# Patient Record
Sex: MALE | Race: WHITE | Employment: OTHER | URBAN - METROPOLITAN AREA
[De-identification: names, ages, dates, MRNs, and addresses within clinical notes are randomized per-mention and may not be internally consistent; named-entity substitution may affect disease eponyms.]

---

## 2021-02-10 ENCOUNTER — OFFICE VISIT (OUTPATIENT)
Dept: PRIMARY CARE CLINIC | Age: 86
End: 2021-02-10
Payer: MEDICARE

## 2021-02-10 DIAGNOSIS — Z01.812 PRE-OPERATIVE LABORATORY EXAMINATION: Primary | ICD-10-CM

## 2021-02-10 PROCEDURE — G8428 CUR MEDS NOT DOCUMENT: HCPCS | Performed by: NURSE PRACTITIONER

## 2021-02-10 PROCEDURE — G8421 BMI NOT CALCULATED: HCPCS | Performed by: NURSE PRACTITIONER

## 2021-02-10 PROCEDURE — 99211 OFF/OP EST MAY X REQ PHY/QHP: CPT | Performed by: NURSE PRACTITIONER

## 2021-02-10 RX ORDER — LISINOPRIL 10 MG/1
10 TABLET ORAL DAILY
COMMUNITY
Start: 2020-06-05

## 2021-02-10 RX ORDER — INDOMETHACIN 50 MG/1
50 CAPSULE ORAL PRN
COMMUNITY
End: 2022-02-01

## 2021-02-10 RX ORDER — ESOMEPRAZOLE MAGNESIUM 40 MG/1
40 CAPSULE, DELAYED RELEASE ORAL
COMMUNITY
Start: 2021-02-06 | End: 2022-02-09

## 2021-02-10 RX ORDER — ATORVASTATIN CALCIUM 10 MG/1
10 TABLET, FILM COATED ORAL DAILY
COMMUNITY
End: 2022-02-01

## 2021-02-10 RX ORDER — TAMSULOSIN HYDROCHLORIDE 0.4 MG/1
0.4 CAPSULE ORAL DAILY
COMMUNITY
End: 2021-03-11

## 2021-02-10 RX ORDER — ALFUZOSIN HYDROCHLORIDE 10 MG/1
10 TABLET, EXTENDED RELEASE ORAL DAILY
COMMUNITY
Start: 2020-12-28 | End: 2022-02-09

## 2021-02-10 NOTE — PROGRESS NOTES
The patient was counseled at length about the risks of nellie Covid-19 during their perioperative period and any recovery window from their procedure. The patient was made aware that nellie Covid-19  may worsen their prognosis for recovering from their procedure  and lend to a higher morbidity and/or mortality risk. All material risks, benefits, and reasonable alternatives including postponing the procedure were discussed. The patient does wish to proceed with the procedure at this time.

## 2021-02-10 NOTE — PROGRESS NOTES
C-Difficile admission screening and protocol:     * Admitted with diarrhea? n     *Prior history of C-Diff. In last 3 months?n     *Antibiotic use in the past 6-8 weeks?n     *Prior hospitalization or nursing home in the last month?n        [address] time___730____        Surgery time____________    Take the following medications with a sip of water:    Do not eat or drink anything after 12:00 midnight prior to your surgery. This includes water chewing gum, mints and ice chips. You may brush your teeth and gargle the morning of your surgery, but do not swallow the water     Please see your family doctor/pediatrician for a history and physical and/or concerning medications. Bring any test results/reports from your physicians office. If you are under the care of a heart doctor or specialist doctor, please be aware that you may be asked to them for clearance    You may be asked to stop blood thinners such as Coumadin, Plavix, Fragmin, Lovenox, etc., or any anti-inflammatories such as:  Aspirin, Ibuprofen, Advil, Naproxen prior to your surgery. We also ask that you stop any OTC medications such as fish oil, vitamin E, glucosamine, garlic, Multivitamins, COQ 10, etc.    We ask that you do not smoke 24 hours prior to surgery  We ask that you do not  drink any alcoholic beverages 24 hours prior to surgery     You must make arrangements for a responsible adult to take you home after your surgery. For your safety you will not be allowed to leave alone or drive yourself home. Your surgery will be cancelled if you do not have a ride home. Also for your safety, it is strongly suggested that someone stay with you the first 24 hours after your surgery. A parent or legal guardian must accompany a child scheduled for surgery and plan to stay at the hospital until the child is discharged. Please do not bring other children with you.     For your comfort, please wear simple loose fitting clothing to the hospital.  Please do not bring valuables. Do not wear any make-up or nail polish on your fingers or toes      For your safety, please do not wear any jewelry or body piercing's on the day of surgery. All jewelry must be removed. If you have dentures, they will be removed before going to operating room. For your convenience, we will provide you with a container. If you wear contact lenses or glasses, they will be removed, please bring a case for them. If you have a living will and a durable power of  for healthcare, please bring in a copy. As part of our patient safety program to minimize surgical site infections, we ask you to do the following:    · Please notify your surgeon if you develop any illness between         now and the  day of your surgery. · This includes a cough, cold, fever, sore throat, nausea,         or vomiting, and diarrhea, etc.  ·  Please notify your surgeon if you experience dizziness, shortness         of breath or blurred vision between now and the time of your surgery. Do not shave your operative site 96 hours prior to surgery. For face and neck surgery, men may use an electric razor 48 hours   prior to surgery. You may shower the night before surgery or the morning of   your surgery with an antibacterial soap. You will need to bring a photo ID and insurance card    [facility] has an onsite pharmacy, would you like to utilize our pharmacy     If you will be staying overnight and use a C-pap machine, please bring   your C-pap to hospital     Our goal is to provide you with excellent care, therefore, visitors will be limited to two(2) in the room at a time so that we may focus on providing this care for you. Please contact pre-admission testing if you have any further questions.                  [facility] phone number:  [phone] PAT fax number:  [phone]  Please note these are generalized instructions for all surgical cases, you may be provided with more specific instructions according to your surgery.

## 2021-02-11 LAB — SARS-COV-2, PCR: NOT DETECTED

## 2021-02-15 ENCOUNTER — ANESTHESIA EVENT (OUTPATIENT)
Dept: ENDOSCOPY | Age: 86
End: 2021-02-15
Payer: MEDICARE

## 2021-02-16 ENCOUNTER — ANESTHESIA (OUTPATIENT)
Dept: ENDOSCOPY | Age: 86
End: 2021-02-16
Payer: MEDICARE

## 2021-03-11 RX ORDER — LANOLIN ALCOHOL/MO/W.PET/CERES
325 CREAM (GRAM) TOPICAL
COMMUNITY
End: 2022-02-18

## 2021-03-11 RX ORDER — ACETAMINOPHEN 325 MG/1
650 TABLET ORAL EVERY 6 HOURS PRN
Status: ON HOLD | COMMUNITY
End: 2022-02-11 | Stop reason: HOSPADM

## 2021-03-11 NOTE — PROGRESS NOTES
C-Difficile admission screening and protocol:     * Admitted with diarrhea?no     *Prior history of C-Diff. In last 3 months?no     *Antibiotic use in the past 6-8 weeks?no     *Prior hospitalization or nursing home in the last month?  no    Preoperative Screening for Elective Surgery/Invasive Procedures While COVID-19 present in the community     Have you tested positive or have been told to self-isolate for COVID-19 like symptoms within the past 28 days? no   Do you currently have any of the following symptoms?no  o Fever >100.0 F or 99.9 F in immunocompromised patients? o New onset cough, shortness of breath or difficulty breathing?  o New onset sore throat, myalgia (muscle aches and pains), headache, loss of taste/smell or diarrhea?  Have you had a potential exposure to COVID-19 within the past 14 days by:no  o Close contact with a confirmed case? o Close contact with a healthcare worker,  or essential infrastructure worker (grocery store, [employer], gas station, public utilities or transportation)? o Do you reside in a congregate setting such as; skilled nursing facility, adult home, correctional facility, homeless shelter or other institutional setting?  o Have you had recent travel to a known COVID-19 hotspot? Indicate if the patient has a positive screen by answering yes to one or more of the above questions. Patients who test positive or screen positive prior to surgery or on the day of surgery should be evaluated in conjunction with the surgeon/proceduralist/anesthesiologist to determine the urgency of the procedure. Remember. Shy Roth Safety First! Call before you Fall Remember      [address]  time___0930_________        Surgery time__1100__________    Take the following medications with a sip of water: Follow your MD/Surgeons pre-procedure instructions regarding your medications    Do not eat or drink anything after 12:00 midnight except for your prep prior to your surgery. This includes water chewing gum, mints and ice chips. You may brush your teeth and gargle the morning of your surgery, but do not swallow the water     Please see your family doctor/pediatrician for a history and physical and/or concerning medications. Bring any test results/reports from your physicians office. If you are under the care of a heart doctor or specialist doctor, please be aware that you may be asked to them for clearance    You may be asked to stop blood thinners such as Coumadin, Plavix, Fragmin, Lovenox, etc., or any anti-inflammatories such as:  Aspirin, Ibuprofen, Advil, Naproxen prior to your surgery. We also ask that you stop any OTC medications such as fish oil, vitamin E, glucosamine, garlic, Multivitamins, COQ 10, etc.    We ask that you do not smoke 24 hours prior to surgery  We ask that you do not  drink any alcoholic beverages 24 hours prior to surgery     You must make arrangements for a responsible adult to take you home after your surgery. For your safety you will not be allowed to leave alone or drive yourself home. Your surgery will be cancelled if you do not have a ride home. Also for your safety, it is strongly suggested that someone stay with you the first 24 hours after your surgery. A parent or legal guardian must accompany a child scheduled for surgery and plan to stay at the hospital until the child is discharged. Please do not bring other children with you. For your comfort, please wear simple loose fitting clothing to the hospital.  Please do not bring valuables. Do not wear any make-up or nail polish on your fingers or toes      For your safety, please do not wear any jewelry or body piercing's on the day of surgery. All jewelry must be removed. If you have dentures, they will be removed before going to operating room. For your convenience, we will provide you with a container.     If you wear contact lenses or glasses, they will be removed, please bring a case for them. If you have a living will and a durable power of  for healthcare, please bring in a copy. As part of our patient safety program to minimize surgical site infections, we ask you to do the following:    · Please notify your surgeon if you develop any illness between         now and the  day of your surgery. · This includes a cough, cold, fever, sore throat, nausea,         or vomiting, and diarrhea, etc.  ·  Please notify your surgeon if you experience dizziness, shortness         of breath or blurred vision between now and the time of your surgery. Do not shave your operative site 96 hours prior to surgery. For face and neck surgery, men may use an electric razor 48 hours   prior to surgery. You may shower the night before surgery or the morning of   your surgery with an antibacterial soap. You will need to bring a photo ID and insurance card    [facility] has an onsite pharmacy, would you like to utilize our pharmacy     If you will be staying overnight and use a C-pap machine, please bring   your C-pap to hospital     Our goal is to provide you with excellent care, therefore, visitors will be limited to two(2) in the room at a time so that we may focus on providing this care for you. Please contact pre-admission testing if you have any further questions. [facility] phone number:  [phone] PAT fax number:  [phone]  Please note these are generalized instructions for all surgical cases, you may be provided with more specific instructions according to your surgery.

## 2021-03-16 ENCOUNTER — HOSPITAL ENCOUNTER (OUTPATIENT)
Age: 86
Setting detail: OUTPATIENT SURGERY
Discharge: HOME | End: 2021-03-16
Attending: INTERNAL MEDICINE | Admitting: INTERNAL MEDICINE
Payer: MEDICARE

## 2021-03-16 VITALS
TEMPERATURE: 98 F | RESPIRATION RATE: 18 BRPM | DIASTOLIC BLOOD PRESSURE: 80 MMHG | WEIGHT: 215 LBS | HEART RATE: 63 BPM | BODY MASS INDEX: 26.18 KG/M2 | SYSTOLIC BLOOD PRESSURE: 154 MMHG | OXYGEN SATURATION: 99 % | HEIGHT: 76 IN

## 2021-03-16 VITALS — SYSTOLIC BLOOD PRESSURE: 113 MMHG | DIASTOLIC BLOOD PRESSURE: 59 MMHG | OXYGEN SATURATION: 91 %

## 2021-03-16 LAB — SARS-COV-2, NAAT: NOT DETECTED

## 2021-03-16 PROCEDURE — 2580000003 HC RX 258: Performed by: ANESTHESIOLOGY

## 2021-03-16 PROCEDURE — 7100000010 HC PHASE II RECOVERY - FIRST 15 MIN: Performed by: INTERNAL MEDICINE

## 2021-03-16 PROCEDURE — 3700000000 HC ANESTHESIA ATTENDED CARE: Performed by: INTERNAL MEDICINE

## 2021-03-16 PROCEDURE — 7100000011 HC PHASE II RECOVERY - ADDTL 15 MIN: Performed by: INTERNAL MEDICINE

## 2021-03-16 PROCEDURE — 2709999900 HC NON-CHARGEABLE SUPPLY: Performed by: INTERNAL MEDICINE

## 2021-03-16 PROCEDURE — 7100000000 HC PACU RECOVERY - FIRST 15 MIN: Performed by: INTERNAL MEDICINE

## 2021-03-16 PROCEDURE — 3609027000 HC COLONOSCOPY: Performed by: INTERNAL MEDICINE

## 2021-03-16 PROCEDURE — 7100000001 HC PACU RECOVERY - ADDTL 15 MIN: Performed by: INTERNAL MEDICINE

## 2021-03-16 PROCEDURE — 87635 SARS-COV-2 COVID-19 AMP PRB: CPT

## 2021-03-16 PROCEDURE — 3700000001 HC ADD 15 MINUTES (ANESTHESIA): Performed by: INTERNAL MEDICINE

## 2021-03-16 PROCEDURE — 6360000002 HC RX W HCPCS: Performed by: NURSE ANESTHETIST, CERTIFIED REGISTERED

## 2021-03-16 PROCEDURE — 2500000003 HC RX 250 WO HCPCS: Performed by: NURSE ANESTHETIST, CERTIFIED REGISTERED

## 2021-03-16 RX ORDER — LIDOCAINE HYDROCHLORIDE 20 MG/ML
INJECTION, SOLUTION EPIDURAL; INFILTRATION; INTRACAUDAL; PERINEURAL PRN
Status: DISCONTINUED | OUTPATIENT
Start: 2021-03-16 | End: 2021-03-16 | Stop reason: SDUPTHER

## 2021-03-16 RX ORDER — SODIUM CHLORIDE 9 MG/ML
INJECTION, SOLUTION INTRAVENOUS CONTINUOUS
Status: DISCONTINUED | OUTPATIENT
Start: 2021-03-16 | End: 2021-03-16 | Stop reason: HOSPADM

## 2021-03-16 RX ORDER — PROPOFOL 10 MG/ML
INJECTION, EMULSION INTRAVENOUS CONTINUOUS PRN
Status: DISCONTINUED | OUTPATIENT
Start: 2021-03-16 | End: 2021-03-16 | Stop reason: SDUPTHER

## 2021-03-16 RX ORDER — SODIUM CHLORIDE 0.9 % (FLUSH) 0.9 %
10 SYRINGE (ML) INJECTION EVERY 12 HOURS SCHEDULED
Status: DISCONTINUED | OUTPATIENT
Start: 2021-03-16 | End: 2021-03-16 | Stop reason: HOSPADM

## 2021-03-16 RX ORDER — PROPOFOL 10 MG/ML
INJECTION, EMULSION INTRAVENOUS PRN
Status: DISCONTINUED | OUTPATIENT
Start: 2021-03-16 | End: 2021-03-16 | Stop reason: SDUPTHER

## 2021-03-16 RX ORDER — SODIUM CHLORIDE 0.9 % (FLUSH) 0.9 %
10 SYRINGE (ML) INJECTION PRN
Status: DISCONTINUED | OUTPATIENT
Start: 2021-03-16 | End: 2021-03-16 | Stop reason: HOSPADM

## 2021-03-16 RX ADMIN — LIDOCAINE HYDROCHLORIDE 60 MG: 20 INJECTION, SOLUTION EPIDURAL; INFILTRATION; INTRACAUDAL; PERINEURAL at 11:02

## 2021-03-16 RX ADMIN — PROPOFOL 200 MCG/KG/MIN: 10 INJECTION, EMULSION INTRAVENOUS at 11:02

## 2021-03-16 RX ADMIN — PROPOFOL 100 MG: 10 INJECTION, EMULSION INTRAVENOUS at 11:02

## 2021-03-16 RX ADMIN — SODIUM CHLORIDE: 9 INJECTION, SOLUTION INTRAVENOUS at 10:12

## 2021-03-16 ASSESSMENT — PULMONARY FUNCTION TESTS
PIF_VALUE: 1

## 2021-03-16 ASSESSMENT — LIFESTYLE VARIABLES: SMOKING_STATUS: 0

## 2021-03-16 NOTE — PROGRESS NOTES
Patient awake and alert. Resp easy unlabored on room air O2 with SaO2 98%. Abdomen rounded soft. VSS. IV patent. Moving all extremities to command. Patient stable to transfer to ACU for phase II.

## 2021-03-16 NOTE — OP NOTE
Colonoscopy Procedure Note      Patient: Watson Pickens  : 1935  Acct#:     Procedure: Colonoscopy with terminal ileal intubation    Date:  3/16/2021    Surgeon:  Amos Baig MD    Referring Physician:  Amos Baig MD    Previous Colonoscopy: YES    Preoperative Diagnosis:  81 y.o. male  who presents for colonoscopy due to colon cancer screening. Postoperative Diagnosis:    1. Scattered left-sided diverticulosis  2. Fair-to-poor preparation, with extensive lavage of approximately 1 L of liquid stool to achieve adequate mucosal views to identify polyps >6 mm in size. Consent:  The patient or their legal guardian has signed a consent, and is aware of the potential risks, benefits, alternatives, and potential complications of this procedure. These include, but are not limited to hemorrhage, bleeding, post procedural pain, perforation, phlebitis, aspiration, hypotension, hypoxia, cardiovascular events such as arryhthmia, and possibly death. Additionally, the possibility of missed colonic polyps and interval colon cancer was discussed in the consent. Anesthesia:  MAC    Procedure: An informed consent was obtained from the patient after explanation of indications, benefits, possible risks and complications of the procedure. The patient was then taken to the endoscopy suite, placed in the left lateral decubitus position, and the above IV anesthesia was administered. A digital rectal examination was performed and revealed negative without mass, lesions or tenderness. The Olympus video colonoscope was placed in the patient's rectum under digital direction and advanced to the cecum. The cecum was identified by characteristic anatomy and ballottment. The ileocecal valve was identified. Fair-to-poor preparation, with extensive lavage of approximately 1 L of liquid stool to achieve adequate mucosal views to identify polyps >6 mm in size.     The terminal ileum was normal.    The scope was then withdrawn back through the cecum, ascending, transverse, descending, sigmoid colon, and rectum. Careful circumferential examination of the mucosa in these areas demonstrated scattered left-sided diverticulosis otherwise, the examined colonic mucosa was unremarkable. The scope was then withdrawn into the rectum and retroflexed. The retroflexed view of the anal verge and rectum demonstrates small internal hemorrhoids. The scope was straightened, the colon was decompressed and the scope was withdrawn from the patient. The patient tolerated the procedure well and was taken to the PACU in good condition. Estimated blood loss: None    Impression:  See post-procedure diagnoses. Recommendations:   - Follow-up pathology results in 7 days, by calling the office at [phone].  - Resume regular medications. - Resume diet as tolerated. - No repeat colonoscopy recommended given patient's age.     IDRIS [address]  3/16/2021  [phone]

## 2021-03-16 NOTE — H&P
Pre-operative History and Physical    Patient: Kali Coelho  : 1935  Acct#:     Intended Procedure:  colonoscopy     HISTORY OF PRESENT ILLNESS:  The patient is a 81 y.o. male  who presents for colonoscopy due to colon cancer screening. Past Medical History:        Diagnosis Date    Arthritis     Cancer ([address]     skin-leg    Enlarged prostate     GERD (gastroesophageal reflux disease)     Gout     Hyperlipidemia     Hypertension     Neuropathy     Wears glasses     Wears partial dentures     Wears partial dentures     upper     Past Surgical History:        Procedure Laterality Date    BACK SURGERY      COLONOSCOPY      FRACTURE SURGERY Left     ankle    TONSILLECTOMY       Medications Prior to Admission:   No current facility-administered medications on file prior to encounter. Current Outpatient Medications on File Prior to Encounter   Medication Sig Dispense Refill    acetaminophen (TYLENOL) 325 MG tablet Take 650 mg by mouth every 6 hours as needed for Pain      ferrous sulfate (FE TABS 325) 325 (65 Fe) MG EC tablet Take 325 mg by mouth 3 times daily (with meals)      lisinopril (PRINIVIL;ZESTRIL) 10 MG tablet Take 10 mg by mouth as needed       alfuzosin (UROXATRAL) 10 MG extended release tablet TAKE 1 TABLET BY MOUTH EVERY DAY      indomethacin (INDOCIN) 50 MG capsule Take 50 mg by mouth as needed for Pain      esomeprazole (NEXIUM) 40 MG delayed release capsule as needed       atorvastatin (LIPITOR) 10 MG tablet Take 10 mg by mouth daily          Allergies:  Patient has no known allergies. Social History:   TOBACCO:   reports that he has never smoked. He has never used smokeless tobacco.  ETOH:   reports previous alcohol use. DRUGS:   reports no history of drug use.     PHYSICAL EXAM:      Vital Signs: /85   Pulse 82   Temp 97.2 °F (36.2 °C) (Temporal)   Resp 17   Ht 6' 4\" (1.93 m)   Wt 215 lb (97.5 kg)   SpO2 98%   BMI 26.17 kg/m²    Airway: No stridor or wheezing noted. Good air movement  Pulmonary: without wheezes. Clear to auscultation  Cardiac:regular rate and rhythm without loud murmurs  Abdomen:soft, nontender,  Bowel sounds present    Pre-Procedure Assessment / Plan:  1) Colonoscopy    ASA Grade:  ASA 2 - Patient with mild systemic disease with no functional limitations  Mallampati Classification:  Class II    Level of Sedation Plan:MAC    Post Procedure plan: Return to same level of care    I assessed the patient and find that the patient is in satisfactory condition to proceed with the planned procedure and sedation plan. I have explained the risk, benefits, and alternatives to the procedure; the patient understands and agrees to proceed.        Charlene Banuelos MD  3/16/2021

## 2021-03-16 NOTE — PROGRESS NOTES
Patienet admitted to PACU form OR. Patient asleep. Resp easy unlabored on 3LNC with SAO2 90%. Abdomen rounded soft. VSS. IV patent to right hand.

## 2021-03-16 NOTE — ANESTHESIA PRE PROCEDURE
[facility] Department of Anesthesiology  Pre-Anesthesia Evaluation/Consultation       Name:  Watson Pickens  : 1935  Age:  81 y. o. MRN:  [id number]  Date: 3/16/2021           Surgeon: Surgeon(s):  Rg Ceja MD    Procedure: Procedure(s):  COLONOSCOPY     No Known Allergies  There is no problem list on file for this patient. Past Medical History:   Diagnosis Date    Arthritis     Cancer ([address]     skin-leg    Enlarged prostate     GERD (gastroesophageal reflux disease)     Gout     Hyperlipidemia     Hypertension     Neuropathy     Wears glasses     Wears partial dentures     Wears partial dentures     upper     Past Surgical History:   Procedure Laterality Date    BACK SURGERY      COLONOSCOPY      FRACTURE SURGERY Left     ankle    TONSILLECTOMY       Social History     Tobacco Use    Smoking status: Never Smoker    Smokeless tobacco: Never Used   Substance Use Topics    Alcohol use: Not Currently    Drug use: Never     Medications  No current facility-administered medications on file prior to encounter.       Current Outpatient Medications on File Prior to Encounter   Medication Sig Dispense Refill    acetaminophen (TYLENOL) 325 MG tablet Take 650 mg by mouth every 6 hours as needed for Pain      ferrous sulfate (FE TABS 325) 325 (65 Fe) MG EC tablet Take 325 mg by mouth 3 times daily (with meals)      lisinopril (PRINIVIL;ZESTRIL) 10 MG tablet Take 10 mg by mouth as needed       alfuzosin (UROXATRAL) 10 MG extended release tablet TAKE 1 TABLET BY MOUTH EVERY DAY      indomethacin (INDOCIN) 50 MG capsule Take 50 mg by mouth as needed for Pain      esomeprazole (NEXIUM) 40 MG delayed release capsule as needed       atorvastatin (LIPITOR) 10 MG tablet Take 10 mg by mouth daily       Current Facility-Administered Medications   Medication Dose Route Frequency Provider Last Rate Last Admin    0.9 % sodium chloride infusion   Intravenous Continuous Vero Mott  mL/hr at 21 1012 New Bag at 21 1012    sodium chloride flush 0.9 % injection 10 mL  10 mL Intravenous 2 times per day Vero Mott MD        sodium chloride flush 0.9 % injection 10 mL  10 mL Intravenous PRN Vero Mott MD         Vital Signs (Current)   Vitals:    02/10/21 1523 21 1130 21   BP:   111/85   Pulse:   82   Resp:   17   Temp:   97.2 °F (36.2 °C)   TempSrc:   Temporal   SpO2:   98%   Weight: 220 lb (99.8 kg) 215 lb (97.5 kg) 215 lb (97.5 kg)   Height: 6' 4\" (1.93 m) 6' 4\" (1.93 m) 6' 4\" (1.93 m)                                          BP Readings from Last 3 Encounters:   21 111     Vital Signs Statistics (for past 48 hrs)     Temp  Av.2 °F (36.2 °C)  Min: 97.2 °F (36.2 °C)   Min taken time: 21  Max: 97.2 °F (36.2 °C)   Max taken time: 21  Pulse  Av  Min: 80   Min taken time: 21  Max: 80   Max taken time: 21  Resp  Av  Min: 16   Min taken time: 21  Max: 16   Max taken time: 21  BP  Min: 111/85   Min taken time: 21  Max: 111/85   Max taken time: 21  SpO2  Av %  Min: 98 %   Min taken time: 21  Max: 98 %   Max taken time: 21  BP Readings from Last 3 Encounters:   21 111/85       BMI  Body mass index is 26.17 kg/m². Estimated body mass index is 26.17 kg/m² as calculated from the following:    Height as of this encounter: 6' 4\" (1.93 m). Weight as of this encounter: 215 lb (97.5 kg). CBC No results found for: WBC, RBC, HGB, HCT, MCV, RDW, PLT  CMP  No results found for: NA, K, CL, CO2, BUN, CREATININE, GFRAA, AGRATIO, LABGLOM, GLUCOSE, PROT, CALCIUM, BILITOT, ALKPHOS, AST, ALT  BMP  No results found for: NA, K, CL, CO2, BUN, CREATININE, CALCIUM, GFRAA, LABGLOM, GLUCOSE  POCGlucose  No results for input(s): GLUCOSE in the last 72 hours.    Coags  No results found for: PROTIME, INR, APTT  HCG (If

## 2021-03-16 NOTE — ANESTHESIA POSTPROCEDURE EVALUATION
Department of Anesthesiology  Postprocedure Note    Patient: Nura Pineda  MRN: [id number]  YOB: 1935  Date of evaluation: 3/16/2021  Time:  12:29 PM     Procedure Summary     Date: 03/16/21 Room / Location: [address] [place] [address]    Anesthesia Start: 1059 Anesthesia Stop: 0869    Procedure: COLONOSCOPY (N/A ) Diagnosis:       Colon cancer screening      (COLON CANCER SCREENING)    Surgeons: Sandy Brambila MD Responsible Provider: Renaldo Bowser MD    Anesthesia Type: MAC ASA Status: 3          Anesthesia Type: MAC    Gale Phase I: Gale Score: 10    Gale Phase II:      Last vitals: Reviewed and per EMR flowsheets.        Anesthesia Post Evaluation    Patient location during evaluation: PACU  Patient participation: complete - patient participated  Level of consciousness: awake and alert  Pain score: 0  Airway patency: patent  Nausea & Vomiting: no nausea and no vomiting  Complications: no  Cardiovascular status: blood pressure returned to baseline  Respiratory status: acceptable  Hydration status: euvolemic

## 2021-07-26 ENCOUNTER — HOSPITAL ENCOUNTER (OUTPATIENT)
Dept: GENERAL RADIOLOGY | Age: 86
Discharge: HOME | End: 2021-07-26
Payer: MEDICARE

## 2021-07-26 ENCOUNTER — HOSPITAL ENCOUNTER (OUTPATIENT)
Age: 86
Discharge: HOME | End: 2021-07-26
Payer: MEDICARE

## 2021-07-26 DIAGNOSIS — M25.562 PAIN IN BOTH KNEES, UNSPECIFIED CHRONICITY: ICD-10-CM

## 2021-07-26 DIAGNOSIS — M25.561 PAIN IN BOTH KNEES, UNSPECIFIED CHRONICITY: ICD-10-CM

## 2021-07-26 PROCEDURE — 73560 X-RAY EXAM OF KNEE 1 OR 2: CPT

## 2022-02-01 ENCOUNTER — HOSPITAL ENCOUNTER (INPATIENT)
Age: 87
LOS: 6 days | Discharge: HOME | DRG: 309 | End: 2022-02-07
Attending: EMERGENCY MEDICINE | Admitting: INTERNAL MEDICINE
Payer: MEDICARE

## 2022-02-01 DIAGNOSIS — R00.0 TACHYCARDIA: Primary | ICD-10-CM

## 2022-02-01 LAB
A/G RATIO: 1.2 (ref 1.1–2.2)
ALBUMIN SERPL-MCNC: 4.1 G/DL (ref 3.4–5)
ALP BLD-CCNC: 122 U/L (ref 40–129)
ALT SERPL-CCNC: 9 U/L (ref 10–40)
ANION GAP SERPL CALCULATED.3IONS-SCNC: 14 MMOL/L (ref 3–16)
AST SERPL-CCNC: 14 U/L (ref 15–37)
BASOPHILS ABSOLUTE: 0 K/UL (ref 0–0.2)
BASOPHILS RELATIVE PERCENT: 1 %
BILIRUB SERPL-MCNC: <0.2 MG/DL (ref 0–1)
BUN BLDV-MCNC: 18 MG/DL (ref 7–20)
CALCIUM SERPL-MCNC: 9.2 MG/DL (ref 8.3–10.6)
CHLORIDE BLD-SCNC: 104 MMOL/L (ref 99–110)
CO2: 20 MMOL/L (ref 21–32)
CREAT SERPL-MCNC: 1.2 MG/DL (ref 0.8–1.3)
D DIMER: <200 NG/ML DDU (ref 0–229)
EOSINOPHILS ABSOLUTE: 0.1 K/UL (ref 0–0.6)
EOSINOPHILS RELATIVE PERCENT: 1.9 %
GFR AFRICAN AMERICAN: >60
GFR NON-AFRICAN AMERICAN: 57
GLUCOSE BLD-MCNC: 97 MG/DL (ref 70–99)
HCT VFR BLD CALC: 38.3 % (ref 40.5–52.5)
HEMOGLOBIN: 12.3 G/DL (ref 13.5–17.5)
LYMPHOCYTES ABSOLUTE: 1.4 K/UL (ref 1–5.1)
LYMPHOCYTES RELATIVE PERCENT: 27.4 %
MCH RBC QN AUTO: 27 PG (ref 26–34)
MCHC RBC AUTO-ENTMCNC: 32.2 G/DL (ref 31–36)
MCV RBC AUTO: 83.9 FL (ref 80–100)
MONOCYTES ABSOLUTE: 0.5 K/UL (ref 0–1.3)
MONOCYTES RELATIVE PERCENT: 10.9 %
NEUTROPHILS ABSOLUTE: 2.9 K/UL (ref 1.7–7.7)
NEUTROPHILS RELATIVE PERCENT: 58.8 %
PDW BLD-RTO: 15.6 % (ref 12.4–15.4)
PLATELET # BLD: 290 K/UL (ref 135–450)
PMV BLD AUTO: 6.6 FL (ref 5–10.5)
POTASSIUM REFLEX MAGNESIUM: 4.7 MMOL/L (ref 3.5–5.1)
RBC # BLD: 4.56 M/UL (ref 4.2–5.9)
SODIUM BLD-SCNC: 138 MMOL/L (ref 136–145)
TOTAL PROTEIN: 7.6 G/DL (ref 6.4–8.2)
TROPONIN: <0.01 NG/ML
TSH REFLEX: 3.75 UIU/ML (ref 0.27–4.2)
WBC # BLD: 5 K/UL (ref 4–11)

## 2022-02-01 PROCEDURE — 93005 ELECTROCARDIOGRAM TRACING: CPT | Performed by: EMERGENCY MEDICINE

## 2022-02-01 PROCEDURE — 85379 FIBRIN DEGRADATION QUANT: CPT

## 2022-02-01 PROCEDURE — 6360000002 HC RX W HCPCS: Performed by: EMERGENCY MEDICINE

## 2022-02-01 PROCEDURE — 99283 EMERGENCY DEPT VISIT LOW MDM: CPT

## 2022-02-01 PROCEDURE — 80053 COMPREHEN METABOLIC PANEL: CPT

## 2022-02-01 PROCEDURE — 2060000000 HC ICU INTERMEDIATE R&B

## 2022-02-01 PROCEDURE — 84484 ASSAY OF TROPONIN QUANT: CPT

## 2022-02-01 PROCEDURE — 2500000003 HC RX 250 WO HCPCS: Performed by: INTERNAL MEDICINE

## 2022-02-01 PROCEDURE — 96361 HYDRATE IV INFUSION ADD-ON: CPT

## 2022-02-01 PROCEDURE — 2580000003 HC RX 258: Performed by: INTERNAL MEDICINE

## 2022-02-01 PROCEDURE — 96374 THER/PROPH/DIAG INJ IV PUSH: CPT

## 2022-02-01 PROCEDURE — 2580000003 HC RX 258: Performed by: NURSE PRACTITIONER

## 2022-02-01 PROCEDURE — 85025 COMPLETE CBC W/AUTO DIFF WBC: CPT

## 2022-02-01 PROCEDURE — 6360000002 HC RX W HCPCS: Performed by: INTERNAL MEDICINE

## 2022-02-01 PROCEDURE — 84443 ASSAY THYROID STIM HORMONE: CPT

## 2022-02-01 PROCEDURE — 2500000003 HC RX 250 WO HCPCS: Performed by: NURSE PRACTITIONER

## 2022-02-01 PROCEDURE — 93005 ELECTROCARDIOGRAM TRACING: CPT

## 2022-02-01 PROCEDURE — 36415 COLL VENOUS BLD VENIPUNCTURE: CPT

## 2022-02-01 PROCEDURE — 96375 TX/PRO/DX INJ NEW DRUG ADDON: CPT

## 2022-02-01 PROCEDURE — 2500000003 HC RX 250 WO HCPCS: Performed by: EMERGENCY MEDICINE

## 2022-02-01 PROCEDURE — 6360000002 HC RX W HCPCS

## 2022-02-01 PROCEDURE — 2580000003 HC RX 258: Performed by: EMERGENCY MEDICINE

## 2022-02-01 PROCEDURE — 99223 1ST HOSP IP/OBS HIGH 75: CPT | Performed by: INTERNAL MEDICINE

## 2022-02-01 RX ORDER — ACETAMINOPHEN 325 MG/1
650 TABLET ORAL EVERY 6 HOURS PRN
Status: DISCONTINUED | OUTPATIENT
Start: 2022-02-01 | End: 2022-02-07 | Stop reason: HOSPADM

## 2022-02-01 RX ORDER — MAGNESIUM SULFATE IN WATER 40 MG/ML
2000 INJECTION, SOLUTION INTRAVENOUS PRN
Status: DISCONTINUED | OUTPATIENT
Start: 2022-02-01 | End: 2022-02-07 | Stop reason: HOSPADM

## 2022-02-01 RX ORDER — 0.9 % SODIUM CHLORIDE 0.9 %
1000 INTRAVENOUS SOLUTION INTRAVENOUS ONCE
Status: COMPLETED | OUTPATIENT
Start: 2022-02-01 | End: 2022-02-01

## 2022-02-01 RX ORDER — FERROUS SULFATE TAB EC 324 MG (65 MG FE EQUIVALENT) 324 (65 FE) MG
324 TABLET DELAYED RESPONSE ORAL
Status: DISCONTINUED | OUTPATIENT
Start: 2022-02-02 | End: 2022-02-07 | Stop reason: HOSPADM

## 2022-02-01 RX ORDER — OMEPRAZOLE 20 MG/1
40 CAPSULE, DELAYED RELEASE ORAL DAILY
COMMUNITY
End: 2022-02-01

## 2022-02-01 RX ORDER — ACETAMINOPHEN 650 MG/1
650 SUPPOSITORY RECTAL EVERY 6 HOURS PRN
Status: DISCONTINUED | OUTPATIENT
Start: 2022-02-01 | End: 2022-02-07 | Stop reason: HOSPADM

## 2022-02-01 RX ORDER — ONDANSETRON 2 MG/ML
4 INJECTION INTRAMUSCULAR; INTRAVENOUS EVERY 6 HOURS PRN
Status: DISCONTINUED | OUTPATIENT
Start: 2022-02-01 | End: 2022-02-07 | Stop reason: HOSPADM

## 2022-02-01 RX ORDER — ADENOSINE 3 MG/ML
6 INJECTION, SOLUTION INTRAVENOUS ONCE
Status: COMPLETED | OUTPATIENT
Start: 2022-02-01 | End: 2022-02-01

## 2022-02-01 RX ORDER — ADENOSINE 3 MG/ML
INJECTION, SOLUTION INTRAVENOUS
Status: COMPLETED
Start: 2022-02-01 | End: 2022-02-01

## 2022-02-01 RX ORDER — POTASSIUM CHLORIDE 7.45 MG/ML
10 INJECTION INTRAVENOUS PRN
Status: DISCONTINUED | OUTPATIENT
Start: 2022-02-01 | End: 2022-02-07 | Stop reason: HOSPADM

## 2022-02-01 RX ORDER — PANTOPRAZOLE SODIUM 40 MG/1
40 TABLET, DELAYED RELEASE ORAL
Status: DISCONTINUED | OUTPATIENT
Start: 2022-02-02 | End: 2022-02-07 | Stop reason: HOSPADM

## 2022-02-01 RX ORDER — PROMETHAZINE HYDROCHLORIDE 25 MG/1
12.5 TABLET ORAL EVERY 6 HOURS PRN
Status: DISCONTINUED | OUTPATIENT
Start: 2022-02-01 | End: 2022-02-07 | Stop reason: HOSPADM

## 2022-02-01 RX ORDER — SODIUM CHLORIDE 9 MG/ML
25 INJECTION, SOLUTION INTRAVENOUS PRN
Status: DISCONTINUED | OUTPATIENT
Start: 2022-02-01 | End: 2022-02-07 | Stop reason: HOSPADM

## 2022-02-01 RX ORDER — SODIUM CHLORIDE 0.9 % (FLUSH) 0.9 %
10 SYRINGE (ML) INJECTION EVERY 12 HOURS SCHEDULED
Status: DISCONTINUED | OUTPATIENT
Start: 2022-02-01 | End: 2022-02-07 | Stop reason: HOSPADM

## 2022-02-01 RX ORDER — POTASSIUM CHLORIDE 20 MEQ/1
40 TABLET, EXTENDED RELEASE ORAL PRN
Status: DISCONTINUED | OUTPATIENT
Start: 2022-02-01 | End: 2022-02-07 | Stop reason: HOSPADM

## 2022-02-01 RX ORDER — DILTIAZEM HYDROCHLORIDE 5 MG/ML
0.25 INJECTION INTRAVENOUS ONCE
Status: COMPLETED | OUTPATIENT
Start: 2022-02-01 | End: 2022-02-01

## 2022-02-01 RX ORDER — LISINOPRIL 10 MG/1
10 TABLET ORAL DAILY
Status: DISCONTINUED | OUTPATIENT
Start: 2022-02-02 | End: 2022-02-07 | Stop reason: HOSPADM

## 2022-02-01 RX ORDER — SODIUM CHLORIDE 0.9 % (FLUSH) 0.9 %
10 SYRINGE (ML) INJECTION PRN
Status: DISCONTINUED | OUTPATIENT
Start: 2022-02-01 | End: 2022-02-07 | Stop reason: HOSPADM

## 2022-02-01 RX ORDER — ADENOSINE 3 MG/ML
12 INJECTION, SOLUTION INTRAVENOUS ONCE
Status: COMPLETED | OUTPATIENT
Start: 2022-02-01 | End: 2022-02-01

## 2022-02-01 RX ORDER — DILTIAZEM HYDROCHLORIDE 5 MG/ML
5 INJECTION INTRAVENOUS EVERY 8 HOURS PRN
Status: DISCONTINUED | OUTPATIENT
Start: 2022-02-01 | End: 2022-02-07 | Stop reason: HOSPADM

## 2022-02-01 RX ORDER — DILTIAZEM HYDROCHLORIDE 5 MG/ML
5 INJECTION INTRAVENOUS ONCE
Status: COMPLETED | OUTPATIENT
Start: 2022-02-01 | End: 2022-02-01

## 2022-02-01 RX ADMIN — SODIUM CHLORIDE 1000 ML: 9 INJECTION, SOLUTION INTRAVENOUS at 14:33

## 2022-02-01 RX ADMIN — ADENOSINE 6 MG: 3 INJECTION, SOLUTION INTRAVENOUS at 14:49

## 2022-02-01 RX ADMIN — DILTIAZEM HYDROCHLORIDE 5 MG: 5 INJECTION INTRAVENOUS at 18:43

## 2022-02-01 RX ADMIN — SODIUM CHLORIDE, PRESERVATIVE FREE 10 ML: 5 INJECTION INTRAVENOUS at 22:05

## 2022-02-01 RX ADMIN — DILTIAZEM HYDROCHLORIDE 5 MG/HR: 5 INJECTION INTRAVENOUS at 21:07

## 2022-02-01 RX ADMIN — ADENOSINE 12 MG: 3 INJECTION, SOLUTION INTRAVENOUS at 14:52

## 2022-02-01 RX ADMIN — DILTIAZEM HYDROCHLORIDE 5 MG: 5 INJECTION INTRAVENOUS at 20:56

## 2022-02-01 RX ADMIN — DILTIAZEM HYDROCHLORIDE 25 MG: 5 INJECTION INTRAVENOUS at 16:03

## 2022-02-01 RX ADMIN — ENOXAPARIN SODIUM 40 MG: 40 INJECTION SUBCUTANEOUS at 22:19

## 2022-02-01 ASSESSMENT — PAIN SCALES - GENERAL: PAINLEVEL_OUTOF10: 0

## 2022-02-01 NOTE — ED PROVIDER NOTES
[address] COMPLAINT  Tachycardia (states he went to donate blood and had palpitations, states PCP sent him for eval. Pt denies CP or SOB)        HISTORY OF PRESENT ILLNESS  Jill Li is a 81 y.o. male who presents to the ED with complaint of tachycardia. Patient was seen by his PCP earlier today. At that time he had stated that he had presented to [facility] to donate blood. He was told that his pulse was in the upper 130s. He denies palpitations, chest pain, shortness of breath, lightheadedness, nausea, vomiting, diarrhea, or any other concerns. No other complaints, modifying factors or associated symptoms.      I have reviewed the following from the nursing documentation:    Past Medical History:   Diagnosis Date    Arthritis     Cancer ([address]     skin-leg    Enlarged prostate     GERD (gastroesophageal reflux disease)     Gout     Hyperlipidemia     Hypertension     Neuropathy     Wears glasses     Wears partial dentures     Wears partial dentures     upper     Past Surgical History:   Procedure Laterality Date    BACK SURGERY      COLONOSCOPY      COLONOSCOPY N/A 3/16/2021    COLONOSCOPY performed by Richie Dhillon MD at [address] Left     ankle    TONSILLECTOMY       Family History   Problem Relation Age of Onset    Cancer Mother     Cancer Father     Cancer Sister      Social History     Socioeconomic History    Marital status: Unknown     Spouse name: Not on file    Number of children: Not on file    Years of education: Not on file    Highest education level: Not on file   Occupational History    Not on file   Tobacco Use    Smoking status: Never Smoker    Smokeless tobacco: Never Used   Vaping Use    Vaping Use: Never used   Substance and Sexual Activity    Alcohol use: Not Currently    Drug use: Never    Sexual activity: Not on file   Other Topics Concern    Not on file   Social History Narrative    Not on file     Social Determinants of Health     Financial Resource Strain:     Difficulty of Paying Living Expenses: Not on file   Food Insecurity:     Worried About Running Out of Food in the Last Year: Not on file    Miesha of Food in the Last Year: Not on file   Transportation Needs:     Lack of Transportation (Medical): Not on file    Lack of Transportation (Non-Medical):  Not on file   Physical Activity:     Days of Exercise per Week: Not on file    Minutes of Exercise per Session: Not on file   Stress:     Feeling of Stress : Not on file   Social Connections:     Frequency of Communication with Friends and Family: Not on file    Frequency of Social Gatherings with Friends and Family: Not on file    Attends Religion Services: Not on file    Active Member of [address] [place] [address] [employer] or Organizations: Not on file    Attends Club or Organization Meetings: Not on file    Marital Status: Not on file   Intimate Partner Violence:     Fear of Current or Ex-Partner: Not on file    Emotionally Abused: Not on file    Physically Abused: Not on file    Sexually Abused: Not on file   Housing Stability:     Unable to Pay for Housing in the Last Year: Not on file    Number of [place] in the Last Year: Not on file    Unstable Housing in the Last Year: Not on file     Current Facility-Administered Medications   Medication Dose Route Frequency Provider Last Rate Last Admin    sodium chloride flush 0.9 % injection 10 mL  10 mL IntraVENous 2 times per day Archie Carroll MD        sodium chloride flush 0.9 % injection 10 mL  10 mL IntraVENous PRN Archie Carroll MD        0.9 % sodium chloride infusion  25 mL IntraVENous PRN Archie Carroll MD        potassium chloride (KLOR-CON M) extended release tablet 40 mEq  40 mEq Oral PRN Archie Carroll MD        Or    potassium bicarb-citric acid (EFFER-K) effervescent tablet 40 mEq  40 mEq Oral PRN Archie Carroll MD        Or    potassium chloride 10 mEq/100 mL IVPB (Peripheral Line)  10 mEq IntraVENous PRN Louise Hernadez MD        potassium chloride 10 mEq/100 mL IVPB (Peripheral Line)  10 mEq IntraVENous PRN Louise Hernadez MD        magnesium sulfate 2000 mg in 50 mL IVPB premix  2,000 mg IntraVENous PRN Louise Hernadez MD        enoxaparin (LOVENOX) injection 40 mg  40 mg SubCUTAneous Nightly Louise Hernadez MD        promethazine (PHENERGAN) tablet 12.5 mg  12.5 mg Oral Q6H PRN Louise Hernadez MD        Or    ondansetron (ZOFRAN) injection 4 mg  4 mg IntraVENous Q6H PRN Louise Hernadez MD        magnesium hydroxide (MILK OF MAGNESIA) 400 MG/5ML suspension 30 mL  30 mL Oral Daily PRN Louise Hernadez MD        acetaminophen (TYLENOL) tablet 650 mg  650 mg Oral Q6H PRN Louise Hernadez MD        Or    acetaminophen (TYLENOL) suppository 650 mg  650 mg Rectal Q6H PRIRINA Hernadez MD        dilTIAZem injection 5 mg  5 mg IntraVENous Q8H PRIRINA Hernadez MD         Current Outpatient Medications   Medication Sig Dispense Refill    acetaminophen (TYLENOL) 325 MG tablet Take 650 mg by mouth every 6 hours as needed for Pain      ferrous sulfate (FE TABS 325) 325 (65 Fe) MG EC tablet Take 325 mg by mouth 3 times daily (with meals)      lisinopril (PRINIVIL;ZESTRIL) 10 MG tablet Take 10 mg by mouth daily       alfuzosin (UROXATRAL) 10 MG extended release tablet Take 10 mg by mouth daily       esomeprazole (NEXIUM) 40 MG delayed release capsule Take 40 mg by mouth every morning (before breakfast)        No Known Allergies    REVIEW OF SYSTEMS  10 systems reviewed, pertinent positives and negatives per HPI, otherwise noted to be negative. PHYSICAL EXAM  ED Triage Vitals [02/01/22 1340]   BP Temp Temp Source Pulse Resp SpO2 Height Weight   (!) 137/96 97.2 °F (36.2 °C) Oral 140 16 100 % -- 220 lb (99.8 kg)     General appearance: Awake and alert. Cooperative. No acute distress. HENT: Normocephalic. Atraumatic. Mucous membranes are moist.  Neck: Supple. Eyes: PERRL. EOMI.   Heart/Chest: Tachy rate, regular rhythm. No murmurs. Lungs: Respirations unlabored. CTAB. Good air exchange. Speaking comfortably in full sentences. Abdomen: Soft. Non-tender. Non-distended. No rebound or guarding. Musculoskeletal: No extremity edema. No deformity. No tenderness in the extremities. All extremities neurovascularly intact. Skin: Warm and dry. No acute rashes. Neurological: Alert and oriented. CN II-XII intact. Strength 5/5 bilateral upper and lower extremities. Sensation intact to light touch. Gait normal.  Psychiatric: Mood/affect: normal      LABS  I have reviewed all labs for this visit.    Results for orders placed or performed during the hospital encounter of 02/01/22   CBC Auto Differential   Result Value Ref Range    WBC 5.0 4.0 - 11.0 K/uL    RBC 4.56 4.20 - 5.90 M/uL    Hemoglobin 12.3 (L) 13.5 - 17.5 g/dL    Hematocrit 38.3 (L) 40.5 - 52.5 %    MCV 83.9 80.0 - 100.0 fL    MCH 27.0 26.0 - 34.0 pg    MCHC 32.2 31.0 - 36.0 g/dL    RDW 15.6 (H) 12.4 - 15.4 %    Platelets 114 144 - 159 K/uL    MPV 6.6 5.0 - 10.5 fL    Neutrophils % 58.8 %    Lymphocytes % 27.4 %    Monocytes % 10.9 %    Eosinophils % 1.9 %    Basophils % 1.0 %    Neutrophils Absolute 2.9 1.7 - 7.7 K/uL    Lymphocytes Absolute 1.4 1.0 - 5.1 K/uL    Monocytes Absolute 0.5 0.0 - 1.3 K/uL    Eosinophils Absolute 0.1 0.0 - 0.6 K/uL    Basophils Absolute 0.0 0.0 - 0.2 K/uL   Comprehensive Metabolic Panel w/ Reflex to MG   Result Value Ref Range    Sodium 138 136 - 145 mmol/L    Potassium reflex Magnesium 4.7 3.5 - 5.1 mmol/L    Chloride 104 99 - 110 mmol/L    CO2 20 (L) 21 - 32 mmol/L    Anion Gap 14 3 - 16    Glucose 97 70 - 99 mg/dL    BUN 18 7 - 20 mg/dL    CREATININE 1.2 0.8 - 1.3 mg/dL    GFR Non- 57 (A) >60    GFR African American >60 >60    Calcium 9.2 8.3 - 10.6 mg/dL    Total Protein 7.6 6.4 - 8.2 g/dL    Albumin 4.1 3.4 - 5.0 g/dL    Albumin/Globulin Ratio 1.2 1.1 - 2.2    Total Bilirubin <0.2 0.0 - 1.0 mg/dL Alkaline Phosphatase 122 40 - 129 U/L    ALT 9 (L) 10 - 40 U/L    AST 14 (L) 15 - 37 U/L   D-Dimer, Quantitative   Result Value Ref Range    D-Dimer, Quant <200 0 - 229 ng/mL DDU   Troponin   Result Value Ref Range    Troponin <0.01 <0.01 ng/mL   TSH with Reflex   Result Value Ref Range    TSH 3.75 0.27 - 4.20 uIU/mL       RADIOLOGY  I have reviewed all radiographic studies for this visit. No orders to display        ECG  EKG interpreted by myself. Rate: 140  Rhythm: SVT  Axis: normal  Intervals: QRS 82   QTc 500  ST Segments: no acute abnormality  T waves: nonspecific T wave abnormality  Comparison: no prior   Impression: SVT w/ nonspecific T wave abnormality       ED COURSE/MDM  Patient seen and evaluated. Old records reviewed. Labs and imaging reviewed and results discussed with patient/family to extent possible. This is a an 50-year-old male presents with complaint of tachycardia. Found to be in SVT. On arrival the patient is tachycardic and slightly hypertensive with otherwise reassuring vital signs. The patient is entirely asymptomatic. Vagal maneuver augmented with leg raise was trialed, without success. Patient was administered 6 mg of adenosine without any result. He was administered 12 mg of adenosine with positive affect however tachycardia did not resolve. That said, on rhythm tracing, it appears the patient may have the possibility of atrial fibrillation or atrial flutter. I did discuss the patient's case with electrophysiology who evaluated the patient at bedside. 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 They recommend diltiazem bolus plus minus drip. They recommend deferring anticoagulation. Patient was administered a 0.25 mg/kg diltiazem bolus with improvement in his heart rate. Heart rate remains labile but is in the 90s and sometimes low 100s. As such, will defer drip.   In the absence of chest pain, I do not believe the patient's presentation represents pulmonary embolism. TSH is within normal limits. CBC no leukocytosis. Mild anemia hemoglobin 12.3, noncontributory. Renal panel with no significant electrolyte abnormality. Renal function is preserved. Discussed patient's case with hospitalist and will admit to hospital medicine for further evaluation and treatment.      During the patient's ED course, the patient was given:  Medications   sodium chloride flush 0.9 % injection 10 mL (has no administration in time range)   sodium chloride flush 0.9 % injection 10 mL (has no administration in time range)   0.9 % sodium chloride infusion (has no administration in time range)   potassium chloride (KLOR-CON M) extended release tablet 40 mEq (has no administration in time range)     Or   potassium bicarb-citric acid (EFFER-K) effervescent tablet 40 mEq (has no administration in time range)     Or   potassium chloride 10 mEq/100 mL IVPB (Peripheral Line) (has no administration in time range)   potassium chloride 10 mEq/100 mL IVPB (Peripheral Line) (has no administration in time range)   magnesium sulfate 2000 mg in 50 mL IVPB premix (has no administration in time range)   enoxaparin (LOVENOX) injection 40 mg (has no administration in time range)   promethazine (PHENERGAN) tablet 12.5 mg (has no administration in time range)     Or   ondansetron (ZOFRAN) injection 4 mg (has no administration in time range)   magnesium hydroxide (MILK OF MAGNESIA) 400 MG/5ML suspension 30 mL (has no administration in time range)   acetaminophen (TYLENOL) tablet 650 mg (has no administration in time range)     Or   acetaminophen (TYLENOL) suppository 650 mg (has no administration in time range)   dilTIAZem injection 5 mg (has no administration in time range)   0.9 % sodium chloride bolus (0 mLs IntraVENous Stopped 2/1/22 1600)   adenosine (ADENOCARD) injection 6 mg (6 mg IntraVENous Given 2/1/22 1449)   dilTIAZem injection 25 mg (25 mg IntraVENous Given 2/1/22 1603)   adenosine (ADENOCARD) injection 12 mg (12 mg IntraVENous Given 2/1/22 9476)        All questions were answered and the patient/family expressed understanding and agreement with the plan. PROCEDURES  Chemical cardioversion, as above    CRITICAL CARE  Total critical care time provided today thus far was 32 minutes. This excludes seperately billable procedures. Critical care time was provided for tachycardia requiring diltiazem, consideration for anticoagulation, and that required close evaluation and/or intervention with concern for potential patient decompensation. CLINICAL IMPRESSION  1. Tachycardia        DISPOSITION   admit    Ale Warner MD    Note: This chart was created using voice recognition dictation software. Efforts were made by me to ensure accuracy, however some errors may be present due to limitations of this technology and occasionally words are not transcribed correctly.         Ale Warner MD  02/01/22 6213

## 2022-02-02 LAB
ANION GAP SERPL CALCULATED.3IONS-SCNC: 14 MMOL/L (ref 3–16)
BUN BLDV-MCNC: 19 MG/DL (ref 7–20)
CALCIUM SERPL-MCNC: 8.9 MG/DL (ref 8.3–10.6)
CHLORIDE BLD-SCNC: 104 MMOL/L (ref 99–110)
CO2: 18 MMOL/L (ref 21–32)
CREAT SERPL-MCNC: 1.1 MG/DL (ref 0.8–1.3)
EKG ATRIAL RATE: 141 BPM
EKG DIAGNOSIS: NORMAL
EKG Q-T INTERVAL: 328 MS
EKG QRS DURATION: 82 MS
EKG QTC CALCULATION (BAZETT): 500 MS
EKG R AXIS: -8 DEGREES
EKG T AXIS: 54 DEGREES
EKG VENTRICULAR RATE: 140 BPM
GFR AFRICAN AMERICAN: >60
GFR NON-AFRICAN AMERICAN: >60
GLUCOSE BLD-MCNC: 104 MG/DL (ref 70–99)
HCT VFR BLD CALC: 34.7 % (ref 40.5–52.5)
HEMOGLOBIN: 11.5 G/DL (ref 13.5–17.5)
LV EF: 23 %
LVEF MODALITY: NORMAL
MCH RBC QN AUTO: 27.7 PG (ref 26–34)
MCHC RBC AUTO-ENTMCNC: 33.2 G/DL (ref 31–36)
MCV RBC AUTO: 83.5 FL (ref 80–100)
PDW BLD-RTO: 15.7 % (ref 12.4–15.4)
PLATELET # BLD: 230 K/UL (ref 135–450)
PMV BLD AUTO: 6.9 FL (ref 5–10.5)
POTASSIUM REFLEX MAGNESIUM: 4.2 MMOL/L (ref 3.5–5.1)
RBC # BLD: 4.16 M/UL (ref 4.2–5.9)
SODIUM BLD-SCNC: 136 MMOL/L (ref 136–145)
WBC # BLD: 4.9 K/UL (ref 4–11)

## 2022-02-02 PROCEDURE — 36415 COLL VENOUS BLD VENIPUNCTURE: CPT

## 2022-02-02 PROCEDURE — 6370000000 HC RX 637 (ALT 250 FOR IP): Performed by: INTERNAL MEDICINE

## 2022-02-02 PROCEDURE — 97535 SELF CARE MNGMENT TRAINING: CPT

## 2022-02-02 PROCEDURE — 93010 ELECTROCARDIOGRAM REPORT: CPT | Performed by: INTERNAL MEDICINE

## 2022-02-02 PROCEDURE — 80048 BASIC METABOLIC PNL TOTAL CA: CPT

## 2022-02-02 PROCEDURE — 6370000000 HC RX 637 (ALT 250 FOR IP): Performed by: NURSE PRACTITIONER

## 2022-02-02 PROCEDURE — 93306 TTE W/DOPPLER COMPLETE: CPT

## 2022-02-02 PROCEDURE — 97165 OT EVAL LOW COMPLEX 30 MIN: CPT

## 2022-02-02 PROCEDURE — 2580000003 HC RX 258: Performed by: NURSE PRACTITIONER

## 2022-02-02 PROCEDURE — 2060000000 HC ICU INTERMEDIATE R&B

## 2022-02-02 PROCEDURE — 99233 SBSQ HOSP IP/OBS HIGH 50: CPT | Performed by: NURSE PRACTITIONER

## 2022-02-02 PROCEDURE — 2500000003 HC RX 250 WO HCPCS: Performed by: NURSE PRACTITIONER

## 2022-02-02 PROCEDURE — 85027 COMPLETE CBC AUTOMATED: CPT

## 2022-02-02 PROCEDURE — 2580000003 HC RX 258: Performed by: INTERNAL MEDICINE

## 2022-02-02 PROCEDURE — 9990000010 HC NO CHARGE VISIT

## 2022-02-02 PROCEDURE — 94760 N-INVAS EAR/PLS OXIMETRY 1: CPT

## 2022-02-02 RX ADMIN — METOPROLOL TARTRATE 25 MG: 25 TABLET, FILM COATED ORAL at 09:35

## 2022-02-02 RX ADMIN — FERROUS SULFATE TAB EC 324 MG (65 MG FE EQUIVALENT) 324 MG: 324 (65 FE) TABLET DELAYED RESPONSE at 09:35

## 2022-02-02 RX ADMIN — APIXABAN 5 MG: 5 TABLET, FILM COATED ORAL at 21:35

## 2022-02-02 RX ADMIN — METOPROLOL TARTRATE 25 MG: 25 TABLET, FILM COATED ORAL at 12:49

## 2022-02-02 RX ADMIN — PANTOPRAZOLE SODIUM 40 MG: 40 TABLET, DELAYED RELEASE ORAL at 05:06

## 2022-02-02 RX ADMIN — FERROUS SULFATE TAB EC 324 MG (65 MG FE EQUIVALENT) 324 MG: 324 (65 FE) TABLET DELAYED RESPONSE at 12:49

## 2022-02-02 RX ADMIN — METOPROLOL TARTRATE 25 MG: 25 TABLET, FILM COATED ORAL at 21:35

## 2022-02-02 RX ADMIN — SODIUM CHLORIDE, PRESERVATIVE FREE 10 ML: 5 INJECTION INTRAVENOUS at 09:34

## 2022-02-02 RX ADMIN — APIXABAN 5 MG: 5 TABLET, FILM COATED ORAL at 09:35

## 2022-02-02 RX ADMIN — DILTIAZEM HYDROCHLORIDE 5 MG/HR: 5 INJECTION INTRAVENOUS at 09:10

## 2022-02-02 RX ADMIN — FERROUS SULFATE TAB EC 324 MG (65 MG FE EQUIVALENT) 324 MG: 324 (65 FE) TABLET DELAYED RESPONSE at 16:45

## 2022-02-02 ASSESSMENT — PAIN SCALES - GENERAL
PAINLEVEL_OUTOF10: 0

## 2022-02-02 NOTE — PROGRESS NOTES
Pt arrived to floor via stretcher from ED and ambulated to bed. Telemetry activated. Patient oriented to room and use of call light. Call light and personal items within reach. Admission and assessment initiated. POC and education initiated and reviewed with patient. Telemetry box 25. Denied further needs or questions at this time. Will continue to monitor.

## 2022-02-02 NOTE — H&P
Hospital Medicine History & Physical      PCP: Melia Cabrera MD    Date of Admission: 2/1/2022    Chief Complaint: Increased heart rate    History Of Present Illness:    Patient is a 76-year-old male with past medical history of hypertension hyperlipidemia who presents to the hospital due to palpitations, patient mentions he was at his physician's office to donate blood. Patient mentions his heart rate was checked and it was in the 130s so he was sent to the hospital for further evaluation. Patient denies chest pain nausea vomiting diarrhea constipation dysuria at time of my evaluation. Past Medical History:          Diagnosis Date    Arthritis     Cancer ([address]     skin-leg    Enlarged prostate     GERD (gastroesophageal reflux disease)     Gout     Hyperlipidemia     Hypertension     Neuropathy     Wears glasses     Wears partial dentures     Wears partial dentures     upper       Past Surgical History:          Procedure Laterality Date    BACK SURGERY      COLONOSCOPY      COLONOSCOPY N/A 3/16/2021    COLONOSCOPY performed by Aleks Hughes MD at [address] Left     ankle    TONSILLECTOMY         Medications Prior to Admission:      Prior to Admission medications    Medication Sig Start Date End Date Taking?  Authorizing Provider   acetaminophen (TYLENOL) 325 MG tablet Take 650 mg by mouth every 6 hours as needed for Pain   Yes Historical Provider, MD   ferrous sulfate (FE TABS 325) 325 (65 Fe) MG EC tablet Take 325 mg by mouth 3 times daily (with meals)   Yes Historical Provider, MD   lisinopril (PRINIVIL;ZESTRIL) 10 MG tablet Take 10 mg by mouth daily  6/5/20  Yes Historical Provider, MD   alfuzosin (UROXATRAL) 10 MG extended release tablet Take 10 mg by mouth daily  12/28/20  Yes Historical Provider, MD   esomeprazole (NEXIUM) 40 MG delayed release capsule Take 40 mg by mouth every morning (before breakfast)  2/6/21  Yes Historical Provider, MD Allergies:  Patient has no known allergies. Social History:      TOBACCO:   reports that he has never smoked. He has never used smokeless tobacco.  ETOH:   reports previous alcohol use. Family History:       Reviewed in detail and non contributory          Problem Relation Age of Onset    Cancer Mother     Cancer Father     Cancer Sister        REVIEW OF SYSTEMS:   Pertinent positives as noted in the HPI. All other systems reviewed and negative. PHYSICAL EXAM PERFORMED:    /79   Pulse 138   Temp 97.8 °F (36.6 °C) (Oral)   Resp 21   Wt 220 lb (99.8 kg)   SpO2 96%   BMI 26.78 kg/m²     General appearance:  No apparent distress, cooperative. HEENT:  Normal cephalic, atraumatic without obvious deformity. Conjunctivae/corneas clear. Neck: Supple, with full range of motion. No cervical lymphadenopathy  Respiratory:  Normal respiratory effort. Clear to auscultation, bilaterally without Rales/Wheezes/Rhonchi. Cardiovascular:  Regular rate and rhythm with normal S1/S2 without murmurs, rubs or gallops. Abdomen: Soft, non-tender, non-distended, normal bowel sounds. Musculoskeletal:  No edema noted bilaterally. No tenderness on palpation   Skin: no rash visible  Neurologic:  Neurologically intact without any focal sensory/motor deficits. grossly non-focal.  Psychiatric:  Alert and oriented, normal mood  Peripheral Pulses: +2 palpable, equal bilaterally       Labs:     Recent Labs     02/01/22  1359   WBC 5.0   HGB 12.3*   HCT 38.3*        Recent Labs     02/01/22  1359      K 4.7      CO2 20*   BUN 18   CREATININE 1.2   CALCIUM 9.2     Recent Labs     02/01/22  1359   AST 14*   ALT 9*   BILITOT <0.2   ALKPHOS 122     No results for input(s): INR in the last 72 hours.   Recent Labs     02/01/22  1359   TROPONINI <0.01       Urinalysis:    No results found for: Thanh Henle, BACTERIA, RBCUA, BLOODU, SPECGRAV, GLUCOSEU    Radiology:       No orders to display           Active Hospital Problems    Diagnosis Date Noted    Tachycardia [R00.0] 02/01/2022       Patient is a 50-year-old male with past medical history of hypertension hyperlipidemia who presents to the hospital due to palpitations, patient mentions he was at his physician's office to donate blood. Patient mentions his heart rate was checked and it was in the 130s so he was sent to the hospital for further evaluation. Patient denies chest pain nausea vomiting diarrhea constipation dysuria at time of my evaluation. Assessment  Tachycardia, rule out arrhythmia  Hypertension  Hyperlipidemia  GERD    Plan  Consulted cardiology from ED, given Cardizem bolus, heart rate currently rate controlled, monitor on cardiac telemetry, as needed Cardizem ordered  Check echocardiogram, TSH  Resume home medications  DVT prophylaxis-Lovenox  Diet: ADULT DIET; Regular  Code Status: Full Code    PT/OT Eval Status: ordered    Dispo - pending clinical improvement       Sagar Byers MD    The note was completed using EMR and Dragon dictation system. Every effort was made to ensure accuracy; however, inadvertent computerized transcription errors may be present. Thank you Gerhard Mansfield MD for the opportunity to be involved in this patient's care. If you have any questions or concerns please feel free to contact me at [phone].     Sagar Byers MD

## 2022-02-02 NOTE — PROGRESS NOTES
Cardiac Electrophysiology Progress Note     Admit Date: 2022     Reason for follow up: SVT, atrial fibrillation    HPI and Interval History:   The patient is an 81year old male with past medical history significant for HTN, HLD, GERD and gout who presented to the ED complaining of tachycardia after he went to donate blood. Was noted to be in SVT in the 140s, narrow complex with no discernable P waves. He was given adenosine 6 mg and 12 mg but did not terminate the tachycardia. There was atrial fibrillation seen underneath which adenosine can do that. Overnight he has had periods of atrial fibrillation which has been rate controlled however, he continues to have SVT in the 130s frequently as well. Denies any symptoms associated with these. Typically active without any limitations. Denies any CP, SOB or palpitations. Feeling well this morning. Physical Examination:  Vitals:    22 0822   BP:    Pulse:    Resp:    Temp:    SpO2: 97%        Intake/Output Summary (Last 24 hours) at 2022 0909  Last data filed at 2022 0557  Gross per 24 hour   Intake 397.72 ml   Output --   Net 397.72 ml     In: 397.7 [P.O.:300; I.V.:97.7]  Out: -    Wt Readings from Last 3 Encounters:   22 229 lb 11.5 oz (104.2 kg)   21 215 lb (97.5 kg)     Temp  Av.7 °F (36.5 °C)  Min: 97.2 °F (36.2 °C)  Max: 98.1 °F (36.7 °C)  Pulse  Av.1  Min: 82  Max: 140  BP  Min: 96/64  Max: 146/103  SpO2  Av.3 %  Min: 92 %  Max: 100 %    · Telemetry: SVT in the 130s. · Constitutional: Alert, in no acute distress. Appears stated age. · Head: Normocephalic and atraumatic. · Eyes: Conjunctivae normal. EOM are normal.   · Neck: Neck supple. No lymphadenopathy. No rigidity. No JVD present. · Cardiovascular: Fast rate, regular rhythm. No murmurs, rubs or gallops. No S3 or S4.  · Pulmonary/Chest: Clear breath sounds bilaterally. No crackles, wheezes or rhonchi. No respiratory accessory muscle use. control for now - add metoprolol 25mg TID, titrate as needed   - hold lisinopril for now   - awaiting echo    Paroxysmal atrial fibrillation   - noted on telemetry overnight, mostly rate controlled   - on Cardizem drip, adding metoprolol and would wean drip    - CHADS2-VASc 3 (age, HTN) discussed the increased risk of stroke with A fib and anticoagulation, pt agreeable to anticoagulation. Will start Eliquis 5mg BID   - if does not convert, could consider EDWARD/cardioversion   - can also consider future ablation    Essential HTN    - SBP in the 90s this morning    - would hold lisinopril for now to allow titration of rate control meds    Discussed with Dr. Gabby Roque.     DUKE David  [facility] [address], [address] [place] [address]  Phone: [phone]  Fax: [phone]    Electronically signed by DUKE Wilson - CNP on 2/2/2022 at 9:09 AM

## 2022-02-02 NOTE — CONSULTS
Cardiac Electrophysiology Consultation   Date: 2/2/2022  Admit Date:  2/1/2022  Reason for Consultation: SVT  Consult Requesting Physician: Serenity Andrade MD     Chief Complaint   Patient presents with    Tachycardia     states he went to donate blood and had palpitations, states PCP sent him for eval. Pt denies CP or SOB     HPI: Radha Price is a 81 y.o. male with past medical history noted below but no significant cardiac medical history who was noted to be in tachycardia when he went to donate blood. He was sent by the PCP to the ED for further evaluation. He reports palpitations on and off but is mostly asymptomatic. No chest pain, dizziness, presycnope or syncope in the past. No alleviating or exacerbating factors. In the ED he was found to be in SVT 140s, narrow complex with no discernable P waves. He was given adenosine 6 mg and 12 mg but did not terminate the tachycardia. There was atrial fibrillation seen underneath which adenosine can do that. EP consulted for further management. Past Medical History:   Diagnosis Date    Arthritis     Cancer ([address]     skin-leg    Enlarged prostate     GERD (gastroesophageal reflux disease)     Gout     Hyperlipidemia     Hypertension     Neuropathy     Wears glasses     Wears partial dentures     Wears partial dentures     upper        Past Surgical History:   Procedure Laterality Date    BACK SURGERY      COLONOSCOPY      COLONOSCOPY N/A 3/16/2021    COLONOSCOPY performed by Micaela Murray MD at [address] Left     ankle    TONSILLECTOMY         No Known Allergies    Social History:  Reviewed. reports that he has never smoked. He has never used smokeless tobacco. He reports previous alcohol use. He reports that he does not use drugs. Family History:  Reviewed. family history includes Cancer in his father, mother, and sister. No premature CAD.      Review of System:  Pertinent positives and negatives are mentioned in the HPI. The rest of the systems are negative. Physical Examination:  Vitals:    22 0324   BP: 96/64   Pulse: 86   Resp: 18   Temp: 97.2 °F (36.2 °C)   SpO2: 96%        Intake/Output Summary (Last 24 hours) at 2022 0817  Last data filed at 2022 0557  Gross per 24 hour   Intake 397.72 ml   Output --   Net 397.72 ml     In: 397.7 [P.O.:300; I.V.:97.7]  Out: -    Wt Readings from Last 3 Encounters:   22 229 lb 11.5 oz (104.2 kg)   21 215 lb (97.5 kg)     Temp  Av.7 °F (36.5 °C)  Min: 97.2 °F (36.2 °C)  Max: 98.1 °F (36.7 °C)  Pulse  Av.6  Min: 82  Max: 140  BP  Min: 96/64  Max: 146/103  SpO2  Av.3 %  Min: 92 %  Max: 100 %    · Telemetry: SVT  · Constitutional: Alert. Oriented to person, place, and time. No distress. · Head: Normocephalic and atraumatic. · Mouth/Throat: Lips appear moist. Oropharynx is clear and moist.  · Eyes: Conjunctivae normal. EOM are normal.   · Neck: Neck supple. Supple, no JVD  · Cardiovascular: Regular tachycardic  · Pulmonary/Chest: Bilateral respiratory sounds present. No wheeze or crackles  · Abdominal: Soft. Normal bowel sounds present. No distension, No tenderness. · Musculoskeletal:No pitting edema . · Neurological: Alert and oriented. Grossly normal with no focal neurological deficit. · Skin: Skin is warm and dry. .  · Psychiatric: No anxiety nor agitation. ·   Labs:  Reviewed. Recent Labs     22  1359 22  0513    136   K 4.7 4.2    104   CO2 20* 18*   BUN 18 19   CREATININE 1.2 1.1     Recent Labs     22  1359 22  0513   WBC 5.0 4.9   HGB 12.3* 11.5*   HCT 38.3* 34.7*   MCV 83.9 83.5    230     Lab Results   Component Value Date    TROPONINI <0.01 2022     No results found for: BNP  No results found for: PROTIME, INR  No results found for: CHOL, HDL, TRIG    Diagnostic and imaging results reviewed. ECG: Narrow complex tachycardia.  138 bpm.  Echo: none  Cath:

## 2022-02-02 NOTE — PROGRESS NOTES
4 Eyes Skin Assessment     The patient is being assess for  Admission    I agree that 2 RN's have performed a thorough Head to Toe Skin Assessment on the patient. ALL assessment sites listed below have been assessed. Areas assessed by both nurses:   [x]   Head, Face, and Ears   [x]   Shoulders, Back, and Chest  [x]   Arms, Elbows, and Hands   [x]   Coccyx, Sacrum, and IschIum  [x]   Legs, Feet, and Heels        Does the Patient have Skin Breakdown?   No         Azael Prevention initiated:  NA   Wound Care Orders initiated:  NA      [facility] nurse consulted for Pressure Injury (Stage 3,4, Unstageable, DTI, NWPT, and Complex wounds), New and Established Ostomies:  NA      Nurse 1 eSignature: Electronically signed by Mel Monroe RN on 2/2/22 at 12:35 AM EST    **SHARE this note so that the co-signing nurse is able to place an eSignature**    Nurse 2 eSignature: Electronically signed by Kandy Pavon RN on 2/2/22 at 4:01 AM EST

## 2022-02-02 NOTE — PROGRESS NOTES
Occupational Therapy   Occupational Therapy Initial Assessment  Date: 2022   Patient Name: Sheng Ruth  MRN: [id number]     : 1935    Date of Service: 2022    Discharge Recommendations:   DC to home when medically stable, no further OT services indicated at this time  OT Equipment Recommendations  Other: recommend Tub Transfer Bench    Assessment   Assessment: pt is an 80 y/o male who was admitted to hospital w/ tachycardia, being tx'ed for metabolic acidosis. 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 Currently he is functioning close to baseline, is not using AD in room but has to push IV pole. Some unsteadiness & forgetfulness noted. Anticipate he is IND w/ all ADLs. Pt would benefit from using TTB as he reports difficulty stepping over tub ledge--shown TTB on computer screen, can purchase at [facility] or [employer]. No further OT services indicated at this time  Prognosis: Good  Decision Making: Low Complexity  OT Education: OT Role;Equipment  Patient Education: educated on purpose of OT services and use of TTB  No Skilled OT: Independent with functional mobility; Independent with ADL's;At baseline function; No OT goals identified; Safe to return home  REQUIRES OT FOLLOW UP: No  Activity Tolerance  Activity Tolerance: Patient Tolerated treatment well;Patient limited by fatigue  Activity Tolerance: mild fatigue & SOB; having cardiac workup while in hospital  Safety Devices  Safety Devices in place: Yes  Type of devices: Call light within reach;Gait belt;Left in chair;Nurse notified           Patient Diagnosis(es): The encounter diagnosis was Tachycardia. has a past medical history of Arthritis, Cancer ([facility] [address] Enlarged prostate, GERD (gastroesophageal reflux disease), Gout, Hyperlipidemia, Hypertension, Neuropathy, Wears glasses, Wears partial dentures, and Wears partial dentures.    has a past surgical history that includes Colonoscopy; back surgery; fracture surgery (Left); Tonsillectomy; and Colonoscopy (N/A, 3/16/2021). Restrictions  Restrictions/Precautions  Restrictions/Precautions: Up Ad Susana  Position Activity Restriction  Other position/activity restrictions: low fat/art diet, teley    Subjective   General  Chart Reviewed: Yes  Patient assessed for rehabilitation services?: Yes  Additional Pertinent Hx: per Dr Razia Lemon H&P note on 2/1/22:\"Patient is a 71-year-old male with past medical history of hypertension hyperlipidemia who presents to the hospital due to palpitations, patient mentions he was at his physician's office to donate blood. Patient mentions his heart rate was checked and it was in the 130s so he was sent to the hospital for further evaluation. Patient denies chest pain nausea vomiting diarrhea constipation dysuria at time of my evaluation. \"  Family / Caregiver Present: No  Referring Practitioner: Myra Ricardo  Diagnosis: tachycardia  Subjective  Subjective: met in room, pt watching TV, denies pain  General Comment  Comments: per RN ok to evaluate     Social/Functional History  Social/Functional History  Lives With: Alone  Type of Home: House (condo)  Home Layout: Two level (2ndl fl condo)  Home Access: Stairs to enter with rails  Entrance Stairs - Number of Steps: 4-5  Entrance Stairs - Rails: Both  Bathroom Shower/Tub: Tub/Shower unit  Bathroom Toilet: Standard  Bathroom Accessibility: Walker accessible  ADL Assistance: Independent  Homemaking Assistance: Independent  Homemaking Responsibilities: Yes  Ambulation Assistance: Independent  Transfer Assistance: Independent  Active : Yes  Occupation: Full time employment,Retired  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 [employer]; used to be a [employer]  Additional Comments: denies falls       Objective   Vision: Within Functional Limits (reading glasses)  Hearing: Within functional limits    Orientation  Overall Orientation Status: Within Normal Limits     Balance  Sitting Balance: Independent  Standing Balance: Independent  Standing Balance  Time: @ 2 minutes  Activity: during fxl mob  Comment: no device  Functional Mobility  Functional - Mobility Device: No device  Activity: To/from bathroom  Assist Level: Modified independent   Functional Mobility Comments: safety concerns to remember to bring IV pole along w/ him; moves impulsively, 1 mild episode of unsteadiness but able to recover  ADL  Feeding: Independent  Additional Comments: anticipate pt is IND w/ all ADLs, he declined the need to complete ADLs this AM  Tone RUE  RUE Tone: Normotonic  Tone LUE  LUE Tone: Normotonic  Coordination  Movements Are Fluid And Coordinated: Yes        Transfers  Sit to stand: Independent  Stand to sit:  Independent  Transfer Comments: can be impulsive, no AD but has to bring IV pole along     Cognition  Overall Cognitive Status: WNL        Sensation  Overall Sensation Status: Impaired  Additional Comments: has neuropathy in feet, numbness & tingling feet & ankles        LUE AROM (degrees)  LUE AROM : WNL  Left Hand AROM (degrees)  Left Hand AROM: WNL  RUE AROM (degrees)  RUE AROM : WNL  Right Hand AROM (degrees)  Right Hand AROM: WNL  LUE Strength  Gross LUE Strength: WFL  RUE Strength  Gross RUE Strength: WFL                   Plan   Plan  Plan weeks: EVAL ONLY--DC OT SERVICES      AM-PAC Score        AM-PAC Inpatient Daily Activity Raw Score: 24 (02/02/22 1020)  AM-PAC Inpatient ADL T-Scale Score : 57.54 (02/02/22 1020)  ADL Inpatient CMS 0-100% Score: 0 (02/02/22 1020)  ADL Inpatient CMS G-Code Modifier : [address] (02/02/22 1020)    Goals  Patient Goals   Patient goals : seen for OT evaluation only, he is functioning at baseline; denies need for OT services at this time       Therapy Time   Individual Concurrent Group Co-treatment   Time In 0950         Time Out 1020         Minutes 30         Timed Code Treatment Minutes: 30 Minutes Louisa Sandoval, OTR/L #[id number]

## 2022-02-02 NOTE — PROGRESS NOTES
18*   BUN 18 19   CREATININE 1.2 1.1   CALCIUM 9.2 8.9     Recent Labs     02/01/22  1359   AST 14*   ALT 9*   BILITOT <0.2   ALKPHOS 122     No results for input(s): INR in the last 72 hours. Recent Labs     02/01/22  1359   TROPONINI <0.01       Urinalysis:    No results found for: Dorla Benes, BACTERIA, RBCUA, BLOODU, SPECGRAV, GLUCOSEU    Radiology:  No orders to display           Assessment/Plan:    Active Hospital Problems    Diagnosis     Tachycardia [R00.0]      1. Tachyarrhythmia, patient received Cardizem bolus then Cardizem drip, telemetry monitoring, cardiology consulted echo ordered, TSH ordered, patient needs further work-up in the hospital as tachycardia in certain cases could be due to underlying cardiac disease. Echo pending  2. Essential hypertension, continue p.o. medications  3. GERD, PPI  4. Hyperlipidemia, continue statin  5. Metabolic acidosis, worsening since yesterday, mild though, will repeat BMP in a.m.  6.  Anemia, appears chronic, follow-up as outpatient      Diet: ADULT DIET;  Regular  Code Status: Full Code    Souleymane Arvizu MD

## 2022-02-02 NOTE — PROGRESS NOTES
Physical Therapy  PT referral received and chart reviewed. Pt reports no d/c concerns. H/O diabetic neuropathy (B feet) although denies any amb concerns. Spoke with nursing. Will sign off at this time.   Denny Carreno, PT

## 2022-02-02 NOTE — ED NOTES
Perfect serve down. Attempted to call [phone] for hospitalist no answer. regarding patient and heart rate. Patient is asymptomatic denies chest pain or any other complaints. This nurse gave 5mg Diltiazem per order and MAR. Patient Heart Rate 137. No acute signs of distress at this time.       Chava Powell, RN  02/01/22 [address], RN  02/01/22 0817

## 2022-02-03 LAB
ANION GAP SERPL CALCULATED.3IONS-SCNC: 13 MMOL/L (ref 3–16)
BUN BLDV-MCNC: 21 MG/DL (ref 7–20)
CALCIUM SERPL-MCNC: 9.1 MG/DL (ref 8.3–10.6)
CHLORIDE BLD-SCNC: 103 MMOL/L (ref 99–110)
CO2: 18 MMOL/L (ref 21–32)
CREAT SERPL-MCNC: 1.3 MG/DL (ref 0.8–1.3)
EKG DIAGNOSIS: NORMAL
EKG Q-T INTERVAL: 356 MS
EKG QRS DURATION: 84 MS
EKG QTC CALCULATION (BAZETT): 537 MS
EKG R AXIS: -22 DEGREES
EKG T AXIS: 67 DEGREES
EKG VENTRICULAR RATE: 137 BPM
GFR AFRICAN AMERICAN: >60
GFR NON-AFRICAN AMERICAN: 52
GLUCOSE BLD-MCNC: 113 MG/DL (ref 70–99)
HCT VFR BLD CALC: 35.5 % (ref 40.5–52.5)
HEMOGLOBIN: 11.7 G/DL (ref 13.5–17.5)
MCH RBC QN AUTO: 27.5 PG (ref 26–34)
MCHC RBC AUTO-ENTMCNC: 32.9 G/DL (ref 31–36)
MCV RBC AUTO: 83.6 FL (ref 80–100)
PDW BLD-RTO: 15.9 % (ref 12.4–15.4)
PLATELET # BLD: 254 K/UL (ref 135–450)
PMV BLD AUTO: 6.7 FL (ref 5–10.5)
POTASSIUM REFLEX MAGNESIUM: 4.5 MMOL/L (ref 3.5–5.1)
RBC # BLD: 4.24 M/UL (ref 4.2–5.9)
SODIUM BLD-SCNC: 134 MMOL/L (ref 136–145)
WBC # BLD: 5.5 K/UL (ref 4–11)

## 2022-02-03 PROCEDURE — 2580000003 HC RX 258: Performed by: INTERNAL MEDICINE

## 2022-02-03 PROCEDURE — 6370000000 HC RX 637 (ALT 250 FOR IP): Performed by: NURSE PRACTITIONER

## 2022-02-03 PROCEDURE — 99233 SBSQ HOSP IP/OBS HIGH 50: CPT | Performed by: NURSE PRACTITIONER

## 2022-02-03 PROCEDURE — 80048 BASIC METABOLIC PNL TOTAL CA: CPT

## 2022-02-03 PROCEDURE — 2060000000 HC ICU INTERMEDIATE R&B

## 2022-02-03 PROCEDURE — 2500000003 HC RX 250 WO HCPCS: Performed by: INTERNAL MEDICINE

## 2022-02-03 PROCEDURE — 85027 COMPLETE CBC AUTOMATED: CPT

## 2022-02-03 PROCEDURE — 94761 N-INVAS EAR/PLS OXIMETRY MLT: CPT

## 2022-02-03 PROCEDURE — 6370000000 HC RX 637 (ALT 250 FOR IP): Performed by: INTERNAL MEDICINE

## 2022-02-03 PROCEDURE — 36415 COLL VENOUS BLD VENIPUNCTURE: CPT

## 2022-02-03 PROCEDURE — 99223 1ST HOSP IP/OBS HIGH 75: CPT | Performed by: INTERNAL MEDICINE

## 2022-02-03 PROCEDURE — 93010 ELECTROCARDIOGRAM REPORT: CPT | Performed by: INTERNAL MEDICINE

## 2022-02-03 PROCEDURE — 6360000002 HC RX W HCPCS: Performed by: NURSE PRACTITIONER

## 2022-02-03 RX ORDER — DIGOXIN 0.25 MG/ML
250 INJECTION INTRAMUSCULAR; INTRAVENOUS
Status: COMPLETED | OUTPATIENT
Start: 2022-02-03 | End: 2022-02-03

## 2022-02-03 RX ORDER — AMIODARONE HYDROCHLORIDE 200 MG/1
200 TABLET ORAL 2 TIMES DAILY
Status: DISCONTINUED | OUTPATIENT
Start: 2022-02-03 | End: 2022-02-07 | Stop reason: HOSPADM

## 2022-02-03 RX ORDER — METOPROLOL SUCCINATE 50 MG/1
50 TABLET, EXTENDED RELEASE ORAL 2 TIMES DAILY
Status: DISCONTINUED | OUTPATIENT
Start: 2022-02-03 | End: 2022-02-04

## 2022-02-03 RX ORDER — METOPROLOL SUCCINATE 25 MG/1
25 TABLET, EXTENDED RELEASE ORAL DAILY
Status: DISCONTINUED | OUTPATIENT
Start: 2022-02-03 | End: 2022-02-03

## 2022-02-03 RX ADMIN — METOPROLOL SUCCINATE 50 MG: 50 TABLET, EXTENDED RELEASE ORAL at 20:47

## 2022-02-03 RX ADMIN — AMIODARONE HYDROCHLORIDE 200 MG: 200 TABLET ORAL at 20:47

## 2022-02-03 RX ADMIN — SODIUM CHLORIDE, PRESERVATIVE FREE 10 ML: 5 INJECTION INTRAVENOUS at 09:17

## 2022-02-03 RX ADMIN — METOPROLOL SUCCINATE 50 MG: 50 TABLET, EXTENDED RELEASE ORAL at 14:19

## 2022-02-03 RX ADMIN — PANTOPRAZOLE SODIUM 40 MG: 40 TABLET, DELAYED RELEASE ORAL at 06:35

## 2022-02-03 RX ADMIN — APIXABAN 5 MG: 5 TABLET, FILM COATED ORAL at 20:47

## 2022-02-03 RX ADMIN — DIGOXIN 250 MCG: 0.25 INJECTION INTRAMUSCULAR; INTRAVENOUS at 14:19

## 2022-02-03 RX ADMIN — DILTIAZEM HYDROCHLORIDE 2.5 MG/HR: 5 INJECTION INTRAVENOUS at 15:10

## 2022-02-03 RX ADMIN — DIGOXIN 250 MCG: 0.25 INJECTION INTRAMUSCULAR; INTRAVENOUS at 17:18

## 2022-02-03 RX ADMIN — FERROUS SULFATE TAB EC 324 MG (65 MG FE EQUIVALENT) 324 MG: 324 (65 FE) TABLET DELAYED RESPONSE at 17:17

## 2022-02-03 RX ADMIN — FERROUS SULFATE TAB EC 324 MG (65 MG FE EQUIVALENT) 324 MG: 324 (65 FE) TABLET DELAYED RESPONSE at 11:39

## 2022-02-03 RX ADMIN — AMIODARONE HYDROCHLORIDE 200 MG: 200 TABLET ORAL at 14:19

## 2022-02-03 RX ADMIN — FERROUS SULFATE TAB EC 324 MG (65 MG FE EQUIVALENT) 324 MG: 324 (65 FE) TABLET DELAYED RESPONSE at 09:16

## 2022-02-03 RX ADMIN — DIGOXIN 250 MCG: 0.25 INJECTION INTRAMUSCULAR; INTRAVENOUS at 11:39

## 2022-02-03 RX ADMIN — APIXABAN 5 MG: 5 TABLET, FILM COATED ORAL at 09:16

## 2022-02-03 RX ADMIN — METOPROLOL SUCCINATE 25 MG: 25 TABLET, FILM COATED, EXTENDED RELEASE ORAL at 09:16

## 2022-02-03 ASSESSMENT — PAIN SCALES - GENERAL
PAINLEVEL_OUTOF10: 0

## 2022-02-03 NOTE — ACP (ADVANCE CARE PLANNING)
Advance Care Planning     Advance Care Planning Activator (Inpatient)  Conversation Note      Date of ACP Conversation: 2/3/2022     Conversation Conducted with: Patient with Decision Making Capacity    ACP Activator: Deepthi Garcia, [address] [facility] Decision Maker:     Current Designated Health Care Decision Maker:     Primary Decision Maker: [place] - Niece/Nephew - [phone]    Today we documented Decision Maker(s) consistent with Legal Next of Kin hierarchy. Care Preferences    Ventilation: \"If you were in your present state of health and suddenly became very ill and were unable to breathe on your own, what would your preference be about the use of a ventilator (breathing machine) if it were available to you? \"      Would the patient desire the use of ventilator (breathing machine)?: yes    \"If your health worsens and it becomes clear that your chance of recovery is unlikely, what would your preference be about the use of a ventilator (breathing machine) if it were available to you? \"     Would the patient desire the use of ventilator (breathing machine)?: No      Resuscitation  \"CPR works best to restart the heart when there is a sudden event, like a heart attack, in someone who is otherwise healthy. Unfortunately, CPR does not typically restart the heart for people who have serious health conditions or who are very sick. \"    \"In the event your heart stopped as a result of an underlying serious health condition, would you want attempts to be made to restart your heart (answer \"yes\" for attempt to resuscitate) or would you prefer a natural death (answer \"no\" for do not attempt to resuscitate)? \" yes       [x] Yes   [] No   Educated Patient / Manisha Thao regarding differences between Advance Directives and portable DNR orders.     Length of ACP Conversation in minutes:  10  Conversation Outcomes:  [x] ACP discussion completed  [] Existing advance directive reviewed with patient; no changes to patient's previously recorded wishes  [] New Advance Directive completed  [] Portable Do Not Rescitate prepared for Provider review and signature  [] POLST/POST/MOLST/MOST prepared for Provider review and signature      Follow-up plan:    [] Schedule follow-up conversation to continue planning  [] Referred individual to Provider for additional questions/concerns   [] Advised patient/agent/surrogate to review completed ACP document and update if needed with changes in condition, patient preferences or care setting    [x] This note routed to one or more involved healthcare providers

## 2022-02-03 NOTE — PROGRESS NOTES
Cardiac Electrophysiology Progress Note     Admit Date: 2022     Reason for follow up: SVT, atrial fibrillation    HPI and Interval History:   The patient is an 81year old male with past medical history significant for HTN, HLD, GERD and gout who presented to the ED complaining of tachycardia after he went to donate blood. Was noted to be in SVT in the 140s, narrow complex with no discernable P waves. He was given adenosine 6 mg and 12 mg but did not terminate the tachycardia. There was atrial fibrillation seen underneath which adenosine can do that. Has continued to have periods of atrial fibrillation and SVT. He is feeling well this morning, no cardiac complaints. Has been in atrial fibrillation mostly with controlled v-rates, rare SVT overnight. Some periods of sinus rhythm as well but brief. Echo yesterday with EF 20-25%. Physical Examination:  Vitals:    22 0821   BP:    Pulse:    Resp:    Temp:    SpO2: 96%        Intake/Output Summary (Last 24 hours) at 2/3/2022 0849  Last data filed at 2/3/2022 0636  Gross per 24 hour   Intake 875.02 ml   Output --   Net 875.02 ml     In: 875 [P.O.:780; I.V.:95]  Out: -    Wt Readings from Last 3 Encounters:   22 229 lb 0.9 oz (103.9 kg)   21 215 lb (97.5 kg)     Temp  Av.8 °F (36.6 °C)  Min: 97.4 °F (36.3 °C)  Max: 98.4 °F (36.9 °C)  Pulse  Av  Min: 67  Max: 133  BP  Min: 91/64  Max: 131/77  SpO2  Av.2 %  Min: 95 %  Max: 97 %    · Telemetry: Atrial fibrillation v-rates in the 100s. · Constitutional: Alert, in no acute distress. Appears stated age. · Head: Normocephalic and atraumatic. · Eyes: Conjunctivae normal. EOM are normal.   · Neck: Neck supple. No lymphadenopathy. No rigidity. No JVD present. · Cardiovascular: Slightly fast rate, IRR. No murmurs, rubs or gallops. No S3 or S4.  · Pulmonary/Chest: Clear breath sounds bilaterally. No crackles, wheezes or rhonchi. No respiratory accessory muscle use.     · Abdominal: Soft. Normal bowel sounds present. No distension, No tenderness. · Musculoskeletal: No tenderness. No edema    · Lymphadenopathy: Has no cervical adenopathy. · Neurological: Alert and oriented. No gross deficits. · Skin: Skin is warm and dry. No rash, lesions, ulcerations noted. · Psychiatric: No anxiety or agitation. Labs, diagnostic and imaging results reviewed. Reviewed. Recent Labs     22  1359 22  0513 22  0521    136 134*   K 4.7 4.2 4.5    104 103   CO2 20* 18* 18*   BUN 18 19 21*   CREATININE 1.2 1.1 1.3     Recent Labs     22  1359 22  0513 22  0520   WBC 5.0 4.9 5.5   HGB 12.3* 11.5* 11.7*   HCT 38.3* 34.7* 35.5*   MCV 83.9 83.5 83.6    230 254     Lab Results   Component Value Date    TROPONINI <0.01 2022     Estimated Creatinine Clearance: 51 mL/min (based on SCr of 1.3 mg/dL). No results found for: BNP  No results found for: PROTIME, INR  No results found for: CHOL, HDL, TRIG    Scheduled Meds:   metoprolol succinate  25 mg Oral Daily    apixaban  5 mg Oral BID    sodium chloride flush  10 mL IntraVENous 2 times per day    pantoprazole  40 mg Oral QAM AC    ferrous sulfate  324 mg Oral TID WC    [Held by provider] lisinopril  10 mg Oral Daily     Continuous Infusions:   sodium chloride      dilTIAZem 2.5 mg/hr (22 0636)     PRN Meds:sodium chloride flush, sodium chloride, potassium chloride **OR** potassium alternative oral replacement **OR** potassium chloride, potassium chloride, magnesium sulfate, promethazine **OR** ondansetron, magnesium hydroxide, acetaminophen **OR** acetaminophen, dilTIAZem, perflutren lipid microspheres     EC22  SVT at 137 BPM. Non-specific ST-T wave changes. Echo:22   Suboptimal image quality. Patient appears to be in atrial fibrillation. Overall left ventricular systolic function appears severely reduced.    Ejection fraction is visually estimated to be 20-25% with diffuse   hypokinesis. Normal left ventricular wall thickness and cavity size. The right ventricle appears normal in size with moderately reduced systolic   function. Mild mitral and tricuspid regurgitation. Assessment and Plan:     SVT   - noted on EKG on 2/1/22   - v-rates in the 130s-140s, currently on Cardizem drip for this s/p adenosine x2 without much effect   - TSH, electrolytes WNL   - discussed possible ablation with Dr. Albina Joyner which he may be interested in   - continue with rate control for now - change metoprolol to Toprol given cardiomyopathy   - hold lisinopril for now given low BP but hopefully resume if BP improved    Paroxysmal atrial fibrillation   - noted on telemetry, rate controlled   - stop Cardizem drip, add Toprol   - CHADS2-VASc 3 (age, HTN) continue Eliquis 5mg BID   - consider future ablation especially given cardiomyopathy    Cardiomyopathy   - EF 20-25%   - could be tachycardia mediated    - change metoprolol to Toprol    - would add lisinopril back when BP improves   - ask general cardiology to see pt, ?ischemia eval      Essential HTN    - SBP in the 90s this morning    - holding lisinopril for now    Discussed with Dr. Albina Joyner.     DUKE Tatum  [facility], [address] [place] [address], [address] [place] [address]  Phone: [phone]  Fax: [phone]    Electronically signed by DUKE Briones - CNP on 2/3/2022 at 8:49 AM

## 2022-02-03 NOTE — CARE COORDINATION
INITIAL CASE MANAGEMENT ASSESSMENT    Reviewed chart, met with patient to assess possible discharge needs. Explained Case Management role/services. Living Situation: Lives alone in a Condo with 8 [address]. ADLs: Very Independent- retired , physically fit. DME: None    PT/OT Recs: OT 24/24, PT not evaluated. Active Services: None     Transportation: active      Medications: [facility] in [place] or [facility] on [address]. PCP: Gennaro Landaverde MD      HD/PD: N/A     PLAN/COMMENTS: Return home, no identified Discharge Needs. Provided Home care and SNF list.  Completed Advanced Care Planning. The Plan for Transition of Care is related to the following treatment goals: Return home    The Patient  was provided with a choice of provider and agrees   with the discharge plan. [x] Yes [] No    Freedom of choice list was provided with basic dialogue that supports the patient's individualized plan of care/goals, treatment preferences and shares the quality data associated with the providers. [x] Yes [] No    SW/CM provided contact information for patient or family to call with any questions. SW/CM will follow and assist as needed.

## 2022-02-03 NOTE — PROGRESS NOTES
Hospitalist Progress Note      PCP: Iram Lion MD    Date of Admission: 2/1/2022      Subjective: Feels okay, denies palpitation nausea vomiting abdominal pain chest pain or shortness of breath. No family member at bedside      Medications:  Reviewed    Infusion Medications    sodium chloride      dilTIAZem 2.5 mg/hr (02/03/22 0636)     Scheduled Medications    metoprolol tartrate  25 mg Oral TID    apixaban  5 mg Oral BID    sodium chloride flush  10 mL IntraVENous 2 times per day    pantoprazole  40 mg Oral QAM AC    ferrous sulfate  324 mg Oral TID WC    [Held by provider] lisinopril  10 mg Oral Daily     PRN Meds: sodium chloride flush, sodium chloride, potassium chloride **OR** potassium alternative oral replacement **OR** potassium chloride, potassium chloride, magnesium sulfate, promethazine **OR** ondansetron, magnesium hydroxide, acetaminophen **OR** acetaminophen, dilTIAZem, perflutren lipid microspheres      Intake/Output Summary (Last 24 hours) at 2/3/2022 0824  Last data filed at 2/3/2022 0636  Gross per 24 hour   Intake 875.02 ml   Output --   Net 875.02 ml       Physical Exam Performed:    BP 91/64   Pulse 87   Temp 98 °F (36.7 °C) (Oral)   Resp 18   Ht 6' 4\" (1.93 m)   Wt 229 lb 0.9 oz (103.9 kg)   SpO2 96%   BMI 27.88 kg/m²     General appearance: No apparent distress  Neck: Supple  Respiratory:  Normal respiratory effort. Clear to auscultation, bilaterally without Rales/Wheezes/Rhonchi. Cardiovascular: Regular rate and rhythm with normal S1/S2 without murmurs, rubs or gallops. Abdomen: Soft, non-tender, non-distended . Musculoskeletal: No clubbing, cyanosis  Skin: Skin color, texture, turgor normal.  No rashes or lesions.   Neurologic:  No focal weakness   Psychiatric: Alert and oriented  Capillary Refill: Brisk,3 seconds, normal   Peripheral Pulses: +2 palpable, equal bilaterally       Labs:   Recent Labs     02/01/22  1359 02/02/22  0513 02/03/22  0520   WBC 5.0 4.9 5.5   HGB 12.3* 11.5* 11.7*   HCT 38.3* 34.7* 35.5*    230 254     Recent Labs     02/01/22  1359 02/02/22  0513 02/03/22  0521    136 134*   K 4.7 4.2 4.5    104 103   CO2 20* 18* 18*   BUN 18 19 21*   CREATININE 1.2 1.1 1.3   CALCIUM 9.2 8.9 9.1     Recent Labs     02/01/22  1359   AST 14*   ALT 9*   BILITOT <0.2   ALKPHOS 122     No results for input(s): INR in the last 72 hours. Recent Labs     02/01/22  1359   TROPONINI <0.01       Urinalysis:    No results found for: [place], BACTERIA, RBCUA, BLOODU, SPECGRAV, GLUCOSEU    Radiology:  No orders to display           Assessment/Plan:    Active Hospital Problems    Diagnosis     Tachycardia [R00.0]      1. Tachyarrhythmia, SVT, patient received Cardizem bolus then Cardizem drip, still on 5 mg an hour telemetry monitoring, cardiology consulted, possible need for ablation, metoprolol added per cardiology, hold lisinopril. 2.  PAFIB, added metoprolol, started on eliquis. 3. Essential hypertension, continue p.o. medications  4. GERD, PPI  5. Hyperlipidemia, continue statin  6. Metabolic acidosis,relativly stable. 7.  Anemia, appears chronic, follow-up as outpatient    Diet: ADULT DIET;  Regular; Low Fat/Low Chol/High Fiber/MADELYN  Code Status: Full Code        Vikram Parkinson MD

## 2022-02-03 NOTE — CONSULTS
[address]  Cardiology Consult Note        CC:     cardiomyopathy           HPI:   This is a 81 y.o. male has been found to have new onset atrial flutter with RVR. He went to give blood to [facility] which he does every 8 weeks and was told that he was tachycardic and sent to the ER. In the ER the EKG shows heart rate in the 130+ range. The patient has no history of heart failure or heart disease. He certainly has not had any arrhythmias. He is not aware of any shortness of breath palpitations dizziness fatigue. He has history of hypertension hyperlipidemia.     An echocardiogram performed yesterday shows an EF of 25%      Past Medical History:   Diagnosis Date    Arthritis     Cancer ([address]     skin-leg    Enlarged prostate     GERD (gastroesophageal reflux disease)     Gout     Hyperlipidemia     Hypertension     Neuropathy     Wears glasses     Wears partial dentures     Wears partial dentures     upper      Past Surgical History:   Procedure Laterality Date    BACK SURGERY      COLONOSCOPY      COLONOSCOPY N/A 3/16/2021    COLONOSCOPY performed by Vladimir Silva MD at [address] Left     ankle    TONSILLECTOMY        Family History   Problem Relation Age of Onset    Cancer Mother     Cancer Father     Cancer Sister       Social History     Tobacco Use    Smoking status: Never Smoker    Smokeless tobacco: Never Used   Vaping Use    Vaping Use: Never used   Substance Use Topics    Alcohol use: Not Currently    Drug use: Never      No Known Allergies   metoprolol succinate  25 mg Oral Daily    digoxin  250 mcg IntraVENous Q2H    apixaban  5 mg Oral BID    sodium chloride flush  10 mL IntraVENous 2 times per day    pantoprazole  40 mg Oral QAM AC    ferrous sulfate  324 mg Oral TID WC    [Held by provider] lisinopril  10 mg Oral Daily       Review of Systems -   Constitutional: Negative for weight gain/loss; malaise, fever  Respiratory: Negative for Asthma;  cough and hemoptysis  Cardiovascular: Negative for palpitations,dizziness   Gastrointestinal: Negative for abd.pain; constipation/diarrhea;    Genitourinary: Negative for stones; hematuria; frequency hesitancy  Integumentt: Negative for rash or pruritis  Hematologic/lymphatic: Negative for blood dyscrasia; leukemia/lymphoma  Musculoskeletal: Negative for Connective tissue disease  Neurological:  Negative for Seizure   Behavioral/Psych:Negative for Bipolar disorder, Schizophrenia; Dementia  Endocrine: negative for thyroid, parathyroid disease      Intake/Output Summary (Last 24 hours) at 2/3/2022 1043  Last data filed at 2/3/2022 0636  Gross per 24 hour   Intake 635.02 ml   Output --   Net 635.02 ml       Physical Examination:    /69   Pulse 115   Temp 97.4 °F (36.3 °C) (Oral)   Resp 20   Ht 6' 4\" (1.93 m)   Wt 229 lb 0.9 oz (103.9 kg)   SpO2 96%   BMI 27.88 kg/m²    HEENT:  Face: Atraumatic, Conjunctiva: Pink; non icteric,  Mucous Memb:  Moist, No thyromegaly or Lymphadenopathy  Respiratory:  Resp Assessment: normal, Resp Auscultation: clear   Cardiovascular: Auscultation: nl S1 & S2, Palpation:  Nl PMI;  No heaves or thrills, JVP:  normal  Abdomen: Soft, non-tender, Normal bowel sounds,  No organomegaly  Extremities: No Cyanosis or Clubbing; Edema none  Neurological: Oriented to time, place, and person, Non-anxious  Psychiatric: Normal mood and affect  Skin: Warm and dry,  No rash seen      Current Facility-Administered Medications: metoprolol succinate (TOPROL XL) extended release tablet 25 mg, 25 mg, Oral, Daily  digoxin (LANOXIN) injection 250 mcg, 250 mcg, IntraVENous, Q2H  apixaban (ELIQUIS) tablet 5 mg, 5 mg, Oral, BID  sodium chloride flush 0.9 % injection 10 mL, 10 mL, IntraVENous, 2 times per day  sodium chloride flush 0.9 % injection 10 mL, 10 mL, IntraVENous, PRN  0.9 % sodium chloride infusion, 25 mL, IntraVENous, PRN  potassium chloride (KLOR-CON M) extended release tablet 40 mEq, 40 mEq, Oral, PRN **OR** potassium bicarb-citric acid (EFFER-K) effervescent tablet 40 mEq, 40 mEq, Oral, PRN **OR** potassium chloride 10 mEq/100 mL IVPB (Peripheral Line), 10 mEq, IntraVENous, PRN  potassium chloride 10 mEq/100 mL IVPB (Peripheral Line), 10 mEq, IntraVENous, PRN  magnesium sulfate 2000 mg in 50 mL IVPB premix, 2,000 mg, IntraVENous, PRN  promethazine (PHENERGAN) tablet 12.5 mg, 12.5 mg, Oral, Q6H PRN **OR** ondansetron (ZOFRAN) injection 4 mg, 4 mg, IntraVENous, Q6H PRN  magnesium hydroxide (MILK OF MAGNESIA) 400 MG/5ML suspension 30 mL, 30 mL, Oral, Daily PRN  acetaminophen (TYLENOL) tablet 650 mg, 650 mg, Oral, Q6H PRN **OR** acetaminophen (TYLENOL) suppository 650 mg, 650 mg, Rectal, Q6H PRN  dilTIAZem injection 5 mg, 5 mg, IntraVENous, Q8H PRN  dilTIAZem 125 mg in dextrose 5 % 125 mL infusion, 5-15 mg/hr, IntraVENous, Continuous  pantoprazole (PROTONIX) tablet 40 mg, 40 mg, Oral, QAM AC  ferrous sulfate EC tablet 324 mg, 324 mg, Oral, TID WC  [Held by provider] lisinopril (PRINIVIL;ZESTRIL) tablet 10 mg, 10 mg, Oral, Daily  perflutren lipid microspheres (DEFINITY) injection 1.65 mg, 1.5 mL, IntraVENous, ONCE PRN      Labs:   Recent Labs     02/02/22  0513 02/03/22  0520   WBC 4.9 5.5   HGB 11.5* 11.7*   HCT 34.7* 35.5*    254     Recent Labs     02/02/22  0513 02/02/22  0513 02/03/22  0521     --  134*   K 4.2  --  4.5   CO2 18*  --  18*   BUN 19  --  21*   CREATININE 1.1  --  1.3   GLUCOSE 104*   < > 113*    < > = values in this interval not displayed. Recent Labs     02/01/22  1359   TROPONINI <0.01     No results found for: HDL, LDLDIRECT, LDLCALC, TRIG  Recent Labs     02/01/22  1359   AST 14*   ALT 9*   LABALBU 4.1         EKG:   Atrial flutter rate of 150    ECHO:  Patient appears to be in atrial fibrillation. EF 20-25% with diffuse  hypokinesis. Normal left ventricular wall thickness and cavity size.   The right ventricle appears normal in size with moderately reduced systolic function. Mild mitral and tricuspid regurgitation. ASSESSMENT AND PLAN:        73-year-old patient with no history of coronary artery disease congestive heart failure or heart disease  He was found to have tachycardia when he went to give blood to [facility] blood bank. 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 I will add amiodarone for better rate control since the rate is still fast and attempted EDWARD guided cardioversion tomorrow      If he converts to sinus rhythm then amiodarone will be continued  He can have a flutter ablation as an outpatient  If he remains in sinus rhythm on amiodarone then there is a possibility that his LV function may improve.   We will repeat an echo in 3 months time      Mariam Garcia M.D  2/3/2022

## 2022-02-04 ENCOUNTER — HOSPITAL ENCOUNTER (INPATIENT)
Dept: CARDIAC CATH/INVASIVE PROCEDURES | Age: 87
Discharge: HOME | DRG: 309 | End: 2022-02-04
Payer: MEDICARE

## 2022-02-04 LAB
ANION GAP SERPL CALCULATED.3IONS-SCNC: 12 MMOL/L (ref 3–16)
BUN BLDV-MCNC: 19 MG/DL (ref 7–20)
CALCIUM SERPL-MCNC: 9.1 MG/DL (ref 8.3–10.6)
CHLORIDE BLD-SCNC: 106 MMOL/L (ref 99–110)
CO2: 20 MMOL/L (ref 21–32)
CREAT SERPL-MCNC: 1.3 MG/DL (ref 0.8–1.3)
EKG ATRIAL RATE: 82 BPM
EKG DIAGNOSIS: NORMAL
EKG P AXIS: 25 DEGREES
EKG P-R INTERVAL: 264 MS
EKG Q-T INTERVAL: 370 MS
EKG QRS DURATION: 90 MS
EKG QTC CALCULATION (BAZETT): 432 MS
EKG R AXIS: -13 DEGREES
EKG T AXIS: -10 DEGREES
EKG VENTRICULAR RATE: 82 BPM
GFR AFRICAN AMERICAN: >60
GFR NON-AFRICAN AMERICAN: 52
GLUCOSE BLD-MCNC: 102 MG/DL (ref 70–99)
HCT VFR BLD CALC: 37.1 % (ref 40.5–52.5)
HEMOGLOBIN: 12.2 G/DL (ref 13.5–17.5)
MCH RBC QN AUTO: 27.6 PG (ref 26–34)
MCHC RBC AUTO-ENTMCNC: 32.9 G/DL (ref 31–36)
MCV RBC AUTO: 83.7 FL (ref 80–100)
PDW BLD-RTO: 15.7 % (ref 12.4–15.4)
PLATELET # BLD: 252 K/UL (ref 135–450)
PMV BLD AUTO: 6.7 FL (ref 5–10.5)
POTASSIUM REFLEX MAGNESIUM: 4.7 MMOL/L (ref 3.5–5.1)
RBC # BLD: 4.43 M/UL (ref 4.2–5.9)
SARS-COV-2, NAAT: NOT DETECTED
SODIUM BLD-SCNC: 138 MMOL/L (ref 136–145)
WBC # BLD: 7.8 K/UL (ref 4–11)

## 2022-02-04 PROCEDURE — 92960 CARDIOVERSION ELECTRIC EXT: CPT | Performed by: INTERNAL MEDICINE

## 2022-02-04 PROCEDURE — 6370000000 HC RX 637 (ALT 250 FOR IP): Performed by: INTERNAL MEDICINE

## 2022-02-04 PROCEDURE — 93005 ELECTROCARDIOGRAM TRACING: CPT | Performed by: INTERNAL MEDICINE

## 2022-02-04 PROCEDURE — 80048 BASIC METABOLIC PNL TOTAL CA: CPT

## 2022-02-04 PROCEDURE — 99152 MOD SED SAME PHYS/QHP 5/>YRS: CPT | Performed by: INTERNAL MEDICINE

## 2022-02-04 PROCEDURE — 85027 COMPLETE CBC AUTOMATED: CPT

## 2022-02-04 PROCEDURE — 93312 ECHO TRANSESOPHAGEAL: CPT

## 2022-02-04 PROCEDURE — 94761 N-INVAS EAR/PLS OXIMETRY MLT: CPT

## 2022-02-04 PROCEDURE — 2580000003 HC RX 258: Performed by: INTERNAL MEDICINE

## 2022-02-04 PROCEDURE — 99233 SBSQ HOSP IP/OBS HIGH 50: CPT | Performed by: INTERNAL MEDICINE

## 2022-02-04 PROCEDURE — 2060000000 HC ICU INTERMEDIATE R&B

## 2022-02-04 PROCEDURE — 6370000000 HC RX 637 (ALT 250 FOR IP): Performed by: NURSE PRACTITIONER

## 2022-02-04 PROCEDURE — 36415 COLL VENOUS BLD VENIPUNCTURE: CPT

## 2022-02-04 PROCEDURE — 5A2204Z RESTORATION OF CARDIAC RHYTHM, SINGLE: ICD-10-PCS | Performed by: INTERNAL MEDICINE

## 2022-02-04 PROCEDURE — 93321 DOPPLER ECHO F-UP/LMTD STD: CPT

## 2022-02-04 PROCEDURE — 2500000003 HC RX 250 WO HCPCS

## 2022-02-04 PROCEDURE — 2580000003 HC RX 258

## 2022-02-04 PROCEDURE — 93010 ELECTROCARDIOGRAM REPORT: CPT | Performed by: INTERNAL MEDICINE

## 2022-02-04 PROCEDURE — 6370000000 HC RX 637 (ALT 250 FOR IP)

## 2022-02-04 PROCEDURE — 6360000002 HC RX W HCPCS

## 2022-02-04 PROCEDURE — 93325 DOPPLER ECHO COLOR FLOW MAPG: CPT

## 2022-02-04 PROCEDURE — B246ZZ4 ULTRASONOGRAPHY OF RIGHT AND LEFT HEART, TRANSESOPHAGEAL: ICD-10-PCS | Performed by: INTERNAL MEDICINE

## 2022-02-04 PROCEDURE — 87635 SARS-COV-2 COVID-19 AMP PRB: CPT

## 2022-02-04 RX ORDER — ATORVASTATIN CALCIUM 10 MG/1
10 TABLET, FILM COATED ORAL DAILY
Qty: 30 TABLET | Refills: 1 | OUTPATIENT
Start: 2022-02-04

## 2022-02-04 RX ORDER — AMIODARONE HYDROCHLORIDE 200 MG/1
200 TABLET ORAL 2 TIMES DAILY
Qty: 60 TABLET | Refills: 0 | OUTPATIENT
Start: 2022-02-04

## 2022-02-04 RX ORDER — FERROUS SULFATE TAB EC 324 MG (65 MG FE EQUIVALENT) 324 (65 FE) MG
324 TABLET DELAYED RESPONSE ORAL
Qty: 30 TABLET | OUTPATIENT
Start: 2022-02-04

## 2022-02-04 RX ORDER — SPIRONOLACTONE 25 MG/1
25 TABLET ORAL DAILY
Qty: 30 TABLET | Refills: 3 | OUTPATIENT
Start: 2022-02-04

## 2022-02-04 RX ORDER — METOPROLOL SUCCINATE 50 MG/1
50 TABLET, EXTENDED RELEASE ORAL DAILY
Status: DISCONTINUED | OUTPATIENT
Start: 2022-02-05 | End: 2022-02-07 | Stop reason: HOSPADM

## 2022-02-04 RX ORDER — SPIRONOLACTONE 25 MG/1
25 TABLET ORAL DAILY
Status: DISCONTINUED | OUTPATIENT
Start: 2022-02-04 | End: 2022-02-07 | Stop reason: HOSPADM

## 2022-02-04 RX ORDER — METOPROLOL SUCCINATE 50 MG/1
50 TABLET, EXTENDED RELEASE ORAL DAILY
Qty: 30 TABLET | Refills: 3 | OUTPATIENT
Start: 2022-02-05

## 2022-02-04 RX ADMIN — AMIODARONE HYDROCHLORIDE 200 MG: 200 TABLET ORAL at 08:30

## 2022-02-04 RX ADMIN — SODIUM CHLORIDE, PRESERVATIVE FREE 10 ML: 5 INJECTION INTRAVENOUS at 08:30

## 2022-02-04 RX ADMIN — FERROUS SULFATE TAB EC 324 MG (65 MG FE EQUIVALENT) 324 MG: 324 (65 FE) TABLET DELAYED RESPONSE at 16:57

## 2022-02-04 RX ADMIN — SPIRONOLACTONE 25 MG: 25 TABLET ORAL at 14:54

## 2022-02-04 RX ADMIN — SODIUM CHLORIDE, PRESERVATIVE FREE 10 ML: 5 INJECTION INTRAVENOUS at 20:51

## 2022-02-04 RX ADMIN — FERROUS SULFATE TAB EC 324 MG (65 MG FE EQUIVALENT) 324 MG: 324 (65 FE) TABLET DELAYED RESPONSE at 08:30

## 2022-02-04 RX ADMIN — AMIODARONE HYDROCHLORIDE 200 MG: 200 TABLET ORAL at 20:51

## 2022-02-04 RX ADMIN — PANTOPRAZOLE SODIUM 40 MG: 40 TABLET, DELAYED RELEASE ORAL at 06:26

## 2022-02-04 RX ADMIN — LISINOPRIL 10 MG: 10 TABLET ORAL at 08:30

## 2022-02-04 RX ADMIN — APIXABAN 5 MG: 5 TABLET, FILM COATED ORAL at 08:30

## 2022-02-04 RX ADMIN — APIXABAN 5 MG: 5 TABLET, FILM COATED ORAL at 20:51

## 2022-02-04 RX ADMIN — METOPROLOL SUCCINATE 50 MG: 50 TABLET, EXTENDED RELEASE ORAL at 08:30

## 2022-02-04 ASSESSMENT — PAIN SCALES - GENERAL
PAINLEVEL_OUTOF10: 0

## 2022-02-04 NOTE — PRE SEDATION
Sedation Pre-Procedure Note    Patient Name: Rick Ojeda   YOB: 1935  Room/Bed: Y3R-6281/5129-01  Medical Record Number: [id number]  Date: 2/4/2022   Time: 11:14 AM       Indication:  AF    Consent: Patient with AF with RVR and cardiomyopathy    Vital Signs:   Vitals:    02/04/22 0805   BP:    Pulse:    Resp:    Temp:    SpO2: 95%       Past Medical History:   has a past medical history of Arthritis, Cancer ([address] Enlarged prostate, GERD (gastroesophageal reflux disease), Gout, Hyperlipidemia, Hypertension, Neuropathy, Wears glasses, Wears partial dentures, and Wears partial dentures. Past Surgical History:   has a past surgical history that includes Colonoscopy; back surgery; fracture surgery (Left); Tonsillectomy; and Colonoscopy (N/A, 3/16/2021). Medications:   Scheduled Meds:    [START ON 2/5/2022] metoprolol succinate  50 mg Oral Daily    amiodarone  200 mg Oral BID    apixaban  5 mg Oral BID    sodium chloride flush  10 mL IntraVENous 2 times per day    pantoprazole  40 mg Oral QAM AC    ferrous sulfate  324 mg Oral TID WC    lisinopril  10 mg Oral Daily     Continuous Infusions:    sodium chloride       PRN Meds: sodium chloride flush, sodium chloride, potassium chloride **OR** potassium alternative oral replacement **OR** potassium chloride, potassium chloride, magnesium sulfate, promethazine **OR** ondansetron, magnesium hydroxide, acetaminophen **OR** acetaminophen, dilTIAZem, perflutren lipid microspheres  Home Meds:   Prior to Admission medications    Medication Sig Start Date End Date Taking?  Authorizing Provider   acetaminophen (TYLENOL) 325 MG tablet Take 650 mg by mouth every 6 hours as needed for Pain   Yes Historical Provider, MD   ferrous sulfate (FE TABS 325) 325 (65 Fe) MG EC tablet Take 325 mg by mouth 3 times daily (with meals)   Yes Historical Provider, MD   lisinopril (PRINIVIL;ZESTRIL) 10 MG tablet Take 10 mg by mouth daily  6/5/20  Yes Historical Provider, MD alfuzosin (UROXATRAL) 10 MG extended release tablet Take 10 mg by mouth daily  12/28/20  Yes Historical Provider, MD   esomeprazole (NEXIUM) 40 MG delayed release capsule Take 40 mg by mouth every morning (before breakfast)  2/6/21  Yes Historical Provider, MD     Coumadin Use Last 7 Days:  no  Antiplatelet drug therapy use last 7 days: no  Other anticoagulant use last 7 days: yes - Eliquis  Additional Medication Information:        Pre-Sedation Documentation and Exam:   I have personally completed a history, physical exam & review of systems for this patient (see notes).     Mallampati Airway Assessment:  normal    Prior History of Anesthesia Complications:   none    ASA Classification:  Class 2 - A normal healthy patient with mild systemic disease    Sedation/ Anesthesia Plan:   intravenous sedation    Medications Planned:   midazolam (Versed) intravenously, morphine intravenously and Brevital    Patient is an appropriate candidate for plan of sedation: yes    Electronically signed by Kevon Montoya MD on 2/4/2022 at 11:14 AM

## 2022-02-04 NOTE — PROGRESS NOTES
[facility]  Cardiology Consult Note        CC:     cardiomyopathy           HPI:   This is a 81 y.o. male has been found to have new onset atrial flutter with RVR. He went to give blood to [facility] which he does every 8 weeks and was told that he was tachycardic and sent to the ER. In the ER the EKG shows heart rate in the 130+ range. The patient has no history of heart failure or heart disease. He certainly has not had any arrhythmias. He is not aware of any shortness of breath palpitations dizziness fatigue. He has history of hypertension hyperlipidemia.     An echocardiogram performed yesterday shows an EF of 25%      Interval history  No shortness of breath palpitations        Past Medical History:   Diagnosis Date    Arthritis     Cancer ([address]     skin-leg    Enlarged prostate     GERD (gastroesophageal reflux disease)     Gout     Hyperlipidemia     Hypertension     Neuropathy     Wears glasses     Wears partial dentures     Wears partial dentures     upper      Past Surgical History:   Procedure Laterality Date    BACK SURGERY      COLONOSCOPY      COLONOSCOPY N/A 3/16/2021    COLONOSCOPY performed by Richie Dhillon MD at [address] Left     ankle    TONSILLECTOMY        Family History   Problem Relation Age of Onset    Cancer Mother     Cancer Father     Cancer Sister       Social History     Tobacco Use    Smoking status: Never Smoker    Smokeless tobacco: Never Used   Vaping Use    Vaping Use: Never used   Substance Use Topics    Alcohol use: Not Currently    Drug use: Never      No Known Allergies   [START ON 2/5/2022] metoprolol succinate  50 mg Oral Daily    amiodarone  200 mg Oral BID    apixaban  5 mg Oral BID    sodium chloride flush  10 mL IntraVENous 2 times per day    pantoprazole  40 mg Oral QAM AC    ferrous sulfate  324 mg Oral TID WC    lisinopril  10 mg Oral Daily       Review of Systems -   Constitutional: Negative for IntraVENous, PRN  potassium chloride (KLOR-CON M) extended release tablet 40 mEq, 40 mEq, Oral, PRN **OR** potassium bicarb-citric acid (EFFER-K) effervescent tablet 40 mEq, 40 mEq, Oral, PRN **OR** potassium chloride 10 mEq/100 mL IVPB (Peripheral Line), 10 mEq, IntraVENous, PRN  potassium chloride 10 mEq/100 mL IVPB (Peripheral Line), 10 mEq, IntraVENous, PRN  magnesium sulfate 2000 mg in 50 mL IVPB premix, 2,000 mg, IntraVENous, PRN  promethazine (PHENERGAN) tablet 12.5 mg, 12.5 mg, Oral, Q6H PRN **OR** ondansetron (ZOFRAN) injection 4 mg, 4 mg, IntraVENous, Q6H PRN  magnesium hydroxide (MILK OF MAGNESIA) 400 MG/5ML suspension 30 mL, 30 mL, Oral, Daily PRN  acetaminophen (TYLENOL) tablet 650 mg, 650 mg, Oral, Q6H PRN **OR** acetaminophen (TYLENOL) suppository 650 mg, 650 mg, Rectal, Q6H PRN  dilTIAZem injection 5 mg, 5 mg, IntraVENous, Q8H PRN  pantoprazole (PROTONIX) tablet 40 mg, 40 mg, Oral, QAM AC  ferrous sulfate EC tablet 324 mg, 324 mg, Oral, TID WC  lisinopril (PRINIVIL;ZESTRIL) tablet 10 mg, 10 mg, Oral, Daily  perflutren lipid microspheres (DEFINITY) injection 1.65 mg, 1.5 mL, IntraVENous, ONCE PRN      Labs:   Recent Labs     02/03/22  0520 02/04/22  0503   WBC 5.5 7.8   HGB 11.7* 12.2*   HCT 35.5* 37.1*    252     Recent Labs     02/03/22  0521 02/03/22  0521 02/04/22  0503   *  --  138   K 4.5  --  4.7   CO2 18*  --  20*   BUN 21*  --  19   CREATININE 1.3  --  1.3   GLUCOSE 113*   < > 102*    < > = values in this interval not displayed. Recent Labs     02/01/22  1359   TROPONINI <0.01     No results found for: HDL, LDLDIRECT, LDLCALC, TRIG  Recent Labs     02/01/22  1359   AST 14*   ALT 9*   LABALBU 4.1         EKG:   Atrial flutter rate of 150    ECHO:  Patient appears to be in atrial fibrillation. EF 20-25% with diffuse  hypokinesis. Normal left ventricular wall thickness and cavity size. The right ventricle appears normal in size with moderately reduced systolic function. Mild mitral and tricuspid regurgitation. ASSESSMENT AND PLAN:        52-year-old patient with no history of coronary artery disease congestive heart failure or heart disease  He was found to have tachycardia when he went to give blood to [facility] blood bank. 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   I will add amiodarone for better rate control since the rate is still fast and attempted EDWARD guided cardioversion tomorrow      1/4/2022  Patient underwent EDWARD guided successful cardioversion  Converted to sinus rhythm  Is on amiodarone 200 twice daily  Can go home tomorrow on amiodarone 200 daily along with Toprol-XL 50 and Eliquis    For his cardiomyopathy he should be on lisinopril 10 mg Aldactone 25 mg  Follow-up with me in 3 to 4 weeks    He may undergo A. fib flutter ablation later      Angela Mast M.D  2/4/2022

## 2022-02-04 NOTE — PROGRESS NOTES
Hospitalist Progress Note      PCP: Amy Niño MD    Date of Admission: 2/1/2022      Subjective: Returned from successful cardioversion. Denies chest pain, palpitation or dizziness. He said that he is feeling good. Cardiology would like to monitor overnight. Medications:  Reviewed    Infusion Medications    dilTIAZem Stopped (02/04/22 0105)    sodium chloride       Scheduled Medications    amiodarone  200 mg Oral BID    metoprolol succinate  50 mg Oral BID    apixaban  5 mg Oral BID    sodium chloride flush  10 mL IntraVENous 2 times per day    pantoprazole  40 mg Oral QAM AC    ferrous sulfate  324 mg Oral TID WC    lisinopril  10 mg Oral Daily     PRN Meds: sodium chloride flush, sodium chloride, potassium chloride **OR** potassium alternative oral replacement **OR** potassium chloride, potassium chloride, magnesium sulfate, promethazine **OR** ondansetron, magnesium hydroxide, acetaminophen **OR** acetaminophen, dilTIAZem, perflutren lipid microspheres      Intake/Output Summary (Last 24 hours) at 2/4/2022 0837  Last data filed at 2/4/2022 9680  Gross per 24 hour   Intake 195.19 ml   Output --   Net 195.19 ml       Physical Exam Performed:    /72   Pulse 83   Temp 98 °F (36.7 °C) (Oral)   Resp 16   Ht 6' 4\" (1.93 m)   Wt 225 lb 8.5 oz (102.3 kg)   SpO2 95%   BMI 27.45 kg/m²     General appearance: No apparent distress  Neck: Supple  Respiratory:  Normal respiratory effort. Clear to auscultation, bilaterally without Rales/Wheezes/Rhonchi. Cardiovascular: Regular rate and rhythm with normal S1/S2 without murmurs, rubs or gallops. Abdomen: Soft, non-tender, non-distended . Musculoskeletal: No clubbing, cyanosis  Skin: Skin color, texture, turgor normal.  No rashes or lesions.   Neurologic:  No focal weakness   Psychiatric: Alert and oriented  Capillary Refill: Brisk,3 seconds, normal   Peripheral Pulses: +2 palpable, equal bilaterally       Labs:   Recent Labs 02/02/22  0513 02/03/22  0520 02/04/22  0503   WBC 4.9 5.5 7.8   HGB 11.5* 11.7* 12.2*   HCT 34.7* 35.5* 37.1*    254 252     Recent Labs     02/02/22  0513 02/03/22  0521 02/04/22  0503    134* 138   K 4.2 4.5 4.7    103 106   CO2 18* 18* 20*   BUN 19 21* 19   CREATININE 1.1 1.3 1.3   CALCIUM 8.9 9.1 9.1     Recent Labs     02/01/22  1359   AST 14*   ALT 9*   BILITOT <0.2   ALKPHOS 122     No results for input(s): INR in the last 72 hours. Recent Labs     02/01/22  1359   TROPONINI <0.01       Urinalysis:    No results found for: [place], BACTERIA, RBCUA, BLOODU, SPECGRAV, GLUCOSEU    Radiology:  No orders to display       Assessment/Plan:    Active Hospital Problems    Diagnosis     Tachycardia [R00.0]      1. Tachyarrhythmia, SVT, patient received Cardizem bolus then Cardizem drip, still on 5 mg an hour telemetry monitoring, cardiology consulted, possible need for ablation, metoprolol added per cardiology, hold lisinopril. 2.  PAFIB s/p cardioversion (2/4/22), added metoprolol, continue eliquis. 3. Essential hypertension, continue p.o. medications  4. GERD, PPI  5. Hyperlipidemia, continue statin  6. Metabolic acidosis,relativly stable. 7.  Anemia, appears chronic, follow-up as outpatient  8. .Cardiomyopathy with HFrEF (EF 20-25%), cardiology following    Diet: ADULT DIET; Regular; Low Fat/Low Chol/High Fiber/MADELYN  Code Status: Full Code    Dispo: likely home tomorrow if cardiology ok.     Keven Mendes MD

## 2022-02-04 NOTE — PROCEDURES
Cardioversion    Indication: AF    Anesthesia: Brevital  20    Single synchronized biphasic cardioversion. Successful    Converted to SR.     Zelalem Abdalla M.D.

## 2022-02-05 LAB
ANION GAP SERPL CALCULATED.3IONS-SCNC: 12 MMOL/L (ref 3–16)
BUN BLDV-MCNC: 26 MG/DL (ref 7–20)
CALCIUM SERPL-MCNC: 8.7 MG/DL (ref 8.3–10.6)
CHLORIDE BLD-SCNC: 103 MMOL/L (ref 99–110)
CO2: 20 MMOL/L (ref 21–32)
CREAT SERPL-MCNC: 1.7 MG/DL (ref 0.8–1.3)
EKG ATRIAL RATE: 60 BPM
EKG DIAGNOSIS: NORMAL
EKG P AXIS: 40 DEGREES
EKG P-R INTERVAL: 228 MS
EKG Q-T INTERVAL: 414 MS
EKG QRS DURATION: 86 MS
EKG QTC CALCULATION (BAZETT): 414 MS
EKG R AXIS: -18 DEGREES
EKG T AXIS: 47 DEGREES
EKG VENTRICULAR RATE: 60 BPM
GFR AFRICAN AMERICAN: 46
GFR NON-AFRICAN AMERICAN: 38
GLUCOSE BLD-MCNC: 99 MG/DL (ref 70–99)
HCT VFR BLD CALC: 33.3 % (ref 40.5–52.5)
HEMOGLOBIN: 10.8 G/DL (ref 13.5–17.5)
MCH RBC QN AUTO: 27.1 PG (ref 26–34)
MCHC RBC AUTO-ENTMCNC: 32.3 G/DL (ref 31–36)
MCV RBC AUTO: 84 FL (ref 80–100)
PDW BLD-RTO: 15.6 % (ref 12.4–15.4)
PLATELET # BLD: 239 K/UL (ref 135–450)
PMV BLD AUTO: 6.7 FL (ref 5–10.5)
POTASSIUM REFLEX MAGNESIUM: 4.7 MMOL/L (ref 3.5–5.1)
RBC # BLD: 3.96 M/UL (ref 4.2–5.9)
SODIUM BLD-SCNC: 135 MMOL/L (ref 136–145)
WBC # BLD: 6.3 K/UL (ref 4–11)

## 2022-02-05 PROCEDURE — 2060000000 HC ICU INTERMEDIATE R&B

## 2022-02-05 PROCEDURE — 85027 COMPLETE CBC AUTOMATED: CPT

## 2022-02-05 PROCEDURE — 2580000003 HC RX 258: Performed by: INTERNAL MEDICINE

## 2022-02-05 PROCEDURE — 99232 SBSQ HOSP IP/OBS MODERATE 35: CPT | Performed by: INTERNAL MEDICINE

## 2022-02-05 PROCEDURE — 36415 COLL VENOUS BLD VENIPUNCTURE: CPT

## 2022-02-05 PROCEDURE — 6370000000 HC RX 637 (ALT 250 FOR IP): Performed by: INTERNAL MEDICINE

## 2022-02-05 PROCEDURE — 6370000000 HC RX 637 (ALT 250 FOR IP): Performed by: NURSE PRACTITIONER

## 2022-02-05 PROCEDURE — 80048 BASIC METABOLIC PNL TOTAL CA: CPT

## 2022-02-05 RX ADMIN — APIXABAN 5 MG: 5 TABLET, FILM COATED ORAL at 20:13

## 2022-02-05 RX ADMIN — FERROUS SULFATE TAB EC 324 MG (65 MG FE EQUIVALENT) 324 MG: 324 (65 FE) TABLET DELAYED RESPONSE at 16:25

## 2022-02-05 RX ADMIN — SODIUM CHLORIDE, PRESERVATIVE FREE 10 ML: 5 INJECTION INTRAVENOUS at 20:13

## 2022-02-05 RX ADMIN — SPIRONOLACTONE 25 MG: 25 TABLET ORAL at 08:10

## 2022-02-05 RX ADMIN — SODIUM CHLORIDE, PRESERVATIVE FREE 10 ML: 5 INJECTION INTRAVENOUS at 08:16

## 2022-02-05 RX ADMIN — AMIODARONE HYDROCHLORIDE 200 MG: 200 TABLET ORAL at 20:13

## 2022-02-05 RX ADMIN — FERROUS SULFATE TAB EC 324 MG (65 MG FE EQUIVALENT) 324 MG: 324 (65 FE) TABLET DELAYED RESPONSE at 11:49

## 2022-02-05 RX ADMIN — FERROUS SULFATE TAB EC 324 MG (65 MG FE EQUIVALENT) 324 MG: 324 (65 FE) TABLET DELAYED RESPONSE at 08:10

## 2022-02-05 RX ADMIN — PANTOPRAZOLE SODIUM 40 MG: 40 TABLET, DELAYED RELEASE ORAL at 06:05

## 2022-02-05 RX ADMIN — METOPROLOL SUCCINATE 50 MG: 50 TABLET, EXTENDED RELEASE ORAL at 08:10

## 2022-02-05 RX ADMIN — LISINOPRIL 10 MG: 10 TABLET ORAL at 08:11

## 2022-02-05 RX ADMIN — APIXABAN 5 MG: 5 TABLET, FILM COATED ORAL at 08:11

## 2022-02-05 RX ADMIN — AMIODARONE HYDROCHLORIDE 200 MG: 200 TABLET ORAL at 08:10

## 2022-02-05 ASSESSMENT — PAIN SCALES - GENERAL
PAINLEVEL_OUTOF10: 0

## 2022-02-05 NOTE — PROGRESS NOTES
Clinical Pharmacy Note  Medication Counseling    Reviewed new medications started during hospital admission: eliquis , . Indications and side effects were emphasized during counseling. All medication-related questions addressed. Patient verbalized understanding of education. Should the patient express any additional questions or concerns regarding their medications, please do not hesitate to contact the pharmacy department. Patient/caregiver aware they may refuse medications during hospital stay. 10 minutes spent educating patient regarding medications.

## 2022-02-05 NOTE — PROGRESS NOTES
Progress Note    Admit Date: 2/1/2022         Subjective and Overnight Events: Pt being followed up for new onset CHF and Afib   NSR HR controlled 60s  No chest pain  No palpitations  No sob       Objective:   Vitals: /78   Pulse 60   Temp 98.3 °F (36.8 °C) (Oral)   Resp 16   Ht 6' 4\" (1.93 m)   Wt 223 lb 12.3 oz (101.5 kg)   SpO2 98%   BMI 27.24 kg/m²   /78   Pulse 60   Temp 98.3 °F (36.8 °C) (Oral)   Resp 16   Ht 6' 4\" (1.93 m)   Wt 223 lb 12.3 oz (101.5 kg)   SpO2 98%   BMI 27.24 kg/m²     General Appearance:    Alert, cooperative, no distress, appears stated age   Head:    Normocephalic, without obvious abnormality, atraumatic   Eyes:    PERRL, conjunctiva/corneas clear       Ears:    Normal TM's and external ear canals, both ears   Nose:   Nares normal, septum midline, mucosa normal   Throat:   Lips, mucosa, and tongue normal; teeth and gums normal           Lungs:     Clear to auscultation bilaterally, respirations unlabored       Heart:    Regular rate and rhythm, S1 and S2 normal, no murmur, rub    or gallop   Abdomen:     Soft, non-tender, bowel sounds active all four quadrants,     no masses, no organomegaly           Extremities:   Extremities normal, atraumatic, no cyanosis or edema   Pulses:   2+ and symmetric all extremities   Skin:   Skin color, texture, turgor normal, no rashes or lesions       Neurologic:   CNII-XII intact.  Normal strength, sensation and reflexes       throughout     Data:     Scheduled Medications:    metoprolol succinate  50 mg Oral Daily    spironolactone  25 mg Oral Daily    amiodarone  200 mg Oral BID    apixaban  5 mg Oral BID    sodium chloride flush  10 mL IntraVENous 2 times per day    pantoprazole  40 mg Oral QAM AC    ferrous sulfate  324 mg Oral TID WC    lisinopril  10 mg Oral Daily      PRN Medications: sodium chloride flush, sodium chloride, potassium chloride **OR** potassium alternative oral replacement **OR** potassium chloride, potassium chloride, magnesium sulfate, promethazine **OR** ondansetron, magnesium hydroxide, acetaminophen **OR** acetaminophen, dilTIAZem, perflutren lipid microspheres  Diet: ADULT DIET;  Regular; Low Fat/Low Chol/High Fiber/MADELYN    Continuous Infusions:   sodium chloride           Intake/Output Summary (Last 24 hours) at 2/5/2022 1508  Last data filed at 2/5/2022 1449  Gross per 24 hour   Intake 960 ml   Output --   Net 960 ml       CBC:   Recent Labs     02/04/22  0503 02/05/22  0501   WBC 7.8 6.3   HGB 12.2* 10.8*    239     BMP:  Recent Labs     02/04/22  0503 02/05/22  0501    135*   K 4.7 4.7    103   CO2 20* 20*   BUN 19 26*   CREATININE 1.3 1.7*   GLUCOSE 102* 99     ABGs: No results found for: PHART, PO2ART, [id number]    Assessment/plan     Patient Active Problem List:     Tachycardia      - new onset AFib    S/p CV 02/04/21    Doing well on amiodarone   Started Eliquis 5 mg BID  - Cardiomyopathy LEVF 25%   troprol XL 50 daily  Aldactone 25 mg   ACEi 10   - FERNANDO on CKD 3  Creat up to 1.7 from 1.3   Will follow   - chronic anemia   - HLD         Prior    Sanam Conway MD

## 2022-02-05 NOTE — PROGRESS NOTES
Patient had urine stains on sheet and pants. When offered clean linen, pt refused and stated, \"I'm a man, and will ask for help when I want it. \" Clean linen was placed in room and near patient for when he is ready.  Electronically signed by Shreyas Posada RN on 2/4/2022 at 9:09 PM

## 2022-02-05 NOTE — PROGRESS NOTES
Cardiology Progress Note     Admit Date: 2022     Reason for follow up: Afib s/p CV 2022    HPI and Interval History: This is a 81 y.o. male has been found to have new onset atrial flutter with RVR. He went to give blood to [facility] which he does every 8 weeks and was told that he was tachycardic and sent to the ER. In the ER the EKG shows heart rate in the 130+ range. Patient seen and examined. Clinical notes reviewed. Telemetry reviewed - SR 60's. No new complaint today. No major events overnight. Denies having angina, shortness of breath, dyspnea on exertion, Orthopnea, PND at the time of this visit. Review of System:  All other systems reviewed except for that noted above. Pertinent negatives and positives are:     · General: negative for fever, chills   · Ophthalmic ROS: negative for - eye pain or loss of vision  · ENT ROS: negative for - headaches, sore throat   · Respiratory: negative for - cough, sputum  · Cardiovascular: Reviewed in HPI  · Gastrointestinal: negative for - abdominal pain, diarrhea, N/V  · Hematology: negative for - bleeding, blood clots, bruising or jaundice  · Genito-Urinary:  negative for - Dysuria or incontinence  · Musculoskeletal: negative for - Joint swelling, muscle pain  · Neurological: negative for - confusion, dizziness, headaches   · Psychiatric: No anxiety, no depression.   · Dermatological: negative for - rash      Physical Examination:  Vitals:    22 1145   BP: 115/78   Pulse: 60   Resp: 16   Temp: 98.3 °F (36.8 °C)   SpO2: 98%        Intake/Output Summary (Last 24 hours) at 2022 1242  Last data filed at 2022 1118  Gross per 24 hour   Intake 480 ml   Output --   Net 480 ml     In: 480 [P.O.:480]  Out: -    Wt Readings from Last 3 Encounters:   22 223 lb 12.3 oz (101.5 kg)   21 215 lb (97.5 kg)     Temp  Av.2 °F (36.8 °C)  Min: 97.6 °F (36.4 °C)  Max: 98.8 °F (37.1 °C)  Pulse  Av.1  Min: 58  Max: 65  BP  Min: 92/55  Max: 115/78  SpO2  Av.8 %  Min: 94 %  Max: 98 %    · Telemetry: Sinus rhythm with v-rates 60's bpm  · Constitutional: Alert. Oriented to person, place, and time. No distress. · Head: Normocephalic and atraumatic. · Mouth/Throat: Lips appear moist. Oropharynx is clear and moist.  · Eyes: Conjunctivae normal. EOM are normal.   · Neck: Neck supple. No lymphadenopathy. No rigidity. No JVD present. · Cardiovascular: Normal rate, regular rhythm. Normal S1&S2. Carotid pulse 2+ bilaterally. · Pulmonary/Chest: Bilateral respiratory sounds present. No respiratory accessory muscle use. No wheezes, No rhonchi. No bibasilar rales. · Abdominal: Soft. Normal bowel sounds present. No distension, No tenderness. No splenomegaly. No hernia. · Musculoskeletal: No tenderness. Full range of motion in bilateral upper and lower extremities. No pitting BLE edema    · Lymphadenopathy: Has no cervical adenopathy. · Neurological: Alert and oriented. Cranial nerve II-XII grossly intact, No gross deficit to touch. · Skin: Skin is warm and dry. No rash, lesions, ulcerations noted. · Psychiatric: No anxiety nor agitation. Labs, telemetry, diagnostic and imaging results personally reviewed and interpreted. Recent Labs     22  0521 22  0503 22  0501   * 138 135*   K 4.5 4.7 4.7    106 103   CO2 18* 20* 20*   BUN 21* 19 26*   CREATININE 1.3 1.3 1.7*     Recent Labs     22  0520 22  0503 22  0501   WBC 5.5 7.8 6.3   HGB 11.7* 12.2* 10.8*   HCT 35.5* 37.1* 33.3*   MCV 83.6 83.7 84.0    252 239     Lab Results   Component Value Date    TROPONINI <0.01 2022     Estimated Creatinine Clearance: 39 mL/min (A) (based on SCr of 1.7 mg/dL (H)).    No results found for: BNP  No results found for: PROTIME, INR  No results found for: CHOL, HDL, TRIG    Scheduled Meds:   metoprolol succinate  50 mg Oral Daily    spironolactone  25 mg Oral Daily    amiodarone  200 mg Oral BID    apixaban  5 mg Oral BID    sodium chloride flush  10 mL IntraVENous 2 times per day    pantoprazole  40 mg Oral QAM AC    ferrous sulfate  324 mg Oral TID WC    lisinopril  10 mg Oral Daily     Continuous Infusions:   sodium chloride       PRN Meds:sodium chloride flush, sodium chloride, potassium chloride **OR** potassium alternative oral replacement **OR** potassium chloride, potassium chloride, magnesium sulfate, promethazine **OR** ondansetron, magnesium hydroxide, acetaminophen **OR** acetaminophen, dilTIAZem, perflutren lipid microspheres     Patient Active Problem List    Diagnosis Date Noted    Tachycardia 02/01/2022      Active Hospital Problems    Diagnosis Date Noted    Tachycardia [R00.0] 02/01/2022       Assessment and Plan:     New-onset atrial fibrillation  -s/p CV 2/4/2022  -currently on amidoarone 200mg po BID for rhythm control  -[id number] score of at least 3 and warrants [address] for which he is on Eliquis 5mg po BID    New-found cardiomyopathy with LVEF 25%  - cont Toprol XL 50mg daily, spironolactone 25mg daily, lisinopril 10mg daily (just started recently)  -Cr 1.7 from 1.3. Need to monitor closely. May be due to the lisinopril and spironolactone addition. If stable, will not need to curtail medications. If renal function stable, may be discharged home with close follow-up with both Dr. Esthela Simons [facility]) and Dr. Jae Cotto (EP). Thank you for allowing me to participate in the care of this patient. If you have any questions, please do not hesitate to contact me.     Kumar Bartlett MD, MS, [facility]  Cardiac Electrophysiology  [address]  [address] [place], [address] [facility]  [place] [address]  [phone]

## 2022-02-05 NOTE — PROGRESS NOTES
Patient is resting comfortably in bed. No complaints of pain or discomfort. Pt is determined to go home in the morning.  Electronically signed by Evelia Rios RN on 2/4/2022 at 11:58 PM

## 2022-02-05 NOTE — PLAN OF CARE
Problem: Falls - Risk of:  Goal: Will remain free from falls  Description: Will remain free from falls  Outcome: Ongoing  Goal: Absence of physical injury  Description: Absence of physical injury  Outcome: Ongoing     Problem:  Activity:  Goal: Ability to tolerate increased activity will improve  Description: Ability to tolerate increased activity will improve  Outcome: Ongoing  Goal: Expression of feelings of increased energy will increase  Description: Expression of feelings of increased energy will increase  Outcome: Ongoing     Problem: Cardiac:  Goal: Ability to maintain an adequate cardiac output will improve  Description: Ability to maintain an adequate cardiac output will improve  Outcome: Ongoing

## 2022-02-06 LAB
ANION GAP SERPL CALCULATED.3IONS-SCNC: 13 MMOL/L (ref 3–16)
BUN BLDV-MCNC: 25 MG/DL (ref 7–20)
CALCIUM SERPL-MCNC: 8.9 MG/DL (ref 8.3–10.6)
CHLORIDE BLD-SCNC: 102 MMOL/L (ref 99–110)
CO2: 20 MMOL/L (ref 21–32)
CREAT SERPL-MCNC: 1.4 MG/DL (ref 0.8–1.3)
GFR AFRICAN AMERICAN: 58
GFR NON-AFRICAN AMERICAN: 48
GLUCOSE BLD-MCNC: 110 MG/DL (ref 70–99)
HCT VFR BLD CALC: 34.4 % (ref 40.5–52.5)
HEMOGLOBIN: 11.1 G/DL (ref 13.5–17.5)
MCH RBC QN AUTO: 27.1 PG (ref 26–34)
MCHC RBC AUTO-ENTMCNC: 32.3 G/DL (ref 31–36)
MCV RBC AUTO: 83.8 FL (ref 80–100)
PDW BLD-RTO: 15.9 % (ref 12.4–15.4)
PLATELET # BLD: 227 K/UL (ref 135–450)
PMV BLD AUTO: 6.9 FL (ref 5–10.5)
POTASSIUM REFLEX MAGNESIUM: 4.7 MMOL/L (ref 3.5–5.1)
RBC # BLD: 4.1 M/UL (ref 4.2–5.9)
SODIUM BLD-SCNC: 135 MMOL/L (ref 136–145)
WBC # BLD: 5.6 K/UL (ref 4–11)

## 2022-02-06 PROCEDURE — 6370000000 HC RX 637 (ALT 250 FOR IP): Performed by: INTERNAL MEDICINE

## 2022-02-06 PROCEDURE — 6370000000 HC RX 637 (ALT 250 FOR IP): Performed by: NURSE PRACTITIONER

## 2022-02-06 PROCEDURE — 80048 BASIC METABOLIC PNL TOTAL CA: CPT

## 2022-02-06 PROCEDURE — 2580000003 HC RX 258: Performed by: INTERNAL MEDICINE

## 2022-02-06 PROCEDURE — 99232 SBSQ HOSP IP/OBS MODERATE 35: CPT | Performed by: INTERNAL MEDICINE

## 2022-02-06 PROCEDURE — 85027 COMPLETE CBC AUTOMATED: CPT

## 2022-02-06 PROCEDURE — 36415 COLL VENOUS BLD VENIPUNCTURE: CPT

## 2022-02-06 PROCEDURE — 2060000000 HC ICU INTERMEDIATE R&B

## 2022-02-06 RX ADMIN — FERROUS SULFATE TAB EC 324 MG (65 MG FE EQUIVALENT) 324 MG: 324 (65 FE) TABLET DELAYED RESPONSE at 08:35

## 2022-02-06 RX ADMIN — SODIUM CHLORIDE, PRESERVATIVE FREE 10 ML: 5 INJECTION INTRAVENOUS at 08:35

## 2022-02-06 RX ADMIN — SPIRONOLACTONE 25 MG: 25 TABLET ORAL at 08:35

## 2022-02-06 RX ADMIN — METOPROLOL SUCCINATE 50 MG: 50 TABLET, EXTENDED RELEASE ORAL at 08:35

## 2022-02-06 RX ADMIN — FERROUS SULFATE TAB EC 324 MG (65 MG FE EQUIVALENT) 324 MG: 324 (65 FE) TABLET DELAYED RESPONSE at 18:21

## 2022-02-06 RX ADMIN — LISINOPRIL 10 MG: 10 TABLET ORAL at 08:35

## 2022-02-06 RX ADMIN — AMIODARONE HYDROCHLORIDE 200 MG: 200 TABLET ORAL at 20:55

## 2022-02-06 RX ADMIN — SODIUM CHLORIDE, PRESERVATIVE FREE 10 ML: 5 INJECTION INTRAVENOUS at 20:55

## 2022-02-06 RX ADMIN — APIXABAN 5 MG: 5 TABLET, FILM COATED ORAL at 20:55

## 2022-02-06 RX ADMIN — AMIODARONE HYDROCHLORIDE 200 MG: 200 TABLET ORAL at 08:35

## 2022-02-06 RX ADMIN — APIXABAN 5 MG: 5 TABLET, FILM COATED ORAL at 08:35

## 2022-02-06 RX ADMIN — PANTOPRAZOLE SODIUM 40 MG: 40 TABLET, DELAYED RELEASE ORAL at 06:13

## 2022-02-06 ASSESSMENT — PAIN SCALES - GENERAL
PAINLEVEL_OUTOF10: 0

## 2022-02-06 NOTE — PROGRESS NOTES
Progress Note    Admit Date: 2/1/2022         Subjective and Overnight Events: Pt being followed up for new onset CHF and Afib   NSR HR controlled 60s  No chest pain  No palpitations  No sob   Renal function better today       Objective:   Vitals: /79   Pulse 85   Temp 97.8 °F (36.6 °C) (Oral)   Resp 16   Ht 6' 4\" (1.93 m)   Wt 225 lb 5 oz (102.2 kg)   SpO2 97%   BMI 27.43 kg/m²   /79   Pulse 85   Temp 97.8 °F (36.6 °C) (Oral)   Resp 16   Ht 6' 4\" (1.93 m)   Wt 225 lb 5 oz (102.2 kg)   SpO2 97%   BMI 27.43 kg/m²     General Appearance:    Alert, cooperative, no distress, appears stated age   Head:    Normocephalic, without obvious abnormality, atraumatic   Eyes:    PERRL, conjunctiva/corneas clear       Ears:    Normal TM's and external ear canals, both ears   Nose:   Nares normal, septum midline, mucosa normal   Throat:   Lips, mucosa, and tongue normal; teeth and gums normal           Lungs:     Clear to auscultation bilaterally, respirations unlabored       Heart:    Regular rate and rhythm, S1 and S2 normal, no murmur, rub    or gallop   Abdomen:     Soft, non-tender, bowel sounds active all four quadrants,     no masses, no organomegaly           Extremities:   Extremities normal, atraumatic, no cyanosis or edema   Pulses:   2+ and symmetric all extremities   Skin:   Skin color, texture, turgor normal, no rashes or lesions       Neurologic:   CNII-XII intact.  Normal strength, sensation and reflexes       throughout     Data:     Scheduled Medications:    metoprolol succinate  50 mg Oral Daily    spironolactone  25 mg Oral Daily    amiodarone  200 mg Oral BID    apixaban  5 mg Oral BID    sodium chloride flush  10 mL IntraVENous 2 times per day    pantoprazole  40 mg Oral QAM AC    ferrous sulfate  324 mg Oral TID WC    lisinopril  10 mg Oral Daily      PRN Medications: sodium chloride flush, sodium chloride, potassium chloride **OR** potassium alternative oral replacement **OR** potassium chloride, potassium chloride, magnesium sulfate, promethazine **OR** ondansetron, magnesium hydroxide, acetaminophen **OR** acetaminophen, dilTIAZem, perflutren lipid microspheres  Diet: ADULT DIET;  Regular; Low Fat/Low Chol/High Fiber/MADELYN    Continuous Infusions:   sodium chloride           Intake/Output Summary (Last 24 hours) at 2/6/2022 1027  Last data filed at 2/5/2022 2011  Gross per 24 hour   Intake 1140 ml   Output --   Net 1140 ml       CBC:   Recent Labs     02/05/22  0501 02/06/22  0515   WBC 6.3 5.6   HGB 10.8* 11.1*    227     BMP:  Recent Labs     02/05/22  0501 02/06/22  0516   * 135*   K 4.7 4.7    102   CO2 20* 20*   BUN 26* 25*   CREATININE 1.7* 1.4*   GLUCOSE 99 110*     ABGs: No results found for: PHART, PO2ART, [id number]    Assessment/plan     Patient Active Problem List:     Tachycardia      - new onset AFib    S/p CV 02/04/21    Doing well on amiodarone   Started Eliquis 5 mg BID  - Cardiomyopathy LEVF 25%   troprol XL 50 daily  Aldactone 25 mg   ACEi 10   - FERNANDO on CKD 3  Creat up to 1.7 -now at 1.4 from 1.3   - chronic anemia   - HLD     Plan  Spoke with RN who will speak to SW about giving the patient 30 d supply of eliquis or Xarelto - he is concerned about the cost later on and might want to go on warfarin if not able to afford     Prior    Magen Johns MD

## 2022-02-06 NOTE — PROGRESS NOTES
Cardiology Progress Note     Admit Date: 2022     Reason for follow up: Afib s/p CV 2022, CHF    HPI and Interval History: This is a 81 y. o. male has been found to have new onset atrial flutter with RVR.  He went to give blood to [facility] which he does every 8 weeks and was told that he was tachycardic and sent to the ER.  In the ER the EKG shows heart rate in the 130+ range. Patient seen and examined 2022. Clinical notes reviewed. Telemetry reviewed - SR 60's. No new complaint today. No major events overnight. Denies having angina, shortness of breath, dyspnea on exertion, Orthopnea, PND at the time of this visit. Review of System:  All other systems reviewed except for that noted above. Pertinent negatives and positives are:     · General: negative for fever, chills   · Ophthalmic ROS: negative for - eye pain or loss of vision  · ENT ROS: negative for - headaches, sore throat   · Respiratory: negative for - cough, sputum  · Cardiovascular: Reviewed in HPI  · Gastrointestinal: negative for - abdominal pain, diarrhea, N/V  · Hematology: negative for - bleeding, blood clots, bruising or jaundice  · Genito-Urinary:  negative for - Dysuria or incontinence  · Musculoskeletal: negative for - Joint swelling, muscle pain  · Neurological: negative for - confusion, dizziness, headaches   · Psychiatric: No anxiety, no depression.   · Dermatological: negative for - rash      Physical Examination:  Vitals:    22 0835   BP: 119/79   Pulse: 85   Resp: 16   Temp: 97.8 °F (36.6 °C)   SpO2: 97%        Intake/Output Summary (Last 24 hours) at 2022 1050  Last data filed at 2022  Gross per 24 hour   Intake 1140 ml   Output --   Net 1140 ml     In: 1260 [P.O.:1260]  Out: -    Wt Readings from Last 3 Encounters:   22 225 lb 5 oz (102.2 kg)   21 215 lb (97.5 kg)     Temp  Av.8 °F (36.6 °C)  Min: 97.5 °F (36.4 °C)  Max: 98.3 °F (36.8 °C)  Pulse  Av.5  Min: 60  Max: 85  BP  Min: 115/78  Max: 127/67  SpO2  Av.2 %  Min: 96 %  Max: 99 %    · Telemetry: Sinus rhythm with v-rates 60's bpm  · Constitutional: Alert. Oriented to person, place, and time. No distress. · Head: Normocephalic and atraumatic. · Mouth/Throat: Lips appear moist. Oropharynx is clear and moist.  · Eyes: Conjunctivae normal. EOM are normal.   · Neck: Neck supple. No lymphadenopathy. No rigidity. No JVD present. · Cardiovascular: Normal rate, regular rhythm. Normal S1&S2. Carotid pulse 2+ bilaterally. · Pulmonary/Chest: Bilateral respiratory sounds present. No respiratory accessory muscle use. No wheezes, No rhonchi. No bibasilar rales. · Abdominal: Soft. Normal bowel sounds present. No distension, No tenderness. No splenomegaly. No hernia. · Musculoskeletal: No tenderness. Full range of motion in bilateral upper and lower extremities. No pitting BLE edema    · Lymphadenopathy: Has no cervical adenopathy. · Neurological: Alert and oriented. Cranial nerve II-XII grossly intact, No gross deficit to touch. · Skin: Skin is warm and dry. No rash, lesions, ulcerations noted. · Psychiatric: No anxiety nor agitation. Labs, telemetry, diagnostic and imaging results personally reviewed and interpreted. Recent Labs     22  0503 22  0501 22  0516    135* 135*   K 4.7 4.7 4.7    103 102   CO2 20* 20* 20*   BUN 19 26* 25*   CREATININE 1.3 1.7* 1.4*     Recent Labs     22  0503 22  0501 22  0515   WBC 7.8 6.3 5.6   HGB 12.2* 10.8* 11.1*   HCT 37.1* 33.3* 34.4*   MCV 83.7 84.0 83.8    239 227     Lab Results   Component Value Date    TROPONINI <0.01 2022     Estimated Creatinine Clearance: 47 mL/min (A) (based on SCr of 1.4 mg/dL (H)).    No results found for: BNP  No results found for: PROTIME, INR  No results found for: CHOL, HDL, TRIG    Scheduled Meds:   metoprolol succinate  50 mg Oral Daily    spironolactone  25 mg Oral Daily    amiodarone  200 mg Oral BID    apixaban  5 mg Oral BID    sodium chloride flush  10 mL IntraVENous 2 times per day    pantoprazole  40 mg Oral QAM AC    ferrous sulfate  324 mg Oral TID WC    lisinopril  10 mg Oral Daily     Continuous Infusions:   sodium chloride       PRN Meds:sodium chloride flush, sodium chloride, potassium chloride **OR** potassium alternative oral replacement **OR** potassium chloride, potassium chloride, magnesium sulfate, promethazine **OR** ondansetron, magnesium hydroxide, acetaminophen **OR** acetaminophen, dilTIAZem, perflutren lipid microspheres     Patient Active Problem List    Diagnosis Date Noted    Tachycardia 02/01/2022      Active Hospital Problems    Diagnosis Date Noted    Tachycardia [R00.0] 02/01/2022       Assessment and Plan:     New-onset atrial fibrillation  -s/p CV 2/4/2022  -currently on amiodarone 200mg BID for rhythm control  -[id number] score of at least 3 and warrants [address] for which he is on Eliquis 5mg po BID    New-found cardiomyopathy with LVEF 25%  -cont Toprol XL 50mg daily, spironolactone 25mg daily, lisinopril 10m daily  -Cr now downtrending to 1.4 from 1.7 yestrday. Will sign off. The patient should follow general cardiology (for cardiomyopathy) and EP (Dr. Elissa Pedroza) for atrial fibrillation. Thank you for allowing me to participate in the care of this patient. If you have any questions, please do not hesitate to contact me.     Nathan Aguilera MD, MS, [facility], [facility]  Cardiac Electrophysiology  [address]  [address] [facility], [address] [place] [address]  [address] [facility] [address]  [phone]

## 2022-02-06 NOTE — PLAN OF CARE
parameters  Description: Urine creatinine clearance will be within specified parameters  Outcome: Ongoing     Problem: Tissue Perfusion - Renal, Altered:  Goal: Ability to achieve a balanced intake and output will improve  Description: Ability to achieve a balanced intake and output will improve  Outcome: Ongoing

## 2022-02-07 VITALS
TEMPERATURE: 98.8 F | SYSTOLIC BLOOD PRESSURE: 136 MMHG | RESPIRATION RATE: 16 BRPM | HEIGHT: 76 IN | WEIGHT: 223.11 LBS | BODY MASS INDEX: 27.17 KG/M2 | DIASTOLIC BLOOD PRESSURE: 86 MMHG | HEART RATE: 61 BPM | OXYGEN SATURATION: 97 %

## 2022-02-07 LAB
ANION GAP SERPL CALCULATED.3IONS-SCNC: 12 MMOL/L (ref 3–16)
BUN BLDV-MCNC: 23 MG/DL (ref 7–20)
CALCIUM SERPL-MCNC: 8.8 MG/DL (ref 8.3–10.6)
CHLORIDE BLD-SCNC: 104 MMOL/L (ref 99–110)
CO2: 21 MMOL/L (ref 21–32)
CREAT SERPL-MCNC: 1.3 MG/DL (ref 0.8–1.3)
GFR AFRICAN AMERICAN: >60
GFR NON-AFRICAN AMERICAN: 52
GLUCOSE BLD-MCNC: 110 MG/DL (ref 70–99)
HCT VFR BLD CALC: 34 % (ref 40.5–52.5)
HEMOGLOBIN: 11 G/DL (ref 13.5–17.5)
MCH RBC QN AUTO: 27.1 PG (ref 26–34)
MCHC RBC AUTO-ENTMCNC: 32.2 G/DL (ref 31–36)
MCV RBC AUTO: 84.1 FL (ref 80–100)
PDW BLD-RTO: 16.1 % (ref 12.4–15.4)
PLATELET # BLD: 269 K/UL (ref 135–450)
PMV BLD AUTO: 7.1 FL (ref 5–10.5)
POTASSIUM REFLEX MAGNESIUM: 4.6 MMOL/L (ref 3.5–5.1)
RBC # BLD: 4.05 M/UL (ref 4.2–5.9)
SODIUM BLD-SCNC: 137 MMOL/L (ref 136–145)
WBC # BLD: 5.5 K/UL (ref 4–11)

## 2022-02-07 PROCEDURE — 94761 N-INVAS EAR/PLS OXIMETRY MLT: CPT

## 2022-02-07 PROCEDURE — 85027 COMPLETE CBC AUTOMATED: CPT

## 2022-02-07 PROCEDURE — 6370000000 HC RX 637 (ALT 250 FOR IP): Performed by: NURSE PRACTITIONER

## 2022-02-07 PROCEDURE — 6370000000 HC RX 637 (ALT 250 FOR IP): Performed by: INTERNAL MEDICINE

## 2022-02-07 PROCEDURE — 36415 COLL VENOUS BLD VENIPUNCTURE: CPT

## 2022-02-07 PROCEDURE — 2580000003 HC RX 258: Performed by: INTERNAL MEDICINE

## 2022-02-07 PROCEDURE — 80048 BASIC METABOLIC PNL TOTAL CA: CPT

## 2022-02-07 RX ORDER — AMIODARONE HYDROCHLORIDE 200 MG/1
200 TABLET ORAL 2 TIMES DAILY
Qty: 60 TABLET | Refills: 0 | Status: SHIPPED | OUTPATIENT
Start: 2022-02-07 | End: 2022-02-18

## 2022-02-07 RX ORDER — SPIRONOLACTONE 25 MG/1
25 TABLET ORAL DAILY
Qty: 30 TABLET | Refills: 3 | Status: SHIPPED | OUTPATIENT
Start: 2022-02-08 | End: 2022-03-11 | Stop reason: ALTCHOICE

## 2022-02-07 RX ORDER — METOPROLOL SUCCINATE 50 MG/1
50 TABLET, EXTENDED RELEASE ORAL DAILY
Qty: 30 TABLET | Refills: 0 | Status: SHIPPED | OUTPATIENT
Start: 2022-02-08 | End: 2022-03-11

## 2022-02-07 RX ADMIN — AMIODARONE HYDROCHLORIDE 200 MG: 200 TABLET ORAL at 09:11

## 2022-02-07 RX ADMIN — FERROUS SULFATE TAB EC 324 MG (65 MG FE EQUIVALENT) 324 MG: 324 (65 FE) TABLET DELAYED RESPONSE at 11:03

## 2022-02-07 RX ADMIN — METOPROLOL SUCCINATE 50 MG: 50 TABLET, EXTENDED RELEASE ORAL at 09:11

## 2022-02-07 RX ADMIN — LISINOPRIL 10 MG: 10 TABLET ORAL at 09:12

## 2022-02-07 RX ADMIN — SODIUM CHLORIDE, PRESERVATIVE FREE 10 ML: 5 INJECTION INTRAVENOUS at 09:12

## 2022-02-07 RX ADMIN — APIXABAN 5 MG: 5 TABLET, FILM COATED ORAL at 09:11

## 2022-02-07 RX ADMIN — FERROUS SULFATE TAB EC 324 MG (65 MG FE EQUIVALENT) 324 MG: 324 (65 FE) TABLET DELAYED RESPONSE at 09:12

## 2022-02-07 RX ADMIN — SPIRONOLACTONE 25 MG: 25 TABLET ORAL at 09:11

## 2022-02-07 ASSESSMENT — PAIN SCALES - GENERAL
PAINLEVEL_OUTOF10: 0
PAINLEVEL_OUTOF10: 0

## 2022-02-07 NOTE — PROGRESS NOTES
Discharge orders acknowledged by RN . Discharge teaching completed with pt and family. AVS reviewed and all questions answered. Medication regimen reviewed and pt understands schedule. Follow up appointments also reviewed with pt and resources given for discharge. Pt was sent electronic to be filled and understands schedule. IV removed. [facility] 60+ minutes of education completed. Required core measures completed. Pt vitals WDL. Pt discharged with all belongings to home with daughter. Pt transported off of unit via wheelchair. No complications.          Electronically signed by Kera Subramanian RN on 2/7/2022 at 1:44 PM

## 2022-02-07 NOTE — DISCHARGE SUMMARY
Hospital Medicine Discharge Summary    Patient ID: Anika Martins      Patient's PCP: Phoebe DRAKE    Admit Date: 2/1/2022     Discharge Date: 2/7/2022 02/07/22    Admitting Physician: Archie Carroll MD     Discharge Physician: Niya Perez - TA     Discharge Diagnoses: Active Hospital Problems    Diagnosis     Tachycardia [R00.0]        The patient was seen and examined on day of discharge and this discharge summary is in conjunction with any daily progress note from day of discharge. Hospital Course:     Patient is a 76-year-old male with past medical history of hypertension hyperlipidemia who presents to the hospital due to palpitations, patient mentions he was at his physician's office to donate blood. Patient mentions his heart rate was checked and it was in the 130s so he was sent to the hospital for further evaluation. Patient denies chest pain nausea vomiting diarrhea constipation dysuria at time of my evaluation. New onset atrial fibrillation s/p CV on 02/04/2022  - On amiodarone BID, toprol XL   - [id number] Score for Atrial Fibrillation Stroke Risk   Risk   Factors  Component Value   C CHF No 0   H HTN No 0   A2 Age >= 76 Yes,  (81 y.o.) 2   D DM No 0   S2 Prior Stroke/TIA No 0   V Vascular Disease No 0   A Age 74-69 No,  (81 y.o.) 0   Sc Sex male 0    [id number]  Score  2   Score last updated 2/7/22 44:66 AM EST    Click here for a link to the UpToDate guideline \"Atrial Fibrillation: Anticoagulation therapy to prevent embolization    Disclaimer: Risk Score calculation is dependent on accuracy of patient problem list and past encounter diagnosis. - On eliquis BID, will dc with this    New-found cardiomyopathy with LVEF 25%  - Continue bb, spironolactone, and lisinopril daily - will dc with prescriptions for these    FERNANDO on CKD3  - Improved    GERD - w/out active signs/sxs of dysphagia/odynophagia. No evidence of active PUD or hx of GI bleed.  Controlled on home PPI - continue            Physical Exam Performed:     /86   Pulse 61   Temp 98.8 °F (37.1 °C)   Resp 16   Ht 6' 4\" (1.93 m)   Wt 223 lb 1.7 oz (101.2 kg)   SpO2 97%   BMI 27.16 kg/m²       General appearance:  No apparent distress, appears stated age and cooperative. HEENT:  Normal cephalic, atraumatic without obvious deformity. Pupils equal, round, and reactive to light. Extra ocular muscles intact. Conjunctivae/corneas clear. Neck: Supple, with full range of motion. No jugular venous distention. Trachea midline. Respiratory:  Normal respiratory effort. Clear to auscultation, bilaterally without Rales/Wheezes/Rhonchi. Cardiovascular:  Regular rate and rhythm with normal S1/S2 without murmurs, rubs or gallops. Abdomen: Soft, non-tender, non-distended with normal bowel sounds. Musculoskeletal:  No clubbing, cyanosis or edema bilaterally. Full range of motion without deformity. Skin: Skin color, texture, turgor normal.  No rashes or lesions. Neurologic:  Neurovascularly intact without any focal sensory/motor deficits. Cranial nerves: II-XII intact, grossly non-focal.  Psychiatric:  Alert and oriented, thought content appropriate, normal insight  Capillary Refill: Brisk,< 3 seconds   Peripheral Pulses: +2 palpable, equal bilaterally       Labs:  For convenience and continuity at follow-up the following most recent labs are provided:      CBC:    Lab Results   Component Value Date    WBC 5.5 02/07/2022    HGB 11.0 02/07/2022    HCT 34.0 02/07/2022     02/07/2022       Renal:    Lab Results   Component Value Date     02/07/2022    K 4.6 02/07/2022     02/07/2022    CO2 21 02/07/2022    BUN 23 02/07/2022    CREATININE 1.3 02/07/2022    CALCIUM 8.8 02/07/2022         Significant Diagnostic Studies    Radiology:   No orders to display          Consults:     IP CONSULT TO CARDIOLOGY  IP CONSULT TO HOSPITALIST    Disposition:  Home     Condition at Discharge: Stable    Discharge

## 2022-02-07 NOTE — CARE COORDINATION
CASE MANAGEMENT DISCHARGE SUMMARY:    DISCHARGE DATE: 2/7/2022     DISCHARGED TO: Home alone; independent; patient reports no needs. Met with patient at bedside. Provided IM. Confirmed family transport.      TRANSPORTATION: Family              TIME: 1:30 PM     PREFERRED PHARMACY: [address], [place], Social Work/Case Management   [phone]  Electronically signed by JAME Hernández, LSW on 2/7/2022 at 1:06 PM

## 2022-02-07 NOTE — PLAN OF CARE
Problem: Falls - Risk of:  Goal: Will remain free from falls  Description: Will remain free from falls  Outcome: Ongoing  Goal: Absence of physical injury  Description: Absence of physical injury  Outcome: Ongoing     Problem:  Activity:  Goal: Ability to tolerate increased activity will improve  Description: Ability to tolerate increased activity will improve  Outcome: Ongoing  Goal: Expression of feelings of increased energy will increase  Description: Expression of feelings of increased energy will increase  Outcome: Ongoing     Problem: Cardiac:  Goal: Ability to maintain an adequate cardiac output will improve  Description: Ability to maintain an adequate cardiac output will improve  Outcome: Ongoing  Goal: Complications related to the disease process, condition or treatment will be avoided or minimized  Description: Complications related to the disease process, condition or treatment will be avoided or minimized  Outcome: Ongoing     Problem: Coping:  Goal: Level of anxiety will decrease  Description: Level of anxiety will decrease  Outcome: Ongoing  Goal: General experience of comfort will improve  Description: General experience of comfort will improve  Outcome: Ongoing     Problem: Health Behavior:  Goal: Ability to manage health-related needs will improve  Description: Ability to manage health-related needs will improve  Outcome: Ongoing     Problem: Safety:  Goal: Ability to remain free from injury will improve  Description: Ability to remain free from injury will improve  Outcome: Ongoing  Goal: Will show no signs and symptoms of excessive bleeding  Description: Will show no signs and symptoms of excessive bleeding  Outcome: Ongoing     Problem: Tissue Perfusion - Renal, Altered:  Goal: Electrolytes within specified parameters  Description: Electrolytes within specified parameters  Outcome: Ongoing  Goal: Urine creatinine clearance will be within specified parameters  Description: Urine creatinine clearance will be within specified parameters  Outcome: Ongoing  Goal: Serum creatinine will be within specified parameters  Description: Serum creatinine will be within specified parameters  Outcome: Ongoing  Goal: Ability to achieve a balanced intake and output will improve  Description: Ability to achieve a balanced intake and output will improve  Outcome: Ongoing

## 2022-02-09 ENCOUNTER — APPOINTMENT (OUTPATIENT)
Dept: ULTRASOUND IMAGING | Age: 87
DRG: 813 | End: 2022-02-09
Payer: MEDICARE

## 2022-02-09 ENCOUNTER — HOSPITAL ENCOUNTER (INPATIENT)
Age: 87
LOS: 2 days | Discharge: HOME | DRG: 813 | End: 2022-02-11
Attending: EMERGENCY MEDICINE | Admitting: INTERNAL MEDICINE
Payer: MEDICARE

## 2022-02-09 DIAGNOSIS — N28.89 RENAL MASS: ICD-10-CM

## 2022-02-09 DIAGNOSIS — Z79.01 ANTICOAGULATED: ICD-10-CM

## 2022-02-09 DIAGNOSIS — R31.9 HEMATURIA, UNSPECIFIED TYPE: Primary | ICD-10-CM

## 2022-02-09 LAB
A/G RATIO: 1 (ref 1.1–2.2)
ALBUMIN SERPL-MCNC: 3.8 G/DL (ref 3.4–5)
ALP BLD-CCNC: 115 U/L (ref 40–129)
ALT SERPL-CCNC: 18 U/L (ref 10–40)
ANION GAP SERPL CALCULATED.3IONS-SCNC: 13 MMOL/L (ref 3–16)
ANTI-XA LOV PROPHYLAXIS: 1.67 IU/ML (ref 0.3–0.6)
AST SERPL-CCNC: 16 U/L (ref 15–37)
BASOPHILS ABSOLUTE: 0.1 K/UL (ref 0–0.2)
BASOPHILS RELATIVE PERCENT: 0.9 %
BILIRUB SERPL-MCNC: 0.6 MG/DL (ref 0–1)
BILIRUBIN URINE: NEGATIVE
BLOOD, URINE: ABNORMAL
BUN BLDV-MCNC: 18 MG/DL (ref 7–20)
CALCIUM SERPL-MCNC: 9.3 MG/DL (ref 8.3–10.6)
CHLORIDE BLD-SCNC: 100 MMOL/L (ref 99–110)
CLARITY: ABNORMAL
CO2: 19 MMOL/L (ref 21–32)
COLOR: ABNORMAL
CREAT SERPL-MCNC: 1.3 MG/DL (ref 0.8–1.3)
EKG ATRIAL RATE: 62 BPM
EKG DIAGNOSIS: NORMAL
EKG P AXIS: 58 DEGREES
EKG P-R INTERVAL: 208 MS
EKG Q-T INTERVAL: 412 MS
EKG QRS DURATION: 86 MS
EKG QTC CALCULATION (BAZETT): 418 MS
EKG R AXIS: -29 DEGREES
EKG T AXIS: 59 DEGREES
EKG VENTRICULAR RATE: 62 BPM
EOSINOPHILS ABSOLUTE: 0.1 K/UL (ref 0–0.6)
EOSINOPHILS RELATIVE PERCENT: 1.4 %
EPITHELIAL CELLS, UA: 1 /HPF (ref 0–5)
GFR AFRICAN AMERICAN: >60
GFR NON-AFRICAN AMERICAN: 52
GLUCOSE BLD-MCNC: 124 MG/DL (ref 70–99)
GLUCOSE URINE: NEGATIVE MG/DL
HCT VFR BLD CALC: 39.8 % (ref 40.5–52.5)
HEMOGLOBIN: 13.1 G/DL (ref 13.5–17.5)
HYALINE CASTS: 1 /LPF (ref 0–8)
KETONES, URINE: NEGATIVE MG/DL
LEUKOCYTE ESTERASE, URINE: ABNORMAL
LYMPHOCYTES ABSOLUTE: 1.4 K/UL (ref 1–5.1)
LYMPHOCYTES RELATIVE PERCENT: 22.1 %
MCH RBC QN AUTO: 27.8 PG (ref 26–34)
MCHC RBC AUTO-ENTMCNC: 33 G/DL (ref 31–36)
MCV RBC AUTO: 84.2 FL (ref 80–100)
MICROSCOPIC EXAMINATION: YES
MONOCYTES ABSOLUTE: 0.8 K/UL (ref 0–1.3)
MONOCYTES RELATIVE PERCENT: 13 %
NEUTROPHILS ABSOLUTE: 3.9 K/UL (ref 1.7–7.7)
NEUTROPHILS RELATIVE PERCENT: 62.6 %
NITRITE, URINE: NEGATIVE
PDW BLD-RTO: 16.3 % (ref 12.4–15.4)
PH UA: 6 (ref 5–8)
PLATELET # BLD: 315 K/UL (ref 135–450)
PMV BLD AUTO: 6.9 FL (ref 5–10.5)
POTASSIUM SERPL-SCNC: 4.9 MMOL/L (ref 3.5–5.1)
PRO-BNP: 565 PG/ML (ref 0–449)
PROTEIN UA: 30 MG/DL
RBC # BLD: 4.72 M/UL (ref 4.2–5.9)
RBC UA: >900 /HPF (ref 0–4)
SODIUM BLD-SCNC: 132 MMOL/L (ref 136–145)
SPECIFIC GRAVITY UA: 1.01 (ref 1–1.03)
TOTAL PROTEIN: 7.7 G/DL (ref 6.4–8.2)
TROPONIN: <0.01 NG/ML
URINE REFLEX TO CULTURE: ABNORMAL
URINE TYPE: ABNORMAL
UROBILINOGEN, URINE: 1 E.U./DL
WBC # BLD: 6.2 K/UL (ref 4–11)
WBC UA: 3 /HPF (ref 0–5)

## 2022-02-09 PROCEDURE — 1200000000 HC SEMI PRIVATE

## 2022-02-09 PROCEDURE — 80053 COMPREHEN METABOLIC PANEL: CPT

## 2022-02-09 PROCEDURE — 83880 ASSAY OF NATRIURETIC PEPTIDE: CPT

## 2022-02-09 PROCEDURE — 93005 ELECTROCARDIOGRAM TRACING: CPT | Performed by: EMERGENCY MEDICINE

## 2022-02-09 PROCEDURE — 85520 HEPARIN ASSAY: CPT

## 2022-02-09 PROCEDURE — 2580000003 HC RX 258: Performed by: INTERNAL MEDICINE

## 2022-02-09 PROCEDURE — 85025 COMPLETE CBC W/AUTO DIFF WBC: CPT

## 2022-02-09 PROCEDURE — 76770 US EXAM ABDO BACK WALL COMP: CPT

## 2022-02-09 PROCEDURE — 93010 ELECTROCARDIOGRAM REPORT: CPT | Performed by: INTERNAL MEDICINE

## 2022-02-09 PROCEDURE — 81001 URINALYSIS AUTO W/SCOPE: CPT

## 2022-02-09 PROCEDURE — 99284 EMERGENCY DEPT VISIT MOD MDM: CPT

## 2022-02-09 PROCEDURE — 6370000000 HC RX 637 (ALT 250 FOR IP): Performed by: INTERNAL MEDICINE

## 2022-02-09 PROCEDURE — 84484 ASSAY OF TROPONIN QUANT: CPT

## 2022-02-09 RX ORDER — ACETAMINOPHEN 650 MG/1
650 SUPPOSITORY RECTAL EVERY 6 HOURS PRN
Status: DISCONTINUED | OUTPATIENT
Start: 2022-02-09 | End: 2022-02-11 | Stop reason: HOSPADM

## 2022-02-09 RX ORDER — PANTOPRAZOLE SODIUM 40 MG/1
40 TABLET, DELAYED RELEASE ORAL
Status: DISCONTINUED | OUTPATIENT
Start: 2022-02-10 | End: 2022-02-11 | Stop reason: HOSPADM

## 2022-02-09 RX ORDER — POTASSIUM CHLORIDE 7.45 MG/ML
10 INJECTION INTRAVENOUS PRN
Status: DISCONTINUED | OUTPATIENT
Start: 2022-02-09 | End: 2022-02-09 | Stop reason: SDUPTHER

## 2022-02-09 RX ORDER — FERROUS SULFATE TAB EC 324 MG (65 MG FE EQUIVALENT) 324 (65 FE) MG
324 TABLET DELAYED RESPONSE ORAL
Status: DISCONTINUED | OUTPATIENT
Start: 2022-02-09 | End: 2022-02-11 | Stop reason: HOSPADM

## 2022-02-09 RX ORDER — SODIUM CHLORIDE 0.9 % (FLUSH) 0.9 %
10 SYRINGE (ML) INJECTION PRN
Status: DISCONTINUED | OUTPATIENT
Start: 2022-02-09 | End: 2022-02-11 | Stop reason: HOSPADM

## 2022-02-09 RX ORDER — POTASSIUM CHLORIDE 7.45 MG/ML
10 INJECTION INTRAVENOUS PRN
Status: DISCONTINUED | OUTPATIENT
Start: 2022-02-09 | End: 2022-02-11 | Stop reason: HOSPADM

## 2022-02-09 RX ORDER — LISINOPRIL 10 MG/1
10 TABLET ORAL DAILY
Status: DISCONTINUED | OUTPATIENT
Start: 2022-02-10 | End: 2022-02-11 | Stop reason: HOSPADM

## 2022-02-09 RX ORDER — OMEPRAZOLE 10 MG/1
10 CAPSULE, DELAYED RELEASE ORAL DAILY PRN
COMMUNITY
End: 2022-02-18

## 2022-02-09 RX ORDER — ATORVASTATIN CALCIUM 10 MG/1
10 TABLET, FILM COATED ORAL DAILY
COMMUNITY

## 2022-02-09 RX ORDER — MAGNESIUM SULFATE IN WATER 40 MG/ML
2000 INJECTION, SOLUTION INTRAVENOUS PRN
Status: DISCONTINUED | OUTPATIENT
Start: 2022-02-09 | End: 2022-02-11 | Stop reason: HOSPADM

## 2022-02-09 RX ORDER — ONDANSETRON 2 MG/ML
4 INJECTION INTRAMUSCULAR; INTRAVENOUS EVERY 6 HOURS PRN
Status: DISCONTINUED | OUTPATIENT
Start: 2022-02-09 | End: 2022-02-11 | Stop reason: HOSPADM

## 2022-02-09 RX ORDER — POTASSIUM CHLORIDE 20 MEQ/1
40 TABLET, EXTENDED RELEASE ORAL PRN
Status: DISCONTINUED | OUTPATIENT
Start: 2022-02-09 | End: 2022-02-11 | Stop reason: HOSPADM

## 2022-02-09 RX ORDER — SODIUM CHLORIDE 9 MG/ML
25 INJECTION, SOLUTION INTRAVENOUS PRN
Status: DISCONTINUED | OUTPATIENT
Start: 2022-02-09 | End: 2022-02-11 | Stop reason: HOSPADM

## 2022-02-09 RX ORDER — PROMETHAZINE HYDROCHLORIDE 25 MG/1
12.5 TABLET ORAL EVERY 6 HOURS PRN
Status: DISCONTINUED | OUTPATIENT
Start: 2022-02-09 | End: 2022-02-11 | Stop reason: HOSPADM

## 2022-02-09 RX ORDER — SODIUM CHLORIDE 0.9 % (FLUSH) 0.9 %
10 SYRINGE (ML) INJECTION EVERY 12 HOURS SCHEDULED
Status: DISCONTINUED | OUTPATIENT
Start: 2022-02-09 | End: 2022-02-11 | Stop reason: HOSPADM

## 2022-02-09 RX ORDER — AMIODARONE HYDROCHLORIDE 200 MG/1
200 TABLET ORAL 2 TIMES DAILY
Status: DISCONTINUED | OUTPATIENT
Start: 2022-02-09 | End: 2022-02-11 | Stop reason: HOSPADM

## 2022-02-09 RX ORDER — METOPROLOL SUCCINATE 50 MG/1
50 TABLET, EXTENDED RELEASE ORAL DAILY
Status: DISCONTINUED | OUTPATIENT
Start: 2022-02-10 | End: 2022-02-11 | Stop reason: HOSPADM

## 2022-02-09 RX ORDER — ACETAMINOPHEN 325 MG/1
650 TABLET ORAL EVERY 6 HOURS PRN
Status: DISCONTINUED | OUTPATIENT
Start: 2022-02-09 | End: 2022-02-11 | Stop reason: HOSPADM

## 2022-02-09 RX ORDER — ATORVASTATIN CALCIUM 10 MG/1
10 TABLET, FILM COATED ORAL DAILY
Status: DISCONTINUED | OUTPATIENT
Start: 2022-02-10 | End: 2022-02-11 | Stop reason: HOSPADM

## 2022-02-09 RX ORDER — SPIRONOLACTONE 25 MG/1
25 TABLET ORAL DAILY
Status: DISCONTINUED | OUTPATIENT
Start: 2022-02-10 | End: 2022-02-11 | Stop reason: HOSPADM

## 2022-02-09 RX ADMIN — SODIUM CHLORIDE, PRESERVATIVE FREE 10 ML: 5 INJECTION INTRAVENOUS at 20:33

## 2022-02-09 RX ADMIN — AMIODARONE HYDROCHLORIDE 200 MG: 200 TABLET ORAL at 20:30

## 2022-02-09 RX ADMIN — FERROUS SULFATE TAB EC 324 MG (65 MG FE EQUIVALENT) 324 MG: 324 (65 FE) TABLET DELAYED RESPONSE at 20:30

## 2022-02-09 ASSESSMENT — PAIN SCALES - GENERAL
PAINLEVEL_OUTOF10: 0

## 2022-02-09 NOTE — ED NOTES
Pharmacy Medication Reconciliation Note     List of medications patient is currently taking is complete. Source of information:   1. EMR  2. patient    Notes regarding home medications:   1. Just d/c from hospital  2. Reports taking all new prescriptions he received this morning  3. Reports alfuzosin is not effective so he does no take it.  Also reports taking OTC PPI given cost of nexium prescription    Denies taking any other OTC or herbal medications    Huma Oro PharmD, BCPS  2/9/2022  5:46 PM

## 2022-02-09 NOTE — ED TRIAGE NOTES
Pt arrives for eval of hematuria after starting blood thinner for new onset afib. Pt denies all other symptoms. Pt is a/ox4, resp nonlabored and pwd.

## 2022-02-09 NOTE — ED NOTES
Report called Asa Asif. All questions answered. Transport placed.       Ashley Colindres RN  02/09/22 [id number]

## 2022-02-09 NOTE — ED NOTES
Pts niece updated at this time. Per pt he also has updated niece about everything. After speaking with pt, pt sts is able to relay what information he would like her to have.      Travis Rosado RN  02/09/22 [address], RN  02/09/22 [id number]

## 2022-02-09 NOTE — ED NOTES
Bed: B-32  Expected date:   Expected time:   Means of arrival:   Comments:  51A     Radha Ocampo RN  02/09/22 [id number]

## 2022-02-09 NOTE — ED PROVIDER NOTES
[facility]      CHIEF COMPLAINT  Hematuria (pt states he was dx with afib and dc yesterday. first dose of blood thinner yesterday per report. pt noticed blood in urine. )       HISTORY OF PRESENT ILLNESS  Rupinder Gottlieb is a 81 y.o. male 3 admission to the hospital for atrial fibrillation started on Eliquis and discharged home on Eliquis who presents to the emergency department for evaluation of pink urine. Patient reports he noticed that pink urine and he did some gurgling and saw that blood thinners can sometimes cause bleeding in the urine. Says he called his PCP and was told to come to the emergency department for further work-up. Denies having pain anywhere. No chest pain, abdominal pain. No dysuria, urinary urgency, or frequency. Denies having history of kidney stones. No history of hematuria. No other complaints, modifying factors or associated symptoms. I have reviewed the following from the nursing documentation.     Past Medical History:   Diagnosis Date    Arthritis     Cancer ([address]     skin-leg    Enlarged prostate     GERD (gastroesophageal reflux disease)     Gout     Hyperlipidemia     Hypertension     Neuropathy     Wears glasses     Wears partial dentures     Wears partial dentures     upper     Past Surgical History:   Procedure Laterality Date    BACK SURGERY      COLONOSCOPY      COLONOSCOPY N/A 3/16/2021    COLONOSCOPY performed by Ferdinand Duverney, MD at [address] Left     ankle    TONSILLECTOMY       Family History   Problem Relation Age of Onset    Cancer Mother     Cancer Father     Cancer Sister      Social History     Socioeconomic History    Marital status: Single     Spouse name: Not on file    Number of children: Not on file    Years of education: Not on file    Highest education level: Not on file   Occupational History    Not on file   Tobacco Use    Smoking status: Never Smoker    Smokeless tobacco: Never Used Vaping Use    Vaping Use: Never used   Substance and Sexual Activity    Alcohol use: Not Currently    Drug use: Never    Sexual activity: Not on file   Other Topics Concern    Not on file   Social History Narrative    Not on file     Social Determinants of Health     Financial Resource Strain:     Difficulty of Paying Living Expenses: Not on file   Food Insecurity:     Worried About Running Out of Food in the Last Year: Not on file    Miesha of Food in the Last Year: Not on file   Transportation Needs:     Lack of Transportation (Medical): Not on file    Lack of Transportation (Non-Medical):  Not on file   Physical Activity:     Days of Exercise per Week: Not on file    Minutes of Exercise per Session: Not on file   Stress:     Feeling of Stress : Not on file   Social Connections:     Frequency of Communication with Friends and Family: Not on file    Frequency of Social Gatherings with Friends and Family: Not on file    Attends Synagogue Services: Not on file    Active Member of [address] [place] [address] or Organizations: Not on file    Attends Club or Organization Meetings: Not on file    Marital Status: Not on file   Intimate Partner Violence:     Fear of Current or Ex-Partner: Not on file    Emotionally Abused: Not on file    Physically Abused: Not on file    Sexually Abused: Not on file   Housing Stability:     Unable to Pay for Housing in the Last Year: Not on file    Number of [place] in the Last Year: Not on file    Unstable Housing in the Last Year: Not on file     Current Facility-Administered Medications   Medication Dose Route Frequency Provider Last Rate Last Admin    sodium chloride flush 0.9 % injection 10 mL  10 mL IntraVENous 2 times per day Ethan Pillai MD   10 mL at 02/09/22 2033    sodium chloride flush 0.9 % injection 10 mL  10 mL IntraVENous PRN Ethan Pillai MD        0.9 % sodium chloride infusion  25 mL IntraVENous PRIRINA Pillai MD        potassium chloride (KLOR-CON M) extended release tablet 40 mEq  40 mEq Oral PRN Pérez Garcia MD        Or    potassium bicarb-citric acid (EFFER-K) effervescent tablet 40 mEq  40 mEq Oral PRN Pérez Garcia MD        Or    potassium chloride 10 mEq/100 mL IVPB (Peripheral Line)  10 mEq IntraVENous PRN Pérez Garcia MD        magnesium sulfate 2000 mg in 50 mL IVPB premix  2,000 mg IntraVENous PRN Pérez Garcia MD        promethazine (PHENERGAN) tablet 12.5 mg  12.5 mg Oral Q6H PRN Pérez Garcia MD        Or    ondansetron (ZOFRAN) injection 4 mg  4 mg IntraVENous Q6H PRN Pérez Garcia MD        magnesium hydroxide (MILK OF MAGNESIA) 400 MG/5ML suspension 30 mL  30 mL Oral Daily PRN Pérez Garcia MD        acetaminophen (TYLENOL) tablet 650 mg  650 mg Oral Q6H PRN éPrez Garcia MD        Or    acetaminophen (TYLENOL) suppository 650 mg  650 mg Rectal Q6H PRN Pérez Garcia MD        amiodarone (CORDARONE) tablet 200 mg  200 mg Oral BID Pérez Garcia MD   200 mg at 02/09/22 2030    [START ON 2/10/2022] atorvastatin (LIPITOR) tablet 10 mg  10 mg Oral Daily Pérez Garcia MD        ferrous sulfate EC tablet 324 mg  324 mg Oral TID WC Pérez Garcia MD   324 mg at 02/09/22 2030    [START ON 2/10/2022] lisinopril (PRINIVIL;ZESTRIL) tablet 10 mg  10 mg Oral Daily Pérez Garcia MD        [START ON 2/10/2022] metoprolol succinate (TOPROL XL) extended release tablet 50 mg  50 mg Oral Daily Pérez Garcia MD        [START ON 2/10/2022] pantoprazole (PROTONIX) tablet 40 mg  40 mg Oral QAM AC Pérez Garcia MD        [START ON 2/10/2022] spironolactone (ALDACTONE) tablet 25 mg  25 mg Oral Daily Pérez Garcia MD         No Known Allergies    REVIEW OF SYSTEMS  10 systems reviewed, pertinent positives per HPI otherwise noted to be negative. PHYSICAL EXAM  /72   Pulse 60   Temp 98.3 °F (36.8 °C) (Oral)   Resp 20   Ht 6' 4\" (1.93 m)   Wt 223 lb 5.2 oz (101.3 kg)   SpO2 97%   BMI 27.18 kg/m²    GENERAL APPEARANCE: Awake and alert.   HENT: Normocephalic. Atraumatic. PERRL. EOMI. No facial droop. HEART/CHEST: RRR. LUNGS: Respirations unlabored. Speaking comfortably in full sentences. ABDOMEN: Soft, non-distended abdomen. Non tender to palpation. No guarding. No rebound. EXTREMITIES: No gross deformities. Moving all extremities. SKIN: Warm and dry. No acute rashes. NEUROLOGICAL: Alert and oriented. No gross facial drooping. Answering questions appropriately. Moving all extremities. PSYCHIATRIC: Pleasant. Normal mood and affect.     LABS  Results for orders placed or performed during the hospital encounter of 02/09/22   Comprehensive Metabolic Panel   Result Value Ref Range    Sodium 132 (L) 136 - 145 mmol/L    Potassium 4.9 3.5 - 5.1 mmol/L    Chloride 100 99 - 110 mmol/L    CO2 19 (L) 21 - 32 mmol/L    Anion Gap 13 3 - 16    Glucose 124 (H) 70 - 99 mg/dL    BUN 18 7 - 20 mg/dL    CREATININE 1.3 0.8 - 1.3 mg/dL    GFR Non-African American 52 (A) >60    GFR African American >60 >60    Calcium 9.3 8.3 - 10.6 mg/dL    Total Protein 7.7 6.4 - 8.2 g/dL    Albumin 3.8 3.4 - 5.0 g/dL    Albumin/Globulin Ratio 1.0 (L) 1.1 - 2.2    Total Bilirubin 0.6 0.0 - 1.0 mg/dL    Alkaline Phosphatase 115 40 - 129 U/L    ALT 18 10 - 40 U/L    AST 16 15 - 37 U/L   CBC Auto Differential   Result Value Ref Range    WBC 6.2 4.0 - 11.0 K/uL    RBC 4.72 4.20 - 5.90 M/uL    Hemoglobin 13.1 (L) 13.5 - 17.5 g/dL    Hematocrit 39.8 (L) 40.5 - 52.5 %    MCV 84.2 80.0 - 100.0 fL    MCH 27.8 26.0 - 34.0 pg    MCHC 33.0 31.0 - 36.0 g/dL    RDW 16.3 (H) 12.4 - 15.4 %    Platelets 944 158 - 642 K/uL    MPV 6.9 5.0 - 10.5 fL    Neutrophils % 62.6 %    Lymphocytes % 22.1 %    Monocytes % 13.0 %    Eosinophils % 1.4 %    Basophils % 0.9 %    Neutrophils Absolute 3.9 1.7 - 7.7 K/uL    Lymphocytes Absolute 1.4 1.0 - 5.1 K/uL    Monocytes Absolute 0.8 0.0 - 1.3 K/uL    Eosinophils Absolute 0.1 0.0 - 0.6 K/uL    Basophils Absolute 0.1 0.0 - 0.2 K/uL   Anti Factor Xa Enoxaparin Prophylaxis Dose   Result Value Ref Range    Anti-XA LOV Prophylaxis 1.67 (HH) 0.30 - 0.60 IU/mL   Troponin   Result Value Ref Range    Troponin <0.01 <0.01 ng/mL   Urinalysis Reflex to Culture    Specimen: Urine, clean catch   Result Value Ref Range    Color, UA KIMBERLY (A) Straw/Yellow    Clarity, UA CLOUDY (A) Clear    Glucose, Ur Negative Negative mg/dL    Bilirubin Urine Negative Negative    Ketones, Urine Negative Negative mg/dL    Specific Gravity, UA 1.014 1.005 - 1.030    Blood, Urine LARGE (A) Negative    pH, UA 6.0 5.0 - 8.0    Protein, UA 30 (A) Negative mg/dL    Urobilinogen, Urine 1.0 <2.0 E.U./dL    Nitrite, Urine Negative Negative    Leukocyte Esterase, Urine TRACE (A) Negative    Microscopic Examination YES     Urine Type NotGiven     Urine Reflex to Culture Not Indicated    Brain Natriuretic Peptide   Result Value Ref Range    Pro- (H) 0 - 449 pg/mL   Microscopic Urinalysis   Result Value Ref Range    Hyaline Casts, UA 1 0 - 8 /LPF    WBC, UA 3 0 - 5 /HPF    RBC, UA >900 (H) 0 - 4 /HPF    Epithelial Cells, UA 1 0 - 5 /HPF   EKG 12 Lead   Result Value Ref Range    Ventricular Rate 62 BPM    Atrial Rate 62 BPM    P-R Interval 208 ms    QRS Duration 86 ms    Q-T Interval 412 ms    QTc Calculation (Bazett) 418 ms    P Axis 58 degrees    R Axis -29 degrees    T Axis 59 degrees    Diagnosis       Normal sinus rhythmConfirmed by MARIELLA LEDESMA, Jessa Tilley ([id number]) on 2/9/2022 5:16:40 PM       I have reviewed all labs for this visit. ECG  The Ekg interpreted by me shows  Normal sinus with with a rate of 62  Axis is normal  QTc is normal  Intervals and Durations are unremarkable.       ST Segments: Cannot rule out inferior infarct, age undetermined  No significant change from prior EKG dated February 04, 2022    RADIOLOGY  ECHO Transesophageal    Result Date: 2/4/2022  Transesophageal Echocardiography Report ([facility])  Demographics   Patient Name      Nydia Cuenca    Date of Study     02/04/2022         Gender Male   Patient Number    [id number]         Date of Birth       1935   Visit Number      [id number]          Age                 81 year(s)   Accession Number  [id number]         Room Number         3231   Corporate ID      [id number]            Sonographer         SUBHASH Coleman,RDCS,RVT, Elisa Levy.  Physician         MD                 Physician           Brittany Yung MD  Procedure Type of Study   [facility] procedure:ECHOCARDIOGRAM TRANSESOPHAGEAL. Procedure Date Date: 02/04/2022 Start: 10:27 AM Study Location: [facility] Technical Quality: Adequate visualization Indications:Atrial fibrillation. Additional Indications:Prior to cardioversion. . Patient Status: Routine Height: 76 inches Weight: 229.01 pounds BSA: 2.35 m2 BMI: 27.87 kg/m2 BP: 125/72 mmHg [facility] Performed By: the attending and the sonographer  Type of Anesthesia: Moderate sedation   Conclusions   Summary  No Left atrial or appendage clot. Mild mitral and tricuspid regurgitation. Signature   ------------------------------------------------------------------  Electronically signed by Brittany Yung MD (Interpreting  physician) on 02/04/2022 at 01:15 PM  ------------------------------------------------------------------   Findings   Left Ventricle   Mitral Valve  Mild mitral regurgitation. Left Atrium  No evidence of thrombus within left atrium. There is no evidence of mass or thrombus in the left atrium or appendage. Aortic Valve  Tricuspid aortic valve. No significant stenosis or regurgitation. Aorta  Aorta not well visualized. Right Ventricle  The right ventricle is not well visualized. Tricuspid Valve  Tricuspid valve is structurally normal.  There is mild tricuspid regurgitation. Right Atrium  Right atrium is not well visualized. Pulmonic Valve  The pulmonic valve is not well visualized. Pericardial Effusion  No pericardial effusion. Pleural Effusion  No pleural effusion is noted. Miscellaneous  IVC not visualized. Echo Complete    Result Date: 2/2/2022  Transthoracic Echocardiography Report (TTE)  Demographics   Patient Name        Zhane Villafana   Date of Study       02/02/2022     Gender               Male   Patient Number      [id number]     Date of Birth        1935   Visit Number        [id number]      Age                  81 year(s)   Accession Number    [id number]     Room Number          8436   Corporate ID        [id number]        Sonographer          Fred Kirby Physician  Brittni Chi  [address]                                     Physician            Reta Michael MD  Procedure Type of Study   TTE procedure:ECHOCARDIOGRAM COMPLETE 2D W DOPPLER W COLOR. Procedure Date Date: 02/02/2022 Start: 02:13 PM Study Location: [facility] - Echo Lab Technical Quality: Limited visualization due to lung interface. Indications:Tachycardia. Additional Indications:HTN HLD . Patient Status: Routine Height: 76 inches Weight: 229.01 pounds BSA: 2.35 m2 BMI: 27.87 kg/m2 Rhythm: Atrial fibrillation HR: 85 bpm BP: 114/74 mmHg  Conclusions   Summary  Suboptimal image quality. Patient appears to be in atrial fibrillation. Overall left ventricular systolic function appears severely reduced. Ejection fraction is visually estimated to be 20-25% with diffuse  hypokinesis. Normal left ventricular wall thickness and cavity size. The right ventricle appears normal in size with moderately reduced systolic  function. Mild mitral and tricuspid regurgitation. Signature   ------------------------------------------------------------------  Electronically signed by Yosvany La MD  (Interpreting physician) on 02/02/2022 at 03:29 PM  ------------------------------------------------------------------   Findings   Left Ventricle  Suboptimal image quality. Patient appears to be in atrial fibrillation. Overall left ventricular systolic function appears severely reduced. Ejection fraction is visually estimated to be 20-25% with diffuse  hypokinesis. Normal left ventricular wall thickness and cavity size. Ungraded diastolic dysfunction but with seemingly normal LV filling  pressures (E/E' average 13). Mitral Valve  Calcification of the anterior leaflet of the mitral valve. Mild mitral regurgitation. No evidence of mitral stenosis. Left Atrium  Left atrium is of normal size. Aortic Valve  The aortic valve leaflets are not well visualized. Aorta  The aortic root is normal in size. The ascending aorta is not well visualized. Right Ventricle  The right ventricle appears normal in size with moderately reduced systolic  function. TAPSE= 1.5cm   Tricuspid Valve  Mild tricuspid regurgitation. No evidence of tricuspid stenosis. Tricuspid valve is structurally normal.   Right Atrium  The right atrium is normal in size. Pulmonic Valve  The pulmonic valve is not well visualized. Pericardial Effusion  No pericardial effusion noted. Pleural Effusion  No pleural effusion. Miscellaneous  IVC not well visualized. Unable to estimate pulmonary artery pressure secondary to incomplete TR jet  envelope.   M-Mode/2D Measurements (cm)   LV Diastolic Dimension: 4.63 cm LV Systolic Dimension: 5.98 cm  LV Septum Diastolic: 8.65 cm  LV PW Diastolic: 9.85 cm        AO Root Dimension: 3.6 cm  RV Diastolic Dimension: 5.15 cm                                  LA Area: 21 cm2  LVOT: 2.1 cm                    LA volume/Index: 59.4 ml recommended when feasible. Mild left pyelocaliectasis, uncertain significance. Mild enlargement of prostate gland. ED COURSE/MDM  Patient seen and evaluated. At presentation, patient was awake, alert, afebrile, hemodynamically stable, and satting alignment. Abdomen soft, nondistended, and nontender palpation throughout. No flank tenderness. Differential diagnosis includes kidney stone, renal mass, bladder mass, or others. However, as patient has no symptoms, I have low concern for kidney stones at this time. Hemoglobin stable compared to last admission. The renal ultrasound shows 5.3 cm mass concerning for malignancy. I do think patient warrants further work-up and recommendations on whether or not to discontinue the Eliquis that was recently started at the last admission. Hospitalist consulted for admission for further evaluation and treatment. Admit. Pt was seen during the [place] 19 pandemic. Appropriate PPE worn by ME during patient encounters. Patient was cared for during a time with constrained hospital bed capacity with nationwide stress on resources and staffing.      During the patient's ED course, the patient was given:  Medications   sodium chloride flush 0.9 % injection 10 mL (10 mLs IntraVENous Given 2/9/22 2033)   sodium chloride flush 0.9 % injection 10 mL (has no administration in time range)   0.9 % sodium chloride infusion (has no administration in time range)   potassium chloride (KLOR-CON M) extended release tablet 40 mEq (has no administration in time range)     Or   potassium bicarb-citric acid (EFFER-K) effervescent tablet 40 mEq (has no administration in time range)     Or   potassium chloride 10 mEq/100 mL IVPB (Peripheral Line) (has no administration in time range)   magnesium sulfate 2000 mg in 50 mL IVPB premix (has no administration in time range)   promethazine (PHENERGAN) tablet 12.5 mg (has no administration in time range)     Or   ondansetron (ZOFRAN) injection 4 mg (has no administration in time range)   magnesium hydroxide (MILK OF MAGNESIA) 400 MG/5ML suspension 30 mL (has no administration in time range)   acetaminophen (TYLENOL) tablet 650 mg (has no administration in time range)     Or   acetaminophen (TYLENOL) suppository 650 mg (has no administration in time range)   amiodarone (CORDARONE) tablet 200 mg (200 mg Oral Given 2/9/22 2030)   atorvastatin (LIPITOR) tablet 10 mg (has no administration in time range)   ferrous sulfate EC tablet 324 mg (324 mg Oral Given 2/9/22 2030)   lisinopril (PRINIVIL;ZESTRIL) tablet 10 mg (has no administration in time range)   metoprolol succinate (TOPROL XL) extended release tablet 50 mg (has no administration in time range)   pantoprazole (PROTONIX) tablet 40 mg (has no administration in time range)   spironolactone (ALDACTONE) tablet 25 mg (has no administration in time range)        CLINICAL IMPRESSION  1. Hematuria, unspecified type    2. Renal mass    3. Anticoagulated        Blood pressure 137/72, pulse 60, temperature 98.3 °F (36.8 °C), temperature source Oral, resp. rate 20, height 6' 4\" (1.93 m), weight 223 lb 5.2 oz (101.3 kg), SpO2 97 %. DISPOSITION  Coralie Litten was admitted to the hospital.       Patient was given scripts for the following medications. I counseled patient how to take these medications. Current Discharge Medication List          Follow-up with:  No follow-up provider specified. DISCLAIMER: This chart was created using Dragon dictation software. Efforts were made by me to ensure accuracy, however some errors may be present due to limitations of this technology and occasionally words are not transcribed correctly.        Mehdi Mason MD  02/10/22 1560

## 2022-02-10 ENCOUNTER — APPOINTMENT (OUTPATIENT)
Dept: MRI IMAGING | Age: 87
DRG: 813 | End: 2022-02-10
Payer: MEDICARE

## 2022-02-10 PROBLEM — N28.89 RENAL MASS: Status: ACTIVE | Noted: 2022-02-10

## 2022-02-10 LAB
ANION GAP SERPL CALCULATED.3IONS-SCNC: 12 MMOL/L (ref 3–16)
BUN BLDV-MCNC: 19 MG/DL (ref 7–20)
CALCIUM SERPL-MCNC: 9.8 MG/DL (ref 8.3–10.6)
CHLORIDE BLD-SCNC: 98 MMOL/L (ref 99–110)
CO2: 22 MMOL/L (ref 21–32)
CREAT SERPL-MCNC: 1.2 MG/DL (ref 0.8–1.3)
GFR AFRICAN AMERICAN: >60
GFR NON-AFRICAN AMERICAN: 57
GLUCOSE BLD-MCNC: 107 MG/DL (ref 70–99)
HCT VFR BLD CALC: 37.4 % (ref 40.5–52.5)
HEMOGLOBIN: 12.2 G/DL (ref 13.5–17.5)
MCH RBC QN AUTO: 27.2 PG (ref 26–34)
MCHC RBC AUTO-ENTMCNC: 32.6 G/DL (ref 31–36)
MCV RBC AUTO: 83.5 FL (ref 80–100)
PDW BLD-RTO: 15.8 % (ref 12.4–15.4)
PLATELET # BLD: 282 K/UL (ref 135–450)
PMV BLD AUTO: 6.9 FL (ref 5–10.5)
POTASSIUM REFLEX MAGNESIUM: 4.9 MMOL/L (ref 3.5–5.1)
RBC # BLD: 4.48 M/UL (ref 4.2–5.9)
SODIUM BLD-SCNC: 132 MMOL/L (ref 136–145)
WBC # BLD: 5.6 K/UL (ref 4–11)

## 2022-02-10 PROCEDURE — 72197 MRI PELVIS W/O & W/DYE: CPT

## 2022-02-10 PROCEDURE — 85027 COMPLETE CBC AUTOMATED: CPT

## 2022-02-10 PROCEDURE — 6360000004 HC RX CONTRAST MEDICATION: Performed by: SURGERY

## 2022-02-10 PROCEDURE — 6360000002 HC RX W HCPCS: Performed by: INTERNAL MEDICINE

## 2022-02-10 PROCEDURE — 80048 BASIC METABOLIC PNL TOTAL CA: CPT

## 2022-02-10 PROCEDURE — A9577 INJ MULTIHANCE: HCPCS | Performed by: SURGERY

## 2022-02-10 PROCEDURE — 6370000000 HC RX 637 (ALT 250 FOR IP): Performed by: NURSE PRACTITIONER

## 2022-02-10 PROCEDURE — 36415 COLL VENOUS BLD VENIPUNCTURE: CPT

## 2022-02-10 PROCEDURE — 9990000010 HC NO CHARGE VISIT

## 2022-02-10 PROCEDURE — 94760 N-INVAS EAR/PLS OXIMETRY 1: CPT

## 2022-02-10 PROCEDURE — 2580000003 HC RX 258: Performed by: INTERNAL MEDICINE

## 2022-02-10 PROCEDURE — 1200000000 HC SEMI PRIVATE

## 2022-02-10 PROCEDURE — 6370000000 HC RX 637 (ALT 250 FOR IP): Performed by: INTERNAL MEDICINE

## 2022-02-10 PROCEDURE — 74183 MRI ABD W/O CNTR FLWD CNTR: CPT

## 2022-02-10 RX ORDER — LORAZEPAM 2 MG/ML
1 INJECTION INTRAMUSCULAR
Status: COMPLETED | OUTPATIENT
Start: 2022-02-10 | End: 2022-02-10

## 2022-02-10 RX ORDER — CALCIUM CARBONATE 200(500)MG
500 TABLET,CHEWABLE ORAL 3 TIMES DAILY PRN
Status: DISCONTINUED | OUTPATIENT
Start: 2022-02-10 | End: 2022-02-11 | Stop reason: HOSPADM

## 2022-02-10 RX ADMIN — AMIODARONE HYDROCHLORIDE 200 MG: 200 TABLET ORAL at 19:59

## 2022-02-10 RX ADMIN — ANTACID TABLETS 500 MG: 500 TABLET, CHEWABLE ORAL at 00:15

## 2022-02-10 RX ADMIN — AMIODARONE HYDROCHLORIDE 200 MG: 200 TABLET ORAL at 12:06

## 2022-02-10 RX ADMIN — FERROUS SULFATE TAB EC 324 MG (65 MG FE EQUIVALENT) 324 MG: 324 (65 FE) TABLET DELAYED RESPONSE at 18:06

## 2022-02-10 RX ADMIN — SODIUM CHLORIDE, PRESERVATIVE FREE 10 ML: 5 INJECTION INTRAVENOUS at 08:44

## 2022-02-10 RX ADMIN — PANTOPRAZOLE SODIUM 40 MG: 40 TABLET, DELAYED RELEASE ORAL at 05:03

## 2022-02-10 RX ADMIN — FERROUS SULFATE TAB EC 324 MG (65 MG FE EQUIVALENT) 324 MG: 324 (65 FE) TABLET DELAYED RESPONSE at 12:06

## 2022-02-10 RX ADMIN — ATORVASTATIN CALCIUM 10 MG: 10 TABLET, FILM COATED ORAL at 12:06

## 2022-02-10 RX ADMIN — LISINOPRIL 10 MG: 10 TABLET ORAL at 12:06

## 2022-02-10 RX ADMIN — LORAZEPAM 1 MG: 2 INJECTION INTRAMUSCULAR; INTRAVENOUS at 09:20

## 2022-02-10 RX ADMIN — GADOBENATE DIMEGLUMINE 20 ML: 529 INJECTION, SOLUTION INTRAVENOUS at 11:19

## 2022-02-10 RX ADMIN — SPIRONOLACTONE 25 MG: 25 TABLET ORAL at 12:06

## 2022-02-10 RX ADMIN — SODIUM CHLORIDE, PRESERVATIVE FREE 10 ML: 5 INJECTION INTRAVENOUS at 20:01

## 2022-02-10 RX ADMIN — METOPROLOL SUCCINATE 50 MG: 50 TABLET, EXTENDED RELEASE ORAL at 12:06

## 2022-02-10 ASSESSMENT — PAIN SCALES - GENERAL
PAINLEVEL_OUTOF10: 0
PAINLEVEL_OUTOF10: 0

## 2022-02-10 NOTE — PROGRESS NOTES
Message received back from MD Harrison - order obtained for 1x dose of 1mg Ativan. See eMAR for documentation of medication administration. Patient now agreeable to transport for scan. MRI checklist in chart, ticket to ride signed.

## 2022-02-10 NOTE — PROGRESS NOTES
Physical Therapy    Nery Luo  [id number]  2/10/2022  1400        PT orders received. Patient chart reviewed. Spoke with RN, reports pt up independently in room. Pt resting in room upon arrival. Pt reporting IND with ADL and fxl mobility within room/bathroom. Pt reports fully IND at home and no needs anticipated. 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 Patient educated that he could potentially obtain Rolling walker if he feels his gait is becoming unstable. Patient acknowledges. Recommend home per MD.    Will sign off for PT.     Electronically signed by Lakshmi Rose, [facility] (#[phone])  on 2/10/2022 at 5:29 PM

## 2022-02-10 NOTE — PROGRESS NOTES
Patient denies blood in his urine this shift. Patient UAL, no urinary output witnessed by this RN. Fresh hat and urinal placed in patient's restroom to obtain a sample for this RN to see.

## 2022-02-10 NOTE — PROGRESS NOTES
Patient returned to unit from MRI and was placed back into room 3105. Patient A&Ox4, denies pain. Scheduled morning medications administered as patient no longer needs to be NPO. See eMAR for documentation. Tele box reapplied, CMU alerted. Patient denies physical/emotional needs at this time. Call light, telephone, and bed side table are within reach. Fall precautions in place. Will continue to monitor and assess.

## 2022-02-10 NOTE — PROGRESS NOTES
Maureend Missy NP -     \"Hey - this pt came in for hematuria related to a mass located on his R kidney. I just got a critical lab for him:     Anti-XA LOV prophylaxis - 1.67     Thank you! \"    Electronically signed by Krys Newman RN on 2/9/2022 at 8:48 PM

## 2022-02-10 NOTE — PROGRESS NOTES
Physician Progress Note      Rhea Novak  CSN #:                  [id number]  :                       1935  ADMIT DATE:       2022 1:17 PM  DISCH DATE:  RESPONDING  PROVIDER #:        Tana Garcia TEXT:    Patient admitted with hematuria, noted to have atrial fibrillation. If   possible, please document in progress notes and discharge summary further   specificity regarding the type of atrial fibrillation: The medical record reflects the following:  Risk Factors:  HTN New onset Afib on 22  Clinical Indicators:  per H&P HPI \"new diagnosis of atrial fibrillation   recently started on Eliquis\"  Treatment: Eliquis on hold now and Lopressor daily    Chronic: nonspecific term that could be referring to paroxysmal, persistent,   or permanent  Longstanding persistent: persistent and continuous, lasting > 1 year. Paroxysmal - self-terminating or intermittent; resolves with or without   intervention within 7 days of onset; may recur with various frequency. Persistent - Fails to terminate within 7 days; Often requires meds or   cardioversion to restore to NSR. Permanent - longstanding & persistent; Medication has been ineffective in   restoring NSR &/or cardioversion is contraindicated    Definitions per MS-DRG Training Guide and Quick Reference Guide, [facility] 112 5   Diseases and Disorders of the Circulatory System; 2019; "[employer]. Software content   from the "[employer]?  Advanced CDI Transformation Program  Options provided:  -- Paroxysmal Atrial Fibrillation  -- Longstanding Persistent Atrial Fibrillation  -- Permanent Atrial Fibrillation  -- Persistent Atrial Fibrillation  -- Chronic Atrial Fibrillation, unspecified  -- Other - I will add my own diagnosis  -- Disagree - Not applicable / Not valid  -- Disagree - Clinically unable to determine / Unknown  -- Refer to Clinical Documentation Reviewer    PROVIDER RESPONSE TEXT:    This patient has unspecified chronic atrial fibrillation.     Query created by: Laina Schreiber on 2/10/2022 9:21 AM      Electronically signed by:  Jose Vale 2/10/2022 10:23 AM

## 2022-02-10 NOTE — PLAN OF CARE
Problem: Falls - Risk of:  Goal: Will remain free from falls  Description: Will remain free from falls  2/10/2022 1248 by Genevieve Bender RN  Outcome: Ongoing  Note: Patient remains free from falls during this shift. Fall precautions remain in place. Patient educated on the need to implement call light use prior to ambulation. Will continue to monitor and assess. 2/10/2022 0046 by Gómez Coates RN  Outcome: Ongoing  Note: Fall risk assessment completed. Fall precautions in place. Bed in lowest position, wheels locked, bed/chair exit alarm in place, call light within reach, and non skid footwear on. Walkway free of clutter. Pt alert and oriented and able to make needs known. Pt educated to use call light when needing to get up, and pt utilizes call light to make needs known. Will continue to monitor. Goal: Absence of physical injury  Description: Absence of physical injury  2/10/2022 1248 by Genevieve Bender RN  Outcome: Ongoing  Note: Patient remains free from physical harm during this shift. Will continue to monitor and assess patient. 2/10/2022 0046 by Gómez Coates RN  Outcome: Ongoing     Problem: Skin Integrity:  Goal: Will show no infection signs and symptoms  Description: Will show no infection signs and symptoms  2/10/2022 1248 by Genevieve Bender RN  Outcome: Ongoing  Note: Patient remains free from new signs and symptoms of infection during this shift. Infection prevention measures are in place. Will continue to monitor for alterations in patient condition throughout the shift. 2/10/2022 0046 by Gómez Coates RN  Outcome: Ongoing  Note: Pt assessed for skin break down. Skin was warm and dry to touch. Will continue to monitor and assess. Goal: Absence of new skin breakdown  Description: Absence of new skin breakdown  2/10/2022 1248 by Genevieve Bender RN  Outcome: Ongoing  Note: Patient skin condition and mucus membrane integrity remain unchanged during this shift.  Skin breakdown prevention interventions are in place. Will continue to monitor and assess. 2/10/2022 0046 by Krys Newman RN  Outcome: Ongoing     Problem: Pain:  Goal: Pain level will decrease  Description: Pain level will decrease  2/10/2022 1248 by Deng Oviedo RN  Outcome: Ongoing  Note: Pain managed with pharmacologic and non-pharmacologic interventions during this shift. Will continue to monitor and assess for needs and change in patient condition. 2/10/2022 0046 by Krys Newman RN  Outcome: Ongoing  Note: Assessing and monitoring pt's pain. PRN pain medication being given if ordered. Educating pt on nonpharmacologic interventions for pain control. Will continue to monitor and reassess.

## 2022-02-10 NOTE — PROGRESS NOTES
Occupational Therapy    02/10/22    [facility]  1935  [id number]    OT orders received. Patient chart reviewed. Spoke with RN, reports pt up independently in room. Pt resting in room upon arrival. Pt reporting IND with ADL and fxl mobility within room/bathroom. Pt reports fully IND at home and no needs anticipated. Will sign off for OT.     Electronically signed by DELROY Norris, OTR/L on 2/10/2022 at 3:41 PM

## 2022-02-10 NOTE — PROGRESS NOTES
Checking on patient Q2H for nutrition needs, hygiene needs, comfort measures, mobility, fall risk interventions, and safe environment. All precautions and interventions in place. Educated patient on use of call light and telephone. Patient verbalizes understanding. Call light/telephone in reach.   Electronically signed by Ananth Cuba RN on 2/9/2022 at 7:14 PM

## 2022-02-10 NOTE — CONSULTS
Reason for Consult: Gross hematuria, renal mass    History of Present Illness: Keira Herrera is a 81 y.o. with recent hospitalization with A. fib and RVR. He underwent cardioversion. Work-up revealed cardiomyopathy with an ejection fraction of 25%. He was discharged on Eliquis. He returns with new onset painless gross hematuria. Work-up in the ER with renal ultrasound showed a 5 cm right renal mass. Patient has a prior history of a right renal mass that was treated with cryoablation in 2014 at [facility].  He has had annual CT scans and follow-up demonstrating no residual mass but lesion measuring 4 to 5 cm in the upper pole right kidney consistent with cryoablation treatment effect.         Past Medical History:   Past Medical History:   Diagnosis Date    Arthritis     Cancer ([address]     skin-leg    Enlarged prostate     GERD (gastroesophageal reflux disease)     Gout     Hyperlipidemia     Hypertension     Neuropathy     Wears glasses     Wears partial dentures     Wears partial dentures     upper       Past Surgical History:  Past Surgical History:   Procedure Laterality Date    BACK SURGERY      COLONOSCOPY      COLONOSCOPY N/A 3/16/2021    COLONOSCOPY performed by Shad Grant MD at [address] Left     ankle    TONSILLECTOMY         Social History:  Social History     Socioeconomic History    Marital status: Single     Spouse name: Not on file    Number of children: Not on file    Years of education: Not on file    Highest education level: Not on file   Occupational History    Not on file   Tobacco Use    Smoking status: Never Smoker    Smokeless tobacco: Never Used   Vaping Use    Vaping Use: Never used   Substance and Sexual Activity    Alcohol use: Not Currently    Drug use: Never    Sexual activity: Not on file   Other Topics Concern    Not on file   Social History Narrative    Not on file     Social Determinants of Health     Financial Resource Strain:     Difficulty of Paying Living Expenses: Not on file   Food Insecurity:     Worried About Running Out of Food in the Last Year: Not on file    Miesha of Food in the Last Year: Not on file   Transportation Needs:     Lack of Transportation (Medical): Not on file    Lack of Transportation (Non-Medical):  Not on file   Physical Activity:     Days of Exercise per Week: Not on file    Minutes of Exercise per Session: Not on file   Stress:     Feeling of Stress : Not on file   Social Connections:     Frequency of Communication with Friends and Family: Not on file    Frequency of Social Gatherings with Friends and Family: Not on file    Attends Temple Services: Not on file    Active Member of Clubs or Organizations: Not on file    Attends Club or Organization Meetings: Not on file    Marital Status: Not on file   Intimate Partner Violence:     Fear of Current or Ex-Partner: Not on file    Emotionally Abused: Not on file    Physically Abused: Not on file    Sexually Abused: Not on file   Housing Stability:     Unable to Pay for Housing in the Last Year: Not on file    Number of Places Lived in the Last Year: Not on file    Unstable Housing in the Last Year: Not on file       Family History:  Family History   Problem Relation Age of Onset    Cancer Mother     Cancer Father     Cancer Sister        Meds:   Current Facility-Administered Medications: calcium carbonate (TUMS) chewable tablet 500 mg, 500 mg, Oral, TID PRN  sodium chloride flush 0.9 % injection 10 mL, 10 mL, IntraVENous, 2 times per day  sodium chloride flush 0.9 % injection 10 mL, 10 mL, IntraVENous, PRN  0.9 % sodium chloride infusion, 25 mL, IntraVENous, PRN  potassium chloride (KLOR-CON M) extended release tablet 40 mEq, 40 mEq, Oral, PRN **OR** potassium bicarb-citric acid (EFFER-K) effervescent tablet 40 mEq, 40 mEq, Oral, PRN **OR** potassium chloride 10 mEq/100 mL IVPB (Peripheral Line), 10 mEq, IntraVENous, PRN  magnesium sulfate 2000 mg in 50 mL IVPB premix, 2,000 mg, IntraVENous, PRN  promethazine (PHENERGAN) tablet 12.5 mg, 12.5 mg, Oral, Q6H PRN **OR** ondansetron (ZOFRAN) injection 4 mg, 4 mg, IntraVENous, Q6H PRN  magnesium hydroxide (MILK OF MAGNESIA) 400 MG/5ML suspension 30 mL, 30 mL, Oral, Daily PRN  acetaminophen (TYLENOL) tablet 650 mg, 650 mg, Oral, Q6H PRN **OR** acetaminophen (TYLENOL) suppository 650 mg, 650 mg, Rectal, Q6H PRN  amiodarone (CORDARONE) tablet 200 mg, 200 mg, Oral, BID  atorvastatin (LIPITOR) tablet 10 mg, 10 mg, Oral, Daily  ferrous sulfate EC tablet 324 mg, 324 mg, Oral, TID WC  lisinopril (PRINIVIL;ZESTRIL) tablet 10 mg, 10 mg, Oral, Daily  metoprolol succinate (TOPROL XL) extended release tablet 50 mg, 50 mg, Oral, Daily  pantoprazole (PROTONIX) tablet 40 mg, 40 mg, Oral, QAM AC  spironolactone (ALDACTONE) tablet 25 mg, 25 mg, Oral, Daily    Review of Systems:  10 Systems were reviewed and negative except as in HPI    Vitals:  /85   Pulse 66   Temp 97.6 °F (36.4 °C) (Oral)   Resp 18   Ht 6' 4\" (1.93 m)   Wt 218 lb 14.7 oz (99.3 kg)   SpO2 98%   BMI 26.65 kg/m²     Intake/Output Summary (Last 24 hours) at 2/10/2022 1158  Last data filed at 2/10/2022 0844  Gross per 24 hour   Intake 250 ml   Output --   Net 250 ml       Physical Exam:  General Appearance: Alert and oriented, cooperative, no distress, appears stated age  Head: Normocephalic, without obvious abnormality, atraumatic  Back: no CVA tenderness  Lungs: respirations unlabored, no wheezing  Heart: Regular rate and rhythm, no lower extremity edema noted  Abdomen: Soft, non-tender, non-distended, no masses  Skin: Skin color, texture, turgor normal, no rashes or lesions  Neurologic: no gross deficits   Male :   Nonpalpable bladder   No CVA tenderness    Labs:  CBC   Lab Results   Component Value Date    WBC 5.6 02/10/2022    RBC 4.48 02/10/2022    HGB 12.2 02/10/2022    HCT 37.4 02/10/2022    MCV 83.5 02/10/2022 MCH 27.2 02/10/2022    MCHC 32.6 02/10/2022    RDW 15.8 02/10/2022     02/10/2022    MPV 6.9 02/10/2022     BMP   Lab Results   Component Value Date     02/10/2022    K 4.9 02/10/2022    CL 98 02/10/2022    CO2 22 02/10/2022    BUN 19 02/10/2022    CREATININE 1.2 02/10/2022    GLUCOSE 107 02/10/2022    CALCIUM 9.8 02/10/2022       Urinalysis:   Lab Results   Component Value Date    COLORU KIMBERLY 02/09/2022    GLUCOSEU Negative 02/09/2022    BLOODU LARGE 02/09/2022    NITRU Negative 02/09/2022    LEUKOCYTESUR TRACE 02/09/2022       Imaging: Renal ultrasound and prior CT reports from [facility] reviewed   pertinent images and radiologist's report were reviewed independently      Impression/Plan:   Gross hematuria, presumably related to right renal lesion and anticoagulation.     History of cryoablation of right kidney with CT scan last year showing no disease recurrence    We will repeat MRI abdomen and pelvis for further characterization of right renal lesion, rule out another source of hematuria    Hold anticoagulation for now      Priya Mcgrath MD 9/51/[id number]:52 AM

## 2022-02-10 NOTE — FLOWSHEET NOTE
02/10/22 0858   Readmission Assessment   Number of Days since last admission? 1-7 days   Previous Disposition Home Alone   Who is being Interviewed Patient   What was the patient's/caregiver's perception as to why they think they needed to return back to the hospital? Other (Comment)  (blood in urine--here last time due to palpitations)   Did you visit your Primary Care Physician after you left the hospital, before you returned this time? Yes  (Called the office re: blood in urine)   Did you see a specialist, such as Cardiac, Pulmonary, Orthopedic Physician, etc. after you left the hospital? No   Who advised the patient to return to the hospital? Self-referral   Does the patient report anything that got in the way of taking their medications? No   In our efforts to provide the best possible care to you and others like you, can you think of anything that we could have done to help you after you left the hospital the first time, so that you might not have needed to return so soon?  Other (Comment)  (blood in urine--here last time due to palpitations)

## 2022-02-10 NOTE — PROGRESS NOTES
Transport arrived to take patient to MRI. Patient refusing to leave unit w/o PRN to manage claustrophobia. Second message sent to MD Harrison requesting PRN Ativan. Neither message read at this time.

## 2022-02-10 NOTE — H&P
Hospital Medicine History & Physical      PCP: Samara Escobedo    Date of Admission: 2/9/2022    Chief Complaint:  hematuria    History Of Present Illness:  Patient is a 36-year-old male with new diagnosis of atrial fibrillation recently started on Eliquis, cardiomyopathy with low EF who presents to the hospital for blood in urine. According the patient he was recently discharged from the hospital he was discharged on Eliquis but he noted nauseated and started having blood in urine otherwise denies chest pain shortness of breath nausea vomiting diarrhea constipation dysuria he denies any pain. On further evaluation in the emergency department he was noted to have 5.3 cm mass on the superior pole of right kidney concerning for neoplasm. Past Medical History:          Diagnosis Date    Arthritis     Cancer ([address]     skin-leg    Enlarged prostate     GERD (gastroesophageal reflux disease)     Gout     Hyperlipidemia     Hypertension     Neuropathy     Wears glasses     Wears partial dentures     Wears partial dentures     upper       Past Surgical History:          Procedure Laterality Date    BACK SURGERY      COLONOSCOPY      COLONOSCOPY N/A 3/16/2021    COLONOSCOPY performed by Andreia Minor MD at [address] [place] [address] Rd Left     ankle    TONSILLECTOMY         Medications Prior to Admission:      Prior to Admission medications    Medication Sig Start Date End Date Taking?  Authorizing Provider   omeprazole (PRILOSEC) 10 MG delayed release capsule Take 10 mg by mouth daily as needed (acid reflux)   Yes Historical Provider, MD   atorvastatin (LIPITOR) 10 MG tablet Take 10 mg by mouth daily   Yes Historical Provider, MD   amiodarone (CORDARONE) 200 MG tablet Take 1 tablet by mouth 2 times daily 2/7/22  Yes DUKE Ramachandran NP   apixaban (ELIQUIS) 5 MG TABS tablet Take 1 tablet by mouth 2 times daily 2/7/22  Yes DUKE Ramachandran NP   metoprolol succinate (TOPROL XL) 50 MG extended release tablet Take 1 tablet by mouth daily 2/8/22  Yes DUKE Santana NP   spironolactone (ALDACTONE) 25 MG tablet Take 1 tablet by mouth daily 2/8/22  Yes DUKE Abrams NP   ferrous sulfate (FE TABS 325) 325 (65 Fe) MG EC tablet Take 325 mg by mouth 3 times daily (with meals)   Yes Historical Provider, MD   lisinopril (PRINIVIL;ZESTRIL) 10 MG tablet Take 10 mg by mouth daily  6/5/20  Yes Historical Provider, MD   acetaminophen (TYLENOL) 325 MG tablet Take 650 mg by mouth every 6 hours as needed for Pain    Historical Provider, MD       Allergies:  Patient has no known allergies. Social History:      TOBACCO:   reports that he has never smoked. He has never used smokeless tobacco.  ETOH:   reports previous alcohol use. Family History:       Reviewed in detail and non contributory          Problem Relation Age of Onset    Cancer Mother     Cancer Father     Cancer Sister        REVIEW OF SYSTEMS:   Pertinent positives as noted in the HPI. All other systems reviewed and negative. PHYSICAL EXAM PERFORMED:    /72   Pulse 60   Temp 98.3 °F (36.8 °C) (Oral)   Resp 20   Ht 6' 4\" (1.93 m)   Wt 223 lb 5.2 oz (101.3 kg)   SpO2 97%   BMI 27.18 kg/m²     General appearance:  No apparent distress, cooperative. HEENT:  Normal cephalic, atraumatic without obvious deformity. Conjunctivae/corneas clear. Neck: Supple, with full range of motion. No cervical lymphadenopathy  Respiratory:  Normal respiratory effort. Clear to auscultation, bilaterally without Rales/Wheezes/Rhonchi. Cardiovascular:  Regular rate and rhythm with normal S1/S2 without murmurs, rubs or gallops. Abdomen: Soft, non-tender, non-distended, normal bowel sounds. Musculoskeletal:  No edema noted bilaterally. No tenderness on palpation   Skin: no rash visible  Neurologic:  Neurologically intact without any focal sensory/motor deficits.   grossly non-focal.  Psychiatric:  Alert and oriented, normal mood  Peripheral Pulses: +2 palpable, equal bilaterally       Labs:     Recent Labs     02/07/22  0447 02/09/22  1345   WBC 5.5 6.2   HGB 11.0* 13.1*   HCT 34.0* 39.8*    315     Recent Labs     02/07/22  0447 02/09/22  1345    132*   K 4.6 4.9    100   CO2 21 19*   BUN 23* 18   CREATININE 1.3 1.3   CALCIUM 8.8 9.3     Recent Labs     02/09/22  1345   AST 16   ALT 18   BILITOT 0.6   ALKPHOS 115     No results for input(s): INR in the last 72 hours. Recent Labs     02/09/22  1345   TROPONINI <0.01       Urinalysis:      Lab Results   Component Value Date    NITRU Negative 02/09/2022    WBCUA 3 02/09/2022    RBCUA >900 02/09/2022    BLOODU LARGE 02/09/2022    SPECGRAV 1.014 02/09/2022    GLUCOSEU Negative 02/09/2022       Radiology:       US RENAL COMPLETE   Final Result   5.3 cm mass of the superior pole of the right kidney. Although there is   history of previous cryoablation, the ultrasound features are masslike and   concerning for neoplasm. Further characterization by MRI (or CT) with   intravenous contrast and renal mass protocol recommended when feasible. Mild left pyelocaliectasis, uncertain significance. Mild enlargement of prostate gland. Active Hospital Problems    Diagnosis Date Noted    Hematuria [R31.9] 02/09/2022       Patient is a 81-year-old male with new diagnosis of atrial fibrillation recently started on Eliquis, cardiomyopathy with low EF who presents to the hospital for blood in urine. According the patient he was recently discharged from the hospital he was discharged on Eliquis but he noted nauseated and started having blood in urine otherwise denies chest pain shortness of breath nausea vomiting diarrhea constipation dysuria he denies any pain. On further evaluation in the emergency department he was noted to have 5.3 cm mass on the superior pole of right kidney concerning for neoplasm.     Assessment  Hematuria on Eliquis  Atrial fibrillation on Eliquis  Cardiomyopathy with low EF  New mass on superior pole of right kidney, concerning for neoplasm    Plan  Hold Eliquis, monitor hemoglobin, consult urology for hematuria, consult oncology for neoplastic looking mass on right upper pole of kidney  Consider consulting cardiology if patient can have alternative anticoagulation for A. fib  Resume home medications  DVT prophylaxis-SCDs only given hematuria  Diet: ADULT DIET; Regular  Code Status: Full Code    PT/OT Eval Status: ordered    Dispo - pending clinical improvement       Ethan Pillai MD    The note was completed using EMR and Dragon dictation system. Every effort was made to ensure accuracy; however, inadvertent computerized transcription errors may be present. Thank you [employer] for the opportunity to be involved in this patient's care. If you have any questions or concerns please feel free to contact me at [phone].     Ethan Pillai MD

## 2022-02-10 NOTE — CARE COORDINATION
INITIAL CASE MANAGEMENT ASSESSMENT    Reviewed chart, met with patient to assess possible discharge needs. Explained Case Management role/services. 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 DME: None    PT/OT Recs: Not needed per notes     Active Services: None     Transportation: Drives     Medications: Slime Scale    PCP: Gabriela Camargo      HD/PD: NA    PLAN/COMMENTS: Patient anxious for d/c to home in am and denies any needs. SW/CM provided contact information for patient or family to call with any questions. SW/CM will follow and assist as needed.

## 2022-02-10 NOTE — PLAN OF CARE
Problem: Falls - Risk of:  Goal: Will remain free from falls  Description: Will remain free from falls  2/10/2022 0046 by Yevgeniy Joseph RN  Outcome: Ongoing  Note: Fall risk assessment completed. Fall precautions in place. Bed in lowest position, wheels locked, bed/chair exit alarm in place, call light within reach, and non skid footwear on. Walkway free of clutter. Pt alert and oriented and able to make needs known. Pt educated to use call light when needing to get up, and pt utilizes call light to make needs known. Will continue to monitor. Problem: Falls - Risk of:  Goal: Absence of physical injury  Description: Absence of physical injury  Outcome: Ongoing     Problem: Skin Integrity:  Goal: Will show no infection signs and symptoms  Description: Will show no infection signs and symptoms  2/10/2022 0046 by Yevgeniy Joseph RN  Outcome: Ongoing  Note: Pt assessed for skin break down. Skin was warm and dry to touch. Will continue to monitor and assess. Problem: Skin Integrity:  Goal: Absence of new skin breakdown  Description: Absence of new skin breakdown  2/10/2022 0046 by Yevgeniy Joseph RN  Outcome: Ongoing     Problem: Pain:  Goal: Pain level will decrease  Description: Pain level will decrease  2/10/2022 0046 by Yevgeniy Joseph RN  Outcome: Ongoing  Note: Assessing and monitoring pt's pain. PRN pain medication being given if ordered. Educating pt on nonpharmacologic interventions for pain control. Will continue to monitor and reassess.

## 2022-02-10 NOTE — PROGRESS NOTES
Hospitalist Progress Note      PCP: Sandeep DRAKE    Date of Admission: 2/9/2022    Chief Complaint:   Chief Complaint   Patient presents with    Hematuria     pt states he was dx with afib and dc yesterday. first dose of blood thinner yesterday per report. pt noticed blood in urine. Hospital Course: 55-year-old male with past medical history significant for systolic heart failure with low ejection fraction recently placed on anticoagulation with Eliquis presents with hematuria    Urology and oncology was consulted for evaluation. Subjective: Improving hematuria      Medications:  Reviewed    Infusion Medications    sodium chloride       Scheduled Medications    sodium chloride flush  10 mL IntraVENous 2 times per day    amiodarone  200 mg Oral BID    atorvastatin  10 mg Oral Daily    ferrous sulfate  324 mg Oral TID WC    lisinopril  10 mg Oral Daily    metoprolol succinate  50 mg Oral Daily    pantoprazole  40 mg Oral QAM AC    spironolactone  25 mg Oral Daily     PRN Meds: calcium carbonate, sodium chloride flush, sodium chloride, potassium chloride **OR** potassium alternative oral replacement **OR** potassium chloride, magnesium sulfate, promethazine **OR** ondansetron, magnesium hydroxide, acetaminophen **OR** acetaminophen      Intake/Output Summary (Last 24 hours) at 2/10/2022 1343  Last data filed at 2/10/2022 0844  Gross per 24 hour   Intake 250 ml   Output --   Net 250 ml       Physical Exam Performed:    /85   Pulse 66   Temp 97.6 °F (36.4 °C) (Oral)   Resp 18   Ht 6' 4\" (1.93 m)   Wt 218 lb 14.7 oz (99.3 kg)   SpO2 98%   BMI 26.65 kg/m²     General appearance: No apparent distress, appears stated age and cooperative. HEENT: Pupils equal, round, and reactive to light. Conjunctivae/corneas clear. Neck: Supple, with full range of motion. No jugular venous distention. Trachea midline. Respiratory:  Normal respiratory effort.  Clear to auscultation, bilaterally without Rales/Wheezes/Rhonchi. Cardiovascular: Regular rate and rhythm with normal S1/S2 without murmurs, rubs or gallops. Abdomen: Soft, non-tender, non-distended with normal bowel sounds. Musculoskeletal: No clubbing, cyanosis or edema bilaterally. Full range of motion without deformity. Skin: Skin color, texture, turgor normal.  No rashes or lesions. Neurologic:  Neurovascularly intact without any focal sensory/motor deficits. Cranial nerves: II-XII intact, grossly non-focal.  Psychiatric: Alert and oriented, thought content appropriate, normal insight  Capillary Refill: Brisk,3 seconds, normal   Peripheral Pulses: +2 palpable, equal bilaterally       Labs:   Recent Labs     02/09/22  1345 02/10/22  0501   WBC 6.2 5.6   HGB 13.1* 12.2*   HCT 39.8* 37.4*    282     Recent Labs     02/09/22  1345 02/10/22  0501   * 132*   K 4.9 4.9    98*   CO2 19* 22   BUN 18 19   CREATININE 1.3 1.2   CALCIUM 9.3 9.8     Recent Labs     02/09/22  1345   AST 16   ALT 18   BILITOT 0.6   ALKPHOS 115     No results for input(s): INR in the last 72 hours. Recent Labs     02/09/22  1345   TROPONINI <0.01       Urinalysis:      Lab Results   Component Value Date    NITRU Negative 02/09/2022    WBCUA 3 02/09/2022    RBCUA >900 02/09/2022    BLOODU LARGE 02/09/2022    SPECGRAV 1.014 02/09/2022    GLUCOSEU Negative 02/09/2022       Radiology:  MRI PELVIS W WO CONTRAST   Preliminary Result   Unremarkable MRI of the pelvis. No acute abnormality noted of the bladder or   prostate within the limits of this exam.         MRI ABDOMEN W WO CONTRAST   Final Result   1. History of cryoablation of a right renal mass in 2014 with normal pattern   fat necrosis at the operative site. 2. There is no evidence of residual or recurrent renal lesion. 3. Multiple Bosniak 1 simple cysts are otherwise noted bilaterally. 4. Evidence of chronic medical renal disease also noted.          US RENAL COMPLETE   Final Result 5.3 cm mass of the superior pole of the right kidney. Although there is   history of previous cryoablation, the ultrasound features are masslike and   concerning for neoplasm. Further characterization by MRI (or CT) with   intravenous contrast and renal mass protocol recommended when feasible. Mild left pyelocaliectasis, uncertain significance. Mild enlargement of prostate gland. Assessment/Plan:    Active Hospital Problems    Diagnosis     Renal mass [N28.89]     Hematuria [R31.9]      Continue to hold Eliis  Urology recommended to repeat MRI. MRI with postsurgical changes after recent ablation  Anticipating discharge in a.m. if hematuria has resolved  Monitor H&H  Oncology recommendations noted. No plan for further interventions or work-up during this hospital admission. DVT Prophylaxis: none  Diet: ADULT DIET;  Regular  Code Status: Full Code    PT/OT Eval Status: None    Dispo - home    Blas Moncada MD

## 2022-02-10 NOTE — PROGRESS NOTES
MRI checklist completed by patient for MRI of abdomen/pelvis. All questions posed by patient answered by this RN. Patient reports mild claustrophobia and states he has struggled to get through an MRI before. Message sent to MD Lamar Jones requesting a one time dose of medication to manage anxiety. Waiting for response at this time. Patient has been NPO since midnight per patient report. Tele box removed and patient placed into gown w/o snaps. Dentures removed. Scheduled morning medications held at this time as patient is NPO. This RN to administer morning meds after patient returns to unit. Patient denies physical/emotional needs at this time. Call light, telephone, and bed side table are within reach. Fall precautions in place. Will continue to monitor and assess.

## 2022-02-10 NOTE — PROGRESS NOTES
4 Eyes Skin Assessment     NAME:  Sheng Ruth  YOB: 1935  MEDICAL RECORD NUMBER:  [id number]    The patient is being assess for  Admission    I agree that 2 RN's have performed a thorough Head to Toe Skin Assessment on the patient. ALL assessment sites listed below have been assessed. Areas assessed by both nurses:    Head, Face, Ears, Shoulders, Back, Chest, Arms, Elbows, Hands, Sacrum. Buttock, Coccyx, Ischium and Legs. Feet and Heels        Does the Patient have a Wound?  No noted wound(s)       Azael Prevention initiated:  No   Wound Care Orders initiated:  No    Pressure Injury (Stage 3,4, Unstageable, DTI, NWPT, and Complex wounds) if present place consult order under [de-identified] NA    New and Established Ostomies if present place consult order under : NA      Nurse 1 eSignature: Electronically signed by Chata Duenas RN on 2/9/22 at 9:54 PM EST    **SHARE this note so that the co-signing nurse is able to place an eSignature**    Nurse 2 eSignature: Electronically signed by Pascual Gil RN on 2/10/22 at 12:44 AM EST

## 2022-02-10 NOTE — CONSULTS
disease)     Gout     Hyperlipidemia     Hypertension     Neuropathy     Wears glasses     Wears partial dentures     Wears partial dentures     upper       PAST SURGICAL HISTORY:      Past Surgical History:   Procedure Laterality Date    BACK SURGERY      COLONOSCOPY      COLONOSCOPY N/A 3/16/2021    COLONOSCOPY performed by Leroy Ruano MD at [address] Left     ankle    TONSILLECTOMY         CURRENT MEDICATIONS:    Current Facility-Administered Medications   Medication Dose Route Frequency Provider Last Rate Last Admin    calcium carbonate (TUMS) chewable tablet 500 mg  500 mg Oral TID PRN DUKE Munroe - CNP   500 mg at 02/10/22 0015    sodium chloride flush 0.9 % injection 10 mL  10 mL IntraVENous 2 times per day Lesly Clifton MD   10 mL at 02/09/22 2033    sodium chloride flush 0.9 % injection 10 mL  10 mL IntraVENous PRN Lesly Clifton MD        0.9 % sodium chloride infusion  25 mL IntraVENous PRN Lesly Clifton MD        potassium chloride (KLOR-CON M) extended release tablet 40 mEq  40 mEq Oral PRN Lesly Clifton MD        Or    potassium bicarb-citric acid (EFFER-K) effervescent tablet 40 mEq  40 mEq Oral PRN Lesly Clifton MD        Or    potassium chloride 10 mEq/100 mL IVPB (Peripheral Line)  10 mEq IntraVENous PRN Lesly Clifton MD        magnesium sulfate 2000 mg in 50 mL IVPB premix  2,000 mg IntraVENous PRN Lesly Clifton MD        promethazine (PHENERGAN) tablet 12.5 mg  12.5 mg Oral Q6H PRN Lesly Clifton MD        Or    ondansetron (ZOFRAN) injection 4 mg  4 mg IntraVENous Q6H PRN Lesly Clifton MD        magnesium hydroxide (MILK OF MAGNESIA) 400 MG/5ML suspension 30 mL  30 mL Oral Daily PRN Lesly Clifton MD        acetaminophen (TYLENOL) tablet 650 mg  650 mg Oral Q6H PRN Lesly Clifton MD        Or    acetaminophen (TYLENOL) suppository 650 mg  650 mg Rectal Q6H PRN Lesly Clifton MD        amiodarone (CORDARONE) tablet 200 mg  200 mg Oral BID Nani Weston MD   200 mg at 02/09/22 2030    atorvastatin (LIPITOR) tablet 10 mg  10 mg Oral Daily Nani Weston MD        ferrous sulfate EC tablet 324 mg  324 mg Oral TID WC Nani Weston MD   324 mg at 02/09/22 2030    lisinopril (PRINIVIL;ZESTRIL) tablet 10 mg  10 mg Oral Daily Nani Weston MD        metoprolol succinate (TOPROL XL) extended release tablet 50 mg  50 mg Oral Daily Nani Weston MD        pantoprazole (PROTONIX) tablet 40 mg  40 mg Oral QAM AC Nani Weston MD   40 mg at 02/10/22 0503    spironolactone (ALDACTONE) tablet 25 mg  25 mg Oral Daily Nani Weston MD           ALLERGIES:    No Known Allergies    SOCIAL HISTORY:    Social History     Socioeconomic History    Marital status: Single     Spouse name: None    Number of children: None    Years of education: None    Highest education level: None   Occupational History    None   Tobacco Use    Smoking status: Never Smoker    Smokeless tobacco: Never Used   Vaping Use    Vaping Use: Never used   Substance and Sexual Activity    Alcohol use: Not Currently    Drug use: Never    Sexual activity: None   Other Topics Concern    None   Social History Narrative    None     Social Determinants of Health     Financial Resource Strain:     Difficulty of Paying Living Expenses: Not on file   Food Insecurity:     Worried About Running Out of Food in the Last Year: Not on file    Miesha of Food in the Last Year: Not on file   Transportation Needs:     Lack of Transportation (Medical): Not on file    Lack of Transportation (Non-Medical):  Not on file   Physical Activity:     Days of Exercise per Week: Not on file    Minutes of Exercise per Session: Not on file   Stress:     Feeling of Stress : Not on file   Social Connections:     Frequency of Communication with Friends and Family: Not on file    Frequency of Social Gatherings with Friends and Family: Not on file    Attends Buddhist Services: Not on file    Active Member of Clubs or Organizations: Not on file    Attends Club or Organization Meetings: Not on file    Marital Status: Not on file   Intimate Partner Violence:     Fear of Current or Ex-Partner: Not on file    Emotionally Abused: Not on file    Physically Abused: Not on file    Sexually Abused: Not on file   Housing Stability:     Unable to Pay for Housing in the Last Year: Not on file    Number of [place] in the Last Year: Not on file    Unstable Housing in the Last Year: Not on file   :      FAMILY HISTORY:    Family History   Problem Relation Age of Onset    Cancer Mother     Cancer Father     Cancer Sister        REVIEW OF SYSTEMS:      · Constitutional: Denies fever, sweats, weight loss. · Eyes: No visual changes or diplopia. No scleral icterus. · ENT: No Headaches, hearing loss or vertigo. No mouth sores or sore throat. · Cardiovascular: No chest pain, dyspnea on exertion, palpitations or loss of consciousness. · Respiratory: No cough or wheezing, no sputum production. No hemoptysis. .    · Gastrointestinal: No abdominal pain, appetite loss, blood in stools. No change in bowel habits. · Genitourinary: No dysuria, trouble voiding, or hematuria. · Musculoskeletal:  No weakness. No joint complaints. · Integumentary: No rash or pruritis. · Neurological: No headache, diplopia. No change in gait, balance, or coordination. No paresthesias. · Endocrine: No temperature intolerance. No excessive thirst, fluid intake, or urination. · Hematologic/Lymphatic: No abnormal bruising or ecchymoses, blood clots or swollen lymph nodes. · Allergic/Immunologic: No nasal congestion or hives.      PHYSICAL EXAM:      Vitals:  /63   Pulse 59   Temp 97.8 °F (36.6 °C) (Oral)   Resp 17   Ht 6' 4\" (1.93 m)   Wt 218 lb 14.7 oz (99.3 kg)   SpO2 95%   BMI 26.65 kg/m²     CONSTITUTIONAL: Well-appearing, no acute distress  EYES: Sclera clear, conjunctiva normal  ENT: Oral pharynx unremarkable with moist is not new but there is concerned that it may be enlarged as compared to his CAT scan of 3/24/2021. At that time it measured 2.3 x 4.5 cm and now, by ultrasound, it appears to be larger. This is not a direct comparison. Patient is being seen by urology and an MRI ordered. Management will be as per urology. I suspect no intervention will be recommended. From an oncologic perspective patient is stable and he states he wishes to follow at [facility].  Incidentally, there was a 3 mm nodule seen in the right lower lobe that was also unchanged from prior studies but does warrant follow-up. We discussed the natural history, epidemiology, risk factors, and staging of kidney cancer. He was a bit surprised that we did not seem to be too enthusiastic about intervention. I reminded him about his recent hospitalization and the cardiac findings of a significantly low ejection fraction. This puts him at significant surgical risk. Patient's case was discussed with the hospitalist.    Thank you very much for allowing me to participate in the care of this patient. I will plan to keep you fully appraised of your patient's progress. Timbo Cunningham. Michell Dudley M.D., MPH  Chair, Department of  Clinical [facility], [address] [place] [address]  Office [phone]  Cell [phone]  [email or web address]

## 2022-02-11 VITALS
WEIGHT: 219.58 LBS | HEART RATE: 58 BPM | TEMPERATURE: 97.8 F | OXYGEN SATURATION: 96 % | SYSTOLIC BLOOD PRESSURE: 124 MMHG | RESPIRATION RATE: 16 BRPM | HEIGHT: 76 IN | DIASTOLIC BLOOD PRESSURE: 71 MMHG | BODY MASS INDEX: 26.74 KG/M2

## 2022-02-11 LAB
ANION GAP SERPL CALCULATED.3IONS-SCNC: 12 MMOL/L (ref 3–16)
BUN BLDV-MCNC: 20 MG/DL (ref 7–20)
CALCIUM SERPL-MCNC: 9.3 MG/DL (ref 8.3–10.6)
CHLORIDE BLD-SCNC: 101 MMOL/L (ref 99–110)
CO2: 23 MMOL/L (ref 21–32)
CREAT SERPL-MCNC: 1.4 MG/DL (ref 0.8–1.3)
GFR AFRICAN AMERICAN: 58
GFR NON-AFRICAN AMERICAN: 48
GLUCOSE BLD-MCNC: 114 MG/DL (ref 70–99)
HCT VFR BLD CALC: 39.2 % (ref 40.5–52.5)
HEMOGLOBIN: 12.9 G/DL (ref 13.5–17.5)
MCH RBC QN AUTO: 27.8 PG (ref 26–34)
MCHC RBC AUTO-ENTMCNC: 32.8 G/DL (ref 31–36)
MCV RBC AUTO: 84.5 FL (ref 80–100)
PDW BLD-RTO: 15.9 % (ref 12.4–15.4)
PLATELET # BLD: 327 K/UL (ref 135–450)
PMV BLD AUTO: 6.6 FL (ref 5–10.5)
POTASSIUM REFLEX MAGNESIUM: 4.7 MMOL/L (ref 3.5–5.1)
RBC # BLD: 4.64 M/UL (ref 4.2–5.9)
SODIUM BLD-SCNC: 136 MMOL/L (ref 136–145)
WBC # BLD: 6.4 K/UL (ref 4–11)

## 2022-02-11 PROCEDURE — 80048 BASIC METABOLIC PNL TOTAL CA: CPT

## 2022-02-11 PROCEDURE — 2580000003 HC RX 258: Performed by: INTERNAL MEDICINE

## 2022-02-11 PROCEDURE — 36415 COLL VENOUS BLD VENIPUNCTURE: CPT

## 2022-02-11 PROCEDURE — 85027 COMPLETE CBC AUTOMATED: CPT

## 2022-02-11 PROCEDURE — 94761 N-INVAS EAR/PLS OXIMETRY MLT: CPT

## 2022-02-11 PROCEDURE — 6370000000 HC RX 637 (ALT 250 FOR IP): Performed by: INTERNAL MEDICINE

## 2022-02-11 RX ADMIN — ATORVASTATIN CALCIUM 10 MG: 10 TABLET, FILM COATED ORAL at 09:35

## 2022-02-11 RX ADMIN — PANTOPRAZOLE SODIUM 40 MG: 40 TABLET, DELAYED RELEASE ORAL at 09:35

## 2022-02-11 RX ADMIN — METOPROLOL SUCCINATE 50 MG: 50 TABLET, EXTENDED RELEASE ORAL at 09:35

## 2022-02-11 RX ADMIN — FERROUS SULFATE TAB EC 324 MG (65 MG FE EQUIVALENT) 324 MG: 324 (65 FE) TABLET DELAYED RESPONSE at 12:10

## 2022-02-11 RX ADMIN — LISINOPRIL 10 MG: 10 TABLET ORAL at 09:35

## 2022-02-11 RX ADMIN — AMIODARONE HYDROCHLORIDE 200 MG: 200 TABLET ORAL at 09:35

## 2022-02-11 RX ADMIN — SPIRONOLACTONE 25 MG: 25 TABLET ORAL at 09:35

## 2022-02-11 RX ADMIN — SODIUM CHLORIDE, PRESERVATIVE FREE 10 ML: 5 INJECTION INTRAVENOUS at 09:35

## 2022-02-11 RX ADMIN — FERROUS SULFATE TAB EC 324 MG (65 MG FE EQUIVALENT) 324 MG: 324 (65 FE) TABLET DELAYED RESPONSE at 09:35

## 2022-02-11 ASSESSMENT — PAIN SCALES - GENERAL: PAINLEVEL_OUTOF10: 0

## 2022-02-11 NOTE — DISCHARGE SUMMARY
Hospital Medicine Discharge Summary    Patient ID: Raj Orona      Patient's PCP: Darcy Steiner DRAKE    Admit Date: 2/9/2022     Discharge Date:   02/11/22    Admitting Provider: Nandini Monsivais MD     Discharge Provider: Bee Cade MD     Discharge Diagnoses: Active Hospital Problems    Diagnosis     Renal mass [N28.89]     Hematuria [R31.9]        The patient was seen and examined on day of discharge and this discharge summary is in conjunction with any daily progress note from day of discharge. Hospital Course: 18-year-old male with past medical history significant for hypertension, atrial fibrillation and renal mass presented with hematuria. Patient had recently been started on Eliquis    Urology was consulted for evaluation. Urology recommended to repeat abdominal MRI to reassess renal mass. Renal mass without changes. Patient had a recent history of ablation. By the time of discharge, hematuria had resolved. Patient will be taking of Eliquis for now until he sees his primary care cardiologist.  Urology follow-up recommended          Physical Exam Performed:     /74   Pulse 64   Temp 98.4 °F (36.9 °C) (Oral)   Resp 16   Ht 6' 4\" (1.93 m)   Wt 219 lb 9.3 oz (99.6 kg)   SpO2 96%   BMI 26.73 kg/m²       General appearance:  No apparent distress, appears stated age and cooperative. HEENT:  Normal cephalic, atraumatic without obvious deformity. Pupils equal, round, and reactive to light. Extra ocular muscles intact. Conjunctivae/corneas clear. Neck: Supple, with full range of motion. No jugular venous distention. Trachea midline. Respiratory:  Normal respiratory effort. Clear to auscultation, bilaterally without Rales/Wheezes/Rhonchi. Cardiovascular:  Regular rate and rhythm with normal S1/S2 without murmurs, rubs or gallops. Abdomen: Soft, non-tender, non-distended with normal bowel sounds. Musculoskeletal:  No clubbing, cyanosis or edema bilaterally. Full range of motion without deformity. Skin: Skin color, texture, turgor normal.  No rashes or lesions. Neurologic:  Neurovascularly intact without any focal sensory/motor deficits. Cranial nerves: II-XII intact, grossly non-focal.  Psychiatric:  Alert and oriented, thought content appropriate, normal insight  Capillary Refill: Brisk,< 3 seconds   Peripheral Pulses: +2 palpable, equal bilaterally       Labs: For convenience and continuity at follow-up the following most recent labs are provided:      CBC:    Lab Results   Component Value Date    WBC 6.4 02/11/2022    HGB 12.9 02/11/2022    HCT 39.2 02/11/2022     02/11/2022       Renal:    Lab Results   Component Value Date     02/11/2022    K 4.7 02/11/2022     02/11/2022    CO2 23 02/11/2022    BUN 20 02/11/2022    CREATININE 1.4 02/11/2022    CALCIUM 9.3 02/11/2022         Significant Diagnostic Studies    Radiology:   MRI PELVIS W WO CONTRAST   Preliminary Result   Unremarkable MRI of the pelvis. No acute abnormality noted of the bladder or   prostate within the limits of this exam.         MRI ABDOMEN W WO CONTRAST   Final Result   1. History of cryoablation of a right renal mass in 2014 with normal pattern   fat necrosis at the operative site. 2. There is no evidence of residual or recurrent renal lesion. 3. Multiple Bosniak 1 simple cysts are otherwise noted bilaterally. 4. Evidence of chronic medical renal disease also noted. US RENAL COMPLETE   Final Result   5.3 cm mass of the superior pole of the right kidney. Although there is   history of previous cryoablation, the ultrasound features are masslike and   concerning for neoplasm. Further characterization by MRI (or CT) with   intravenous contrast and renal mass protocol recommended when feasible. Mild left pyelocaliectasis, uncertain significance. Mild enlargement of prostate gland.                 Consults:     IP CONSULT TO HOSPITALIST  IP CONSULT TO ONCOLOGY  IP CONSULT TO UROLOGY    Disposition:  home     Condition at Discharge: Stable    Discharge Instructions/Follow-up:  Follow-up with PCP within 7 days from discharge, not doing so could have life-threatening consequences. Take medication as instructed;  return to the emergency department if persistent symptoms, experiencing side effects from medication including nausea vomiting or unable to keep medications down. Code Status:  Full Code     Activity: activity as tolerated    Diet: cardiac diet      Discharge Medications:     Current Discharge Medication List           Details   omeprazole (PRILOSEC) 10 MG delayed release capsule Take 10 mg by mouth daily as needed (acid reflux)      atorvastatin (LIPITOR) 10 MG tablet Take 10 mg by mouth daily      amiodarone (CORDARONE) 200 MG tablet Take 1 tablet by mouth 2 times daily  Qty: 60 tablet, Refills: 0      metoprolol succinate (TOPROL XL) 50 MG extended release tablet Take 1 tablet by mouth daily  Qty: 30 tablet, Refills: 0      spironolactone (ALDACTONE) 25 MG tablet Take 1 tablet by mouth daily  Qty: 30 tablet, Refills: 3      ferrous sulfate (FE TABS 325) 325 (65 Fe) MG EC tablet Take 325 mg by mouth 3 times daily (with meals)      lisinopril (PRINIVIL;ZESTRIL) 10 MG tablet Take 10 mg by mouth daily              Time Spent on discharge is more than 30 minutes in the examination, evaluation, counseling and review of medications and discharge plan. Signed:    Magalys Starks MD   2/11/2022      Thank you [facility] [employer] [facility] for the opportunity to be involved in this patient's care. If you have any questions or concerns please feel free to contact me at [phone].

## 2022-02-11 NOTE — PROGRESS NOTES
Hematuria has resolved. .. MRI shows no sign of renal malignancy. Patient was informed of these results. I would recommend that he have cystoscopy to complete the workup for his hematuria. He regularly sees a urologist at [facility] and will follow-up with him as an outpatient.

## 2022-02-11 NOTE — PROGRESS NOTES
No blood noted in urine this shift. Alphonso Jain remains in patient's toilet. Patient UAL and independent w/ voiding. Patient encouraged to leave urine in hat/ urinal after voiding so this RN can examine output to assess for ongoing hematuria. Patient reports that he will, but states \"if there's any blood in there I'm going to dump it so you don't see it and I can go home\". Patient educated on the importance of letting staff see all urine so we can properly treat and manage his hematuria. Patient reports understanding but does not appear to be agreeable. Patient denies physical/emotional needs at this time. Call light, telephone, and bed side table are within reach. Fall precautions in place. Will continue to monitor and assess.

## 2022-02-11 NOTE — PROGRESS NOTES
A&Ox4. Pt resting in bed. Moves around independently. Tolerating intake and PO medications whole. No c/o pain this AM. No hematuria noted this AM, pt states none as well. Able to make needs known. Call light within reach. Fall precautions in place. Will monitor.  Electronically signed by Ileana Jim RN on 2/11/2022 at 10:53 AM

## 2022-02-11 NOTE — DISCHARGE INSTR - DIET
Good nutrition is important when healing from an illness, injury, or surgery. Follow any nutrition recommendations given to you during your hospital stay. If you were given an oral nutrition supplement while in the hospital, continue to take this supplement at home. You can take it with meals, in-between meals, and/or before bedtime. These supplements can be purchased at most local grocery stores, pharmacies, and chain [employer]-stores. If you have any questions about your diet or nutrition, call the hospital and ask for the dietitian.       Resume diet as tolerated

## 2022-02-11 NOTE — PROGRESS NOTES
Data- discharge order received, pt verbalized agreement to discharge, disposition to previous residence, no needs for HHC/DME. Action- discharge instructions prepared and given to pt, pt verbalized understanding. Medication information packet given r/t NEW and/or CHANGED prescriptions emphasizing name/purpose/side effects, pt verbalized understanding. Discharge instruction summary: Diet- Regular, Activity- UAL, Primary Care Physician as follows: Flower Baird [phone] f/u appointment, immunizations reviewed. Response- Pt belongings gathered, IV removed. Disposition is home (no HHC/DME needs), transported with private car, taken to lobby via w/c w/ belongings and D/C paperwork, no complications. No further questions from pt.      Electronically signed by Eric Madison RN on 2/11/2022 at 12:44 PM

## 2022-02-11 NOTE — DISCHARGE INSTR - COC
Continuity of Care Form    Patient Name: Jill Li   :  1935  MRN:  [id number]    Admit date:  2022  Discharge date:  ***    Code Status Order: Full Code   Advance Directives:      Admitting Physician:  Nani Weston MD  PCP: Zion DRAKE    Discharging Nurse: [facility] Unit/Room#: B5T-1384/2402-05  Discharging Unit Phone Number: ***    Emergency Contact:   Extended Emergency Contact Information  Primary Emergency Contact: Julian Kidd, [address] Phone: [phone]  Relation: Niece/Nephew  Secondary Emergency Contact: Sanjuanita Plascencia  Home Phone: [phone]  Relation: Niece/Nephew  Preferred language: English   needed?  No    Past Surgical History:  Past Surgical History:   Procedure Laterality Date    BACK SURGERY      COLONOSCOPY      COLONOSCOPY N/A 3/16/2021    COLONOSCOPY performed by Richie Dhillon MD at [facility] Left     ankle    TONSILLECTOMY         Immunization History:   Immunization History   Administered Date(s) Administered    COVID-19, Pfizer Purple top, DILUTE for use, 12+ yrs, 30mcg/0.3mL dose 2021, 2021       Active Problems:  Patient Active Problem List   Diagnosis Code    Tachycardia R00.0    Hematuria R31.9    Renal mass N28.89       Isolation/Infection:   Isolation            No Isolation          Patient Infection Status       Infection Onset Added Last Indicated Last Indicated By Review Planned Expiration Resolved Resolved By    None active    Resolved    COVID-19 (Rule Out) 22 COVID-19, Rapid (Ordered)   22 Rule-Out Test Resulted            Nurse Assessment:  Last Vital Signs: /74   Pulse 64   Temp 98.4 °F (36.9 °C) (Oral)   Resp 16   Ht 6' 4\" (1.93 m)   Wt 219 lb 9.3 oz (99.6 kg)   SpO2 96%   BMI 26.73 kg/m²     Last documented pain score (0-10 scale): Pain Level: 0  Last Weight:   Wt Readings from Last 1 Encounters:   22 219 lb 9.3 oz (99.6 kg)     Mental Status:  {IP PT MENTAL STATUS:}    IV Access:  { REYNALDO IV ACCESS:973320078}    Nursing Mobility/ADLs:  Walking   {CHP DME DPUY:438094321}  Transfer  {CHP DME KKBX:164838955}  Bathing  {CHP DME NPTW:244132699}  Dressing  {CHP DME ABVF:335821940}  Toileting  {CHP DME NDZV:204020831}  Feeding  {[facility] DME OLHK:380114852}  Med Admin  {P DME RNPX:010754849}  Med Delivery   { REYNALDO MED Delivery:953937763}    Wound Care Documentation and Therapy:        Elimination:  Continence: Bowel: {YES / KT:26339}  Bladder: {YES / SX:91460}  Urinary Catheter: {Urinary Catheter:756933279}   Colostomy/Ileostomy/Ileal Conduit: {YES / MD:84846}       Date of Last BM: ***    Intake/Output Summary (Last 24 hours) at 2022 1000  Last data filed at 2022 0949  Gross per 24 hour   Intake 960 ml   Output 300 ml   Net 660 ml     I/O last 3 completed shifts:   In: 1 [P.O.:960; I.V.:10]  Out: 300 [Urine:300]    Safety Concerns:     [address] Safety Concerns:817180300}    Impairments/Disabilities:      [address] Impairments/Disabilities:203899512}    Nutrition Therapy:  Current Nutrition Therapy:   [address] Diet List:599573826}    Routes of Feeding: {[facility] DME Other Feedings:308166588}  Liquids: {Slp liquid thickness:27180}  Daily Fluid Restriction: {CHP DME Yes amt example:122849956}  Last Modified Barium Swallow with Video (Video Swallowing Test): {Done Not Done VCUQ:337422876}    Treatments at the Time of Hospital Discharge:   Respiratory Treatments: ***  Oxygen Therapy:  {Therapy; copd oxygen:04854}  Ventilator:    { CC Vent JYGJ:136773490}    Rehab Therapies: {THERAPEUTIC INTERVENTION:7944389911}  Weight Bearing Status/Restrictions: [employer]  Weight Bearin}  Other Medical Equipment (for information only, NOT a DME order):  {EQUIPMENT:322874291}  Other Treatments: ***    Patient's personal belongings (please select all that are sent with patient):  {HUSAM DME Belongings:517740780}    RN SIGNATURE:  {Esignature:174729087}    CASE MANAGEMENT/SOCIAL WORK SECTION    Inpatient Status Date: ***    Readmission Risk Assessment Score:  Readmission Risk              Risk of Unplanned Readmission:  13           Discharging to Facility/ Agency   Name:   Address:  Phone:  Fax:    Dialysis Facility (if applicable)   Name:  Address:  Dialysis Schedule:  Phone:  Fax:    / signature: {Esignature:193890474}    PHYSICIAN SECTION    Prognosis: Good    Condition at Discharge: Stable      Update Admission H&P: No change in H&P    PHYSICIAN SIGNATURE:  Electronically signed by Blas Moncada MD on 2/11/22 at 10:01 AM EST

## 2022-02-11 NOTE — PROGRESS NOTES
Patient resting in bed at this time with no complaints, vital signs stable, on tele - NSR, A/Ox4. Patient reports no blood in urine, all needs met, will continue to monitor.

## 2022-02-11 NOTE — PROGRESS NOTES
Patient has been resting in bed this shift with no complaints, no hematuria this shift, all needs met at this time.

## 2022-02-11 NOTE — PLAN OF CARE
Problem: Falls - Risk of:  Goal: Will remain free from falls  Description: Will remain free from falls  2/11/2022 1223 by Rhona De Anda RN  Outcome: Ongoing   Fall risk assessment completed. Fall precautions in place. Call light within reach. encouraged patient to call for assistance especially if feeling dizzy, SOB, or weakness. Will continue to monitor. Problem: Falls - Risk of:  Goal: Absence of physical injury  Description: Absence of physical injury  2/11/2022 1223 by Rhona De Anda RN  Outcome: Ongoing     Problem: Skin Integrity:  Goal: Will show no infection signs and symptoms  Description: Will show no infection signs and symptoms  2/11/2022 1223 by Rhona De Anda RN  Outcome: Ongoing   Pt shows no signs of infection. Will monitor vitals. Problem: Pain:  Goal: Pain level will decrease  Description: Pain level will decrease  2/11/2022 1223 by Rhona De Anda RN  Outcome: Ongoing   Pt assessed for pain. Pt in pain and assessed with 0-10 pain rating scale. Pt given prescribed analgesic for pain. (See eMar) Pt satisfied with pain relief thus far. Will reassess and continue to monitor.

## 2022-02-11 NOTE — PLAN OF CARE
Problem: Falls - Risk of:  Goal: Will remain free from falls  Description: Will remain free from falls  2/10/2022 2352 by Carlos Munguia RN  Outcome: Ongoing  Note: Patient will remain free from falls  2/10/2022 1248 by Deng Oviedo RN  Outcome: Ongoing  Note: Patient remains free from falls during this shift. Fall precautions remain in place. Patient educated on the need to implement call light use prior to ambulation. Will continue to monitor and assess. Goal: Absence of physical injury  Description: Absence of physical injury  2/10/2022 2352 by Carlos Munguia RN  Outcome: Ongoing  Note: Patient will be absent of physical injury  2/10/2022 1248 by Deng Oviedo RN  Outcome: Ongoing  Note: Patient remains free from physical harm during this shift. Will continue to monitor and assess patient. Problem: Skin Integrity:  Goal: Will show no infection signs and symptoms  Description: Will show no infection signs and symptoms  2/10/2022 2352 by Carlos Munguia RN  Outcome: Ongoing  Note: Patient will show no signs and symptoms of infection  2/10/2022 1248 by Deng Oviedo RN  Outcome: Ongoing  Note: Patient remains free from new signs and symptoms of infection during this shift. Infection prevention measures are in place. Will continue to monitor for alterations in patient condition throughout the shift. Goal: Absence of new skin breakdown  Description: Absence of new skin breakdown  2/10/2022 2352 by Carlos Munguia RN  Outcome: Ongoing  Note: Patient will be absent of new skin breakdown  2/10/2022 1248 by Deng Oviedo RN  Outcome: Ongoing  Note: Patient skin condition and mucus membrane integrity remain unchanged during this shift. Skin breakdown prevention interventions are in place. Will continue to monitor and assess.         Problem: Pain:  Goal: Pain level will decrease  Description: Pain level will decrease  2/10/2022 2352 by Carlos Munguia RN  Outcome: Ongoing  Note: Patients pain level will decrease  2/10/2022 1248 by Alfa Villegas RN  Outcome: Ongoing  Note: Pain managed with pharmacologic and non-pharmacologic interventions during this shift. Will continue to monitor and assess for needs and change in patient condition.

## 2022-02-15 NOTE — PROGRESS NOTES
Physician Progress Note      PATIENT:               Pérez Morales  CSN #:                  [id number]  :                       1935  ADMIT DATE:       2022 1:17 PM  [address] DATE:        2022 12:42 PM  RESPONDING  PROVIDER #:        Katherin Duff TEXT:    Patient admitted with Hematuria  and is on chronic anticoagulation. If   possible, please document in the progress notes and discharge summary if you   are evaluating and/or treating any of the following: The medical record reflects the following:  Risk Factors: pt recently started on Eliquis  Clinical Indicators: per Urology consult \"Gross hematuria, presumably related   to right renal lesion and anticoagulation\"  MRI \"There is no evidence of   residual or recurrent renal lesion\"  Treatment: Held Eliquis during admission and Urology consult  Options provided:  -- Hematuria  associated with Eliquis. -- Bleeding unrelated to anticoagulation  -- Other - I will add my own diagnosis  -- Disagree - Not applicable / Not valid  -- Disagree - Clinically unable to determine / Unknown  -- Refer to Clinical Documentation Reviewer    PROVIDER RESPONSE TEXT:    This patient has Hematuria associated with Eliquis.     Query created by: Moy Zepeda on 2/15/2022 6:37 AM      Electronically signed by:  Lillie Riley 2/15/2022 8:20 AM

## 2022-02-16 NOTE — PROGRESS NOTES
[address]  Cardiology Consult Note      Luiz De Oliveira  1935, 81 y.o.      CC: no new complaints              MICAH DRAKE:      HPI:   This is a 81 y.o. male with a PMH significant for cardiomyopathy (2/2022), hypertension, hyperlipidemia, kidney CA managed at [facility] and GERD who presented to ED 2/2/22 with c/o tachycardia after blood donation. He was noted to be in SVT rate 140's. He was treated with Adenosine, but continued to have episodes of Afib and SVT. He underwent EDWARD/CV on 2/4/22. EF of 25% noted on Echo (2/2022). He was discharged on 2/7 and returned to the ED on 2/9 with c/o hematuria. He was found to have a right renal mass. Has been receiving care at [facility]. Today, he is here for management of above issues. He has no new complaints and reports compliance with all medications. He denies any heart racing or skipped beats. Has had no recurrent bleeding issues.      Past Medical History:   Diagnosis Date    Arthritis     Cancer ([address]     skin-leg    Enlarged prostate     GERD (gastroesophageal reflux disease)     Gout     Hyperlipidemia     Hypertension     Neuropathy     Wears glasses     Wears partial dentures     Wears partial dentures     upper      Past Surgical History:   Procedure Laterality Date    BACK SURGERY      COLONOSCOPY      COLONOSCOPY N/A 3/16/2021    COLONOSCOPY performed by Rukhsana Layton MD at [address] Left     ankle    TONSILLECTOMY        Family History   Problem Relation Age of Onset    Cancer Mother     Cancer Father     Cancer Sister       Social History     Tobacco Use    Smoking status: Never Smoker    Smokeless tobacco: Never Used   Vaping Use    Vaping Use: Never used   Substance Use Topics    Alcohol use: Not Currently    Drug use: Never     No Known Allergies      Review of Systems -   Constitutional: Negative for weight gain/loss; malaise, fever  Respiratory: Negative for Asthma;  cough and hemoptysis  Cardiovascular: Negative for palpitations,dizziness   Gastrointestinal: Negative for abd.pain; constipation/diarrhea;    Genitourinary: Negative for stones; hematuria; frequency hesitancy  Integumentt: Negative for rash or pruritis  Hematologic/lymphatic: Negative for blood dyscrasia; leukemia/lymphoma  Musculoskeletal: Negative for Connective tissue disease  Neurological:  Negative for Seizure   Behavioral/Psych:Negative for Bipolar disorder, Schizophrenia; Dementia  Endocrine: negative for thyroid, parathyroid disease    Physical Examination:    /68   Pulse 58   Ht 6' 4\" (1.93 m)   Wt 217 lb (98.4 kg)   SpO2 98%   BMI 26.41 kg/m²    HEENT:  Face: Atraumatic, Conjunctiva: Pink; non icteric,  Mucous Memb:  Moist, No thyromegaly or Lymphadenopathy  Respiratory:  Resp Assessment: normal, Resp Auscultation: clear  Cardiovascular: Auscultation: nl S1 & S2, Palpation:  Nl PMI; No heaves or thrills, JVP:  normal  Abdomen: Soft, non-tender, Normal bowel sounds,  No organomegaly  Extremities: No Cyanosis or Clubbing  Neurological: Oriented to time, place, and person, Non-anxious  Psychiatric: Normal mood and affect  Skin: Warm and dry,  No rash seen     Outpatient Medications Marked as Taking for the 2/18/22 encounter (Office Visit) with Mckinley Toney MD   Medication Sig Dispense Refill    aspirin 81 MG EC tablet Take 81 mg by mouth daily      atorvastatin (LIPITOR) 10 MG tablet Take 10 mg by mouth daily      amiodarone (CORDARONE) 200 MG tablet Take 1 tablet by mouth 2 times daily 60 tablet 0    metoprolol succinate (TOPROL XL) 50 MG extended release tablet Take 1 tablet by mouth daily 30 tablet 0    spironolactone (ALDACTONE) 25 MG tablet Take 1 tablet by mouth daily 30 tablet 3    lisinopril (PRINIVIL;ZESTRIL) 10 MG tablet Take 10 mg by mouth daily            Labs:   No results for input(s): WBC, HGB, HCT, PLT in the last 72 hours.   No results for input(s): NA, K, CO2, BUN, CREATININE, LABGLOM in the last 72 hours. No results for input(s): BNP in the last 72 hours. No results found for: HDL, LDLDIRECT, LDLCALC, TRIG      EK22, sinus bradycardia rate 58     ECHO: 22   Suboptimal image quality. Patient appears to be in atrial fibrillation. Overall left ventricular systolic function appears severely reduced. Ejection fraction is visually estimated to be 20-25% with diffuse   hypokinesis. Normal left ventricular wall thickness and cavity size. The right ventricle appears normal in size with moderately reduced systolic   function. Mild mitral and tricuspid regurgitation. EDWARD: 22  No Left atrial or appendage clot. Mild mitral and tricuspid regurgitation. CV: 22  Indication: AF  Anesthesia: Brevital  20  Single synchronized biphasic cardioversion. Successful  Converted to SR.    ASSESSMENT AND PLAN:    SVT  Noted on EKG 22  Rates in 130-140's range  Toprol XL for rate control     Atrial fibrillation  Paroxysmal   Noted on telemetry while hospitalized (2022)  Treated with Cardizem gtt   S/p EDWARD/CV 22; now on Amiodarone 200 mg bid; reduce to 200 mg daily   CHADS Vasc is at least 3 (HTN, AGE)  Toprol XL for rate control   On Aspirin; DOAC discontinued d/t hematuria and hx of renal mass/kidney CA. May need to be considered for Watchman if he is unable to take [address]. Normal rhythm on today's ECG    Treatment options for atrial fibrillation / flutter discussed, to include rate control, risks and benefits of anticoagulation options, antiarrhythmics, cardioversion, and possible ablation. Cardiomyopathy  EF 20-25% by Echo 2022  Continue Toprol XL, Spironolactone and Lisinopril   Limited Echo to assess heart function     Essential hypertension   BP is controlled  Risk factor modifications     Kidney cancer/Renal Mass/Prostate enlargement  S/p ablation   Managed at [facility]   Discuss with Urology to see if it is safe to resume DOAC.  If not, will refer to Dr. Le Arriola for consideration of Watchman. Mr. Angelina Fowler is doing well with no complaints of shortness of breath palpitations dizziness; however he has a very complicated issues. He was found to have atrial flutter and a cardiomyopathy. He underwent cardioversion and is on amiodarone and anticoagulation with well-controlled rate. Plan is to repeat an echocardiogram in several months to see if there is improvement from tachycardia induced cardiomyopathy. He was placed on the blood thinner but he started having hematuria and had to be stopped. He is currently not taking any medicines. The patient has prostatic enlargement and has a history of renal cancer which has been treated and is apparently in remission. He is scheduled to see a urologist.  Patient is going to need a work-up to rule out the cause of his hematuria because we need to start him back on an anticoagulant. Otherwise he would be a candidate for watchman. Return to the office in 2 months     Thank you very much for allowing me to participate in the care of your patient. Please do not hesitate to contact me if you have any questions.       Sincerely,    Carlita Shen M.D  [facility], [address] [place] [address]  Ph: [phone]  Fax: [phone]    This note was scribed in the presence of Dr. Carlita Shen MD by Willow Lira  **

## 2022-02-18 ENCOUNTER — OFFICE VISIT (OUTPATIENT)
Dept: CARDIOLOGY CLINIC | Age: 87
End: 2022-02-18
Payer: MEDICARE

## 2022-02-18 VITALS
WEIGHT: 217 LBS | BODY MASS INDEX: 26.42 KG/M2 | HEART RATE: 58 BPM | DIASTOLIC BLOOD PRESSURE: 68 MMHG | OXYGEN SATURATION: 98 % | SYSTOLIC BLOOD PRESSURE: 116 MMHG | HEIGHT: 76 IN

## 2022-02-18 DIAGNOSIS — I10 ESSENTIAL HYPERTENSION: ICD-10-CM

## 2022-02-18 DIAGNOSIS — I48.0 PAF (PAROXYSMAL ATRIAL FIBRILLATION) (HCC): Primary | ICD-10-CM

## 2022-02-18 DIAGNOSIS — Z85.528 HISTORY OF KIDNEY CANCER: ICD-10-CM

## 2022-02-18 DIAGNOSIS — I47.1 SVT (SUPRAVENTRICULAR TACHYCARDIA) (HCC): ICD-10-CM

## 2022-02-18 DIAGNOSIS — I42.8 NON-ISCHEMIC CARDIOMYOPATHY (HCC): ICD-10-CM

## 2022-02-18 PROCEDURE — G8427 DOCREV CUR MEDS BY ELIG CLIN: HCPCS | Performed by: INTERNAL MEDICINE

## 2022-02-18 PROCEDURE — 1111F DSCHRG MED/CURRENT MED MERGE: CPT | Performed by: INTERNAL MEDICINE

## 2022-02-18 PROCEDURE — 1036F TOBACCO NON-USER: CPT | Performed by: INTERNAL MEDICINE

## 2022-02-18 PROCEDURE — 99215 OFFICE O/P EST HI 40 MIN: CPT | Performed by: INTERNAL MEDICINE

## 2022-02-18 PROCEDURE — 93000 ELECTROCARDIOGRAM COMPLETE: CPT | Performed by: INTERNAL MEDICINE

## 2022-02-18 PROCEDURE — 1123F ACP DISCUSS/DSCN MKR DOCD: CPT | Performed by: INTERNAL MEDICINE

## 2022-02-18 PROCEDURE — G8484 FLU IMMUNIZE NO ADMIN: HCPCS | Performed by: INTERNAL MEDICINE

## 2022-02-18 PROCEDURE — 4040F PNEUMOC VAC/ADMIN/RCVD: CPT | Performed by: INTERNAL MEDICINE

## 2022-02-18 PROCEDURE — G8417 CALC BMI ABV UP PARAM F/U: HCPCS | Performed by: INTERNAL MEDICINE

## 2022-02-18 RX ORDER — AMIODARONE HYDROCHLORIDE 200 MG/1
200 TABLET ORAL DAILY
Qty: 30 TABLET | Refills: 5
Start: 2022-02-18 | End: 2022-05-11 | Stop reason: ALTCHOICE

## 2022-02-18 RX ORDER — ASPIRIN 81 MG/1
81 TABLET ORAL DAILY
COMMUNITY
End: 2022-03-11 | Stop reason: ALTCHOICE

## 2022-02-18 NOTE — PATIENT INSTRUCTIONS
Patient Education        Learning About Atrial Fibrillation  What is atrial fibrillation? Atrial fibrillation (say \"AY-tree-shelli dhv-xita-LLP-biggun\") is a common type of irregular heartbeat (arrhythmia). Normally, the heart beats in a strong, steady rhythm. In atrial fibrillation, a problem with the heart's electrical system causes the two upper chambers of the heart (called the atria) to quiver, or fibrillate. Atrial fibrillation can be dangerous. This is because if the heartbeat isn't strong and steady, blood can collect, or pool, in the atria. And pooled blood is more likely to form clots. Clots can travel to the brain, block blood flow, and cause a stroke. Atrial fibrillation can also lead to heart failure. This condition also upsets the normal rhythm between the atria and the lower chambers of the heart. (These chambers are called the ventricles.) The ventricles may beat fast and without a regular rhythm. What are the symptoms? Some people feel symptoms when they have episodes of atrial fibrillation. But other people don't notice any symptoms. If you have symptoms, you may feel:  · A fluttering, racing, or pounding feeling in your chest called palpitations. · Weak or tired. · Dizzy or lightheaded. · Short of breath. · Chest pain. · Confused. You may notice signs of atrial fibrillation when you check your pulse. Your pulse may seem uneven or fast.  What can you expect when you have atrial fibrillation? At first, spells of atrial fibrillation may come on suddenly and last a short time. They may go away on their own or with treatment. Over time, the spells may last longer and occur more often. They often don't go away on their own. How is it treated? Treatments can help you feel better and prevent future problems, especially stroke and heart failure. Your treatment will depend on the cause of your atrial fibrillation, your symptoms, and your risk for stroke.  Types of treatment include:  · Heart rate treatment. Medicine may be used to slow your heart rate. Your heartbeat may still be irregular. But these medicines keep your heart from beating too fast. They may also help relieve symptoms. · Heart rhythm treatment. Different treatments may be used to try to stop atrial fibrillation and keep it from returning. They can also relieve symptoms. These treatments include medicine, electrical cardioversion to shock the heart back to a normal rhythm, a procedure called catheter ablation, and heart surgery. · Stroke prevention. You and your doctor can decide how to lower your risk. You may decide to take a blood-thinning medicine called an anticoagulant. What is a heart-healthy lifestyle for atrial fibrillation? You can live well and help manage atrial fibrillation by having a heart-healthy lifestyle. This lifestyle may help reduce how often you have episodes of atrial fibrillation. Don't smoke. Avoid secondhand smoke too. Quitting smoking is the best thing you can do for your heart. Be active. Talk to your doctor about what type and level of exercise is safe for you. Eat a heart-healthy diet. These foods include vegetables, fruits, nuts, beans, lean meat, fish, and whole grains. Limit sodium, alcohol, and sugar. Avoid alcohol if it triggers symptoms. Stay at a healthy weight. Lose weight if you need to. Losing weight can help relieve symptoms. Manage other health problems. These problems include diabetes, high blood pressure, and high cholesterol. If you think you may have a problem with alcohol or drug use, talk to your doctor. Manage stress. Options like yoga, biofeedback, and meditation may help. Where can you learn more? Go to https://benjamin.cuaQea. org and sign in to your [employer] account. Enter F277 in the [employer] box to learn more about \"Learning About Atrial Fibrillation. \"     If you do not have an account, please click on the \"Sign Up Now\" link.  Current as of: April 29, 2021               Content Version: 13.1  © 2006-2021 [employer]. Care instructions adapted under license by [facility] ([facility]). If you have questions about a medical condition or this instruction, always ask your healthcare professional. [address] any warranty or liability for your use of this information. Patient Education        Transthoracic Echocardiogram: About This Test  What is it? An echocardiogram (also called an echo) uses sound waves to make an image of your heart. A device called a transducer sends sound waves that echo off your heart and back to the transducer. These echoes are turned into moving pictures of your heart that can be seen on a video screen. In a transthoracic echocardiogram (TTE), the transducer is moved across your chest or belly. A TTE is the most common type of echocardiogram.  Why is this test done? This test is done to check your heart health. It's used for many reasons. For example, it may be done to:  · Check a heart murmur. · Look for the cause of shortness of breath or unexplained chest pain or pressure. · Check how well your heart is pumping blood. · Check to see how well your heart valves are working. · Look for blood clots inside your heart. · Measure the size and shape of the heart's chambers. · Measure the blood pressure and speed of blood flow through the heart. How is the test done? · You may be asked to remove your clothes above your waist. You may be given a cloth or paper covering to use during the test.  · You will lie on your back or on your left side on a bed or table. · You may receive medicine through a vein (intravenously, or IV). The IV can be used to give you a contrast material. This helps your doctor get good views of your heart.   · Small pads or patches (electrodes) will be placed on the skin of your chest to record your heart rate during the test.  · A small amount of gel will be rubbed on the side of your chest to help  the sound waves. · The transducer will be pressed firmly against your chest and moved slowly back and forth. It is usually moved to different areas on your chest or belly to get specific views of your heart. · You will be asked to do several things, such as hold very still, breathe in and out very slowly, hold your breath, or lie on your left side. · When the test is over, the gel is wiped off and the electrodes are removed. What are the risks of the test?  There are no known risks from having this test.  · No electricity passes through your body during the test. There is no danger of getting an electrical shock. · You do not receive any radiation. What happens after the test?  · You will probably be able to go home right away. It depends on the reason for the test.  · You can go back to your usual activities right away. Follow-up care is a key part of your treatment and safety. Be sure to make and go to all appointments, and call your doctor if you are having problems. It's also a good idea to keep a list of the medicines you take. Ask your doctor when you can expect to have your test results. Current as of: April 29, 2021               Content Version: 13.1  © 2006-2021 [employer]. Care instructions adapted under license by [facility] ([facility]). If you have questions about a medical condition or this instruction, always ask your healthcare professional. [place] [address] any warranty or liability for your use of this information. 1. Inform Urologist that you had atrial fibrillation and developed hematuria (blood in urine) after starting Eliquis. Ask if it is safe to resume Eliquis 5 mg twice a day. If not, we will refer you to Dr. Angel Long for consideration of a Watchman LAAC Device. 2. Reduce Amiodarone to 200 mg daily.

## 2022-03-10 NOTE — PROGRESS NOTES
Cardiac Electrophysiology Consultation     Date: 3/11/2022   Reason for Consultation: Atrial fibrillation / SVT  Consult Requesting Physician: Laverne Linder. Nixon Gurrola MD  Primary Care Physician: Phoebe DRAKE     Chief Complaint:   Chief Complaint   Patient presents with    Follow-up     afib        HPI: Anika Martins is a 81 y.o. patient with a history of cardiomyopathy (2/2022), hypertension, hyperlipidemia, kidney CA managed at [facility] and GERD who presented to ED 2/2/2022 with c/o tachycardia after blood donation. He was noted to be in SVT rate 140's. He was treated with Adenosine, but continued to have episodes of Afib and SVT. He underwent EDWARD/CV on 2/4/2022 and was started on Amiodarone at 200 mg BID which was decreased to 200 mg daily on 2/18/2022. EF of 25% noted on Echo (2/2022). He was discharged on 2/7/2022 and returned to the ED on 2/9/2022 with c/o hematuria. He was found to have a right renal mass and [address] was discontinued. We spoke with his urologist Dr. Echo Gregory today and received approval to restart Eliquis. Today, he presents to office for hospital follow up to establish care for his atrial fibrillation and SVT. He states the he has been feeling weak after taking all his medications at the same time. He reports that he did have a low BP reading SBP 70-80s. He is compliant with his medications and tolerating them well. He did not take any of his medications and feels well today. He denies chest pain/pressure, tightness, edema, shortness of breath, heart racing, palpitations, lightheadedness, dizziness, syncope, presyncope,  PND or orthopnea. Past Medical History:   Diagnosis Date    Arthritis     Cancer [facility])     skin-leg    Enlarged prostate     GERD (gastroesophageal reflux disease)     Gout     History of transesophageal echocardiography (EDWARD) 02/04/2022    Mild mitral and tricuspid regurgitation.     Hyperlipidemia     Hypertension     Neuropathy     Wears glasses     Wears partial dentures     Wears partial dentures     upper      Past Surgical History:   Procedure Laterality Date    BACK SURGERY      COLONOSCOPY      COLONOSCOPY N/A 3/16/2021    COLONOSCOPY performed by Raven Augustin MD at [address] Left     ankle    TONSILLECTOMY         Allergies:  No Known Allergies    Medication:   Prior to Admission medications    Medication Sig Start Date End Date Taking? Authorizing Provider   aspirin 81 MG EC tablet Take 81 mg by mouth daily   Yes Historical Provider, MD   amiodarone (CORDARONE) 200 MG tablet Take 1 tablet by mouth daily 2/18/22  Yes Yosef Siddiqui MD   atorvastatin (LIPITOR) 10 MG tablet Take 10 mg by mouth daily   Yes Historical Provider, MD   metoprolol succinate (TOPROL XL) 50 MG extended release tablet Take 1 tablet by mouth daily 2/8/22  Yes Callie Atkinson APRN - NP   spironolactone (ALDACTONE) 25 MG tablet Take 1 tablet by mouth daily 2/8/22  Yes Onematta Needles, APRN - NP   lisinopril (PRINIVIL;ZESTRIL) 10 MG tablet Take 10 mg by mouth daily  6/5/20  Yes Historical Provider, MD       Social History:   reports that he has never smoked. He has never used smokeless tobacco. He reports previous alcohol use. He reports that he does not use drugs. Family History:  family history includes Cancer in his father, mother, and sister. Reviewed. Denies family history of sudden cardiac death, arrhythmia, premature CAD    Review of System:  Pertinent positive and negatives are in the HPI, the rest are negative. Physical Examination:  /70 (Site: Left Upper Arm, Position: Sitting)   Pulse 65   Ht 6' 4\" (1.93 m)   Wt 221 lb (100.2 kg)   SpO2 97%   BMI 26.90 kg/m²      · Constitutional: Oriented. No distress. · Head: Normocephalic and atraumatic. · Mouth/Throat: Oropharynx is clear and moist.   · Eyes: Conjunctivae normal. EOM are normal.   · Neck: Normal range of motion. Neck supple. No rigidity. No JVD present. · Cardiovascular: Normal rate, regular rhythm, S1&S2 and intact distal pulses. · Pulmonary/Chest: Bilateral respiratory sounds. No wheezes. No rhonchi. · Abdominal: Soft. Bowel sounds present. No distension, No tenderness. · Musculoskeletal: No tenderness. No edema    · Lymphadenopathy: Has no cervical adenopathy. · Neurological: Alert and oriented. Cranial nerve appears intact, No Gross deficit   · Skin: Skin is warm and dry. No rash noted. · Psychiatric: Has a normal mood, affect and behavior     Labs:  Reviewed. ECG: reviewed, Sinus rhythm, first degree A-V block  with v-rate of 60 bpm with QRS duration 94 ms. No ventricular pre-excitation, or QT prolongation. Studies:   1. Event monitor:  n/a    2. Echo: 2/2/2022  Suboptimal image quality. Patient appears to be in atrial fibrillation. Overall left ventricular systolic function appears severely reduced. Ejection fraction is visually estimated to be 20-25% with diffuse hypokinesis. Normal left ventricular wall thickness and cavity size. The right ventricle appears normal in size with moderately reduced systolic function. Mild mitral and tricuspid regurgitation. EDWARD: 2/4/22  No Left atrial or appendage clot. Mild mitral and tricuspid regurgitation.     3. Stress Test:  n/a    4. Cath: n/a    I independently reviewed the ECG, MCOT, echocardiogram, stress test, and coronary angiography/PCI results and used them for my plan of care. Procedures:  1. Successful single biphasic cardioversion to NSR on 2/4/2022. Assessment/Plan:   SVT - appears short RP tachycardia   -Noted on EKG 2/1/2022.   -Symptomatic with fatigue. Paroxysmal atrial fibrillation and flutter with 2:1 conduction.   -Noted on telemetry while hospitalized (2/2022)   -s/p EDWARD/CV 2/4/2022.   - Continue Amiodarone 200 mg daily.   -He has a CHADS Vasc is at least 3 (HTN, AGE)  · Stop ASA  · Start Eliquis 5 mg BID.  Instructed to contact his urologist if he has return of urinary bleeding. ·   Mr. Carlos Moore is doing well except for the symptoms of low blood pressure. He is in normal sinus rhythm. 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 If LVEF improves now that he is in sinus rhythm on short term amiodarone, he would prefer rhythm management with antiarrhythmics over ablation unless medications fails. Repeat echocardiogram scheduled for 4/26/22.      -We discussed treatment options including antiarrhythmics, rate control with anticoagulation, and ablation.      -We discussed progressive nature of atrial fibrillation. Treatment success decreases when AF becomes persistent and last more than 6 months.      -Antiarrhythmic therapy, side effects, benefits and alternative discussed.                  -Atrial fibrillation ablation procedure was discussed. We discussed the need for repeat procedure. On average patients may need more than one ablation procedure. · Will await results of limited echo if LVEF improved recommend stopping amiodarone and starting on Flecainide with plan for CV. -Patient would like to continue medication therapy in lieu undergoing ablation. 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 Presumed to be tachycardia mediated. -EF 20-25% by Echo 2/2022   -Continue guideline directed medical therapy with noted changes below. · Beta Blocker -Decrease to Toprol XL 25 mg daily due to hypotension. · Aldosterone Antagonist:  STOP Spironolactone today due to hypotension. -ACE/ARB/ARNI: Continue Lisinopril 10 mg daily.   -Limited Echo scheduled for 4/26/2022. Mr. Carlos Moore reports feeling poorly after taking his meds due to hypotension to the 70s with dizziness. He held all his morning meds, his BP is 122/70. I advised him to hold spironolactone for now and continue rest of medications. Get a one week BP log and f/u for further adjustments of meds.      Essential hypertension (122/70 today)    -Stable in office today.   -Reports episodes of  hypotension SBP as low at 70 mm/Hg. -Continue home monitoring of BP and if SBP continues to run less than 90 mm/hg, hold meds and f/u with PCP for further adjustment to prevent falls. Kidney cancer/Renal Mass/Prostate enlargement   -s/p ablation    -Managed at [facility]    -Urologist Dr. Saint Mealy gave approval to restart DOAC. -If bleeding returns after restarting on DOAC will refer to Dr. Ulysses Frank for consideration of Watchman. Follow up in early May. Follow up with Dr. Glenis Leo as scheduled    Thank you for allowing me to participate in the care of Nora Lima. All questions and concerns were addressed to the patient/family. Alternatives to my treatment were discussed. This note was scribed in the presence of Jeanne Barnes MD by Bc Donis RN. Physician attestation: The scribe's documentation has been prepared under my direction and has been personally reviewed by me in its entirety. I confirm that the note above reflects all work, treatment, procedures, and medical decision making performed by me.      Jeanne Barnes MD  Cardiac Electrophysiology  [facility]

## 2022-03-11 ENCOUNTER — OFFICE VISIT (OUTPATIENT)
Dept: CARDIOLOGY CLINIC | Age: 87
End: 2022-03-11
Payer: MEDICARE

## 2022-03-11 ENCOUNTER — TELEPHONE (OUTPATIENT)
Dept: CARDIOLOGY CLINIC | Age: 87
End: 2022-03-11

## 2022-03-11 VITALS
DIASTOLIC BLOOD PRESSURE: 70 MMHG | HEART RATE: 65 BPM | WEIGHT: 221 LBS | HEIGHT: 76 IN | SYSTOLIC BLOOD PRESSURE: 122 MMHG | OXYGEN SATURATION: 97 % | BODY MASS INDEX: 26.91 KG/M2

## 2022-03-11 DIAGNOSIS — I47.1 SVT (SUPRAVENTRICULAR TACHYCARDIA) (HCC): Primary | ICD-10-CM

## 2022-03-11 DIAGNOSIS — I48.0 PAF (PAROXYSMAL ATRIAL FIBRILLATION) (HCC): ICD-10-CM

## 2022-03-11 DIAGNOSIS — I10 ESSENTIAL HYPERTENSION: ICD-10-CM

## 2022-03-11 DIAGNOSIS — Z85.528 HISTORY OF KIDNEY CANCER: ICD-10-CM

## 2022-03-11 DIAGNOSIS — I42.8 NON-ISCHEMIC CARDIOMYOPATHY (HCC): ICD-10-CM

## 2022-03-11 PROCEDURE — 93000 ELECTROCARDIOGRAM COMPLETE: CPT | Performed by: INTERNAL MEDICINE

## 2022-03-11 PROCEDURE — 99215 OFFICE O/P EST HI 40 MIN: CPT | Performed by: INTERNAL MEDICINE

## 2022-03-11 PROCEDURE — 4040F PNEUMOC VAC/ADMIN/RCVD: CPT | Performed by: INTERNAL MEDICINE

## 2022-03-11 PROCEDURE — G8427 DOCREV CUR MEDS BY ELIG CLIN: HCPCS | Performed by: INTERNAL MEDICINE

## 2022-03-11 PROCEDURE — G8484 FLU IMMUNIZE NO ADMIN: HCPCS | Performed by: INTERNAL MEDICINE

## 2022-03-11 PROCEDURE — 1036F TOBACCO NON-USER: CPT | Performed by: INTERNAL MEDICINE

## 2022-03-11 PROCEDURE — 1123F ACP DISCUSS/DSCN MKR DOCD: CPT | Performed by: INTERNAL MEDICINE

## 2022-03-11 PROCEDURE — 1111F DSCHRG MED/CURRENT MED MERGE: CPT | Performed by: INTERNAL MEDICINE

## 2022-03-11 PROCEDURE — G8417 CALC BMI ABV UP PARAM F/U: HCPCS | Performed by: INTERNAL MEDICINE

## 2022-03-11 RX ORDER — METOPROLOL SUCCINATE 50 MG/1
25 TABLET, EXTENDED RELEASE ORAL DAILY
Qty: 30 TABLET | Refills: 0
Start: 2022-03-11 | End: 2022-05-13 | Stop reason: SDUPTHER

## 2022-03-11 NOTE — TELEPHONE ENCOUNTER
Spoke with Rubio Choi at Dr. Frederic Cabrera office she states the Dr Frederic Cabrera stated patient could resume his Eliquis.

## 2022-03-15 NOTE — TELEPHONE ENCOUNTER
Patient called stating that he restarted his Eliquis 2 days ago and he has blood in his urine again. Urology instructed him to call our office. Office note says refer to Dr Percy Catherine for consideration of watchman. Okay to do so or have any other recommendations?

## 2022-03-15 NOTE — TELEPHONE ENCOUNTER
Per Dr Ludin Ely That's ok to start xarelto. Called patient and let him know that it is okay to switch to Xarelto. He will call with any further bleeding and if he does have further bleeding he would be agreeable to discuss the watchman procedure.

## 2022-03-15 NOTE — TELEPHONE ENCOUNTER
I spoke to patient again and he would like to try a different medications prior to making a decision if he would like to discuss Watchman procedure.  Is it okay for him to try Xarelto 20 mg daily per his request?

## 2022-03-15 NOTE — TELEPHONE ENCOUNTER
Colton Olmstead called in again and states again he still has blood in his urine and his urologist asked that he call his cardiologist to let him know.

## 2022-03-17 ENCOUNTER — TELEPHONE (OUTPATIENT)
Dept: CARDIOLOGY CLINIC | Age: 87
End: 2022-03-17

## 2022-03-17 NOTE — TELEPHONE ENCOUNTER
Spoke with patient instructed to stop his Xarelto and contact his urologist for evaluation of his urinary bleeding. Offered to make an appointment to see Dr. Merari Hall to discuss watchman procedure. He states that he is leaning toward getting watchman but wanted to discuss everything with his PCP and perhaps get a second opinion but he will nghia if interested in schedule appointment with Dr. Merari Hall to further discus the watchman procedure.

## 2022-03-17 NOTE — TELEPHONE ENCOUNTER
Mary Red is calling in wanting to notify the doctor that he has blood in his urine with the Xarelto. Mary Red can be reached at [phone].

## 2022-03-18 NOTE — TELEPHONE ENCOUNTER
Per previous phone encounters, patient is being referred to Dr. Sudha Loya for discussion of [facility] [place]. Please return the call and offer him an appointment with Dr. Sudha Loya on a [facility] [place] office day (next available clinic day is 4/7/22).

## 2022-03-28 ENCOUNTER — TELEPHONE (OUTPATIENT)
Dept: CARDIOLOGY CLINIC | Age: 87
End: 2022-03-28

## 2022-03-28 DIAGNOSIS — Z01.818 PRE-OP TESTING: ICD-10-CM

## 2022-03-28 DIAGNOSIS — I48.0 PAROXYSMAL A-FIB (HCC): Primary | ICD-10-CM

## 2022-03-28 NOTE — TELEPHONE ENCOUNTER
Referred for Watchman consult Dr. Brandy Angulo on 3/11/2022   CHADS Vasc is at least 3 (HTN, AGE) NO CABG  hematuria and hx of renal mass/kidney CA

## 2022-03-28 NOTE — LETTER
[address] [facility] ([facility])        Dear, Nicolasa Diaz        Your Watchman procedure has been scheduled for Monday 6/6/2022 at [facility] with Dr. Ankit Nevarez MD.  Please arrive directly to the Cath Lab at 6:30 am.     You will need to make arrangements to have a responsible adult drive you home after your procedure and someone needs to stay with you for 24 hours. Procedure labs Procedure labs will need to be completed at [facility] Wednesday 6/1/2022 the week before your procedure. Please check in with Registration in the main lobby of the hospital. The test to perform [address] LAB NOT OUTPATIENT LAB. are as follows BMP, CBC, Urine test and Type and Screen. Pre-Procedure Instructions:  · You will need to FAST for at least 8 hours prior to your procedure, nothing to eat or drink after midnight. · NO caffeine the morning of your procedure. · You need to wash your body with a soap called HIBICLENS the evening before or the morning of your scheduled procedure from the neck down. A prescription has been sent to your pharmacy. If your insurance does not cover the soap, you can pick it up over the counter, ask your pharmacy for help. · Take 4 Baby Aspirin the day of the procedure. · Hold multi. Vitamins the day of procedure. · ALL other medications can be taken in the morning with sips of water  · Do not use any lotions, creams or perfume the morning of procedure. · You will be going home the same day but if unable to you may stay overnight at [facility] after your procedure so please pack an overnight bag if needed. Pre-certification  Our office will be in contact with your insurance company regarding pre-certification. If for some reason, your procedure is still pending the day before your scheduled procedure, it must be moved or delayed until we have authorization to proceed. If you have Medicare, no pre-cert is required. Co-Payment  If you have a copay or a deposit due that is your responsibility to pay at the time of service, the Cardiac Cath lab will accept your payment on the day of your procedure. Please bring with you a form of payment. Any questions regarding your copay, please contact your insurance company. Please bring your photo ID, insurance cards and copayment if you have one. (Cash, checks & credit cards are accepted)        On the day of your procedure      Please report directly to  The [address] [place] [address] Str[address] [facility] [address]. You should park in the lot directly in front of the hospital, you are able to [facility] for free from (6AM-4:30PM). As you approach the hospital you will notice that there are two entrances: The first entrance is the MAIN entrance that will be on your LEFT that has the Revolving Door, the second entrance is the [place] entrance that will be on your RIGHT. We prefer that you take the Bridge as it's closer to your destination. Once you enter the Hospital, turn to your RIGHT and walk directly to the Cath lab which will be at the very end. I will be in contact with you the week before your procedure. You may reach me at [phone] in the meantime.      Sincerely,     Carl Springer RN,  Watchman Coordinator

## 2022-04-01 ENCOUNTER — TELEPHONE (OUTPATIENT)
Dept: CARDIOLOGY CLINIC | Age: 87
End: 2022-04-01

## 2022-04-01 NOTE — TELEPHONE ENCOUNTER
Sera Stallworth,    Please reach out to patient to discuss questions he has regarding the watchman procedure.     Thanks,  Keith Newsome RN

## 2022-04-01 NOTE — TELEPHONE ENCOUNTER
Spoke with patient and spent a long time with him explaining the Watchman and A-fib. Patient is willing to come in on 4/7/22. To talk to Sparkle Rm.

## 2022-04-01 NOTE — TELEPHONE ENCOUNTER
Pt called to speak to Dr Judge Black. Claims he is not sure about doing the Watchman. Has more questions he would like to discuss.       [place]# [phone]

## 2022-04-07 NOTE — TELEPHONE ENCOUNTER
Oneil Urbina called in this morning and states that he wants to cancel his watchmen procedure today- he fell and is in a lot of pain. He is rescheduled for 2 weeks from now and stated he will call if he is still hurting.  I left a VM for Luzma Cortez

## 2022-04-12 NOTE — TELEPHONE ENCOUNTER
Spoke with patient and he confirmed he will be at the appointment to see Dr. Julian Monique on 4/21/22.

## 2022-04-20 NOTE — PROGRESS NOTES
[address]  Cardiology Consult    Luz Barnard  1935 April 21, 2022    Primary Cardiologist: Dr Sybil Peabody  Referring Physician: Dr Bentley Dunne    Reason for Referral: Left atrial appendage closure    CC: \"I am here to discuss Watchman. \"      Subjective:     History of Present Illness:    Luz Barnard is a 81 y.o. patient with a PMH significant for cardiomyopathy (2/2022), hypertension, hyperlipidemia, kidney CA managed at [facility] and GERD. He presented to the emergency room on 2/2/2022 with complaints of tachycardia after a blood donation. At that time he was noted to be in SVT with rates in the 140's. He was treated with adenosine at that time but continued to have episodes of Afib and SVT. He was seen by Dr Bentley Dunne in the office on 3/11/2022 and was started on Eliquis 5 mg BID. Patient developed hematuria on Eliquis by 3/15/2022 per patient request he wanted to try Xarelto. Xarelto was sent to his pharmacy and also noted to have hematuria with Xarelto. Patient is being referred for Left Atrial Appendage Closure with WATCHMAN device for management of stroke risk resulting from non-valvular atrial fibrillation. Based on their past history, it has been determined that they are poor candidates for long-term oral-anticoagulation, however may be tolerant of short term treatment with warfarin as necessary. Today, he is in the office accompanied by a friend to discuss Watchman. He has had to stop the anticoagulation due to hematuria. He is currently not on anticoagulation therapy. Patient denies exertional chest pain/pressure, dyspnea at rest, FARMER, PND, orthopnea, palpitations, lightheadedness, weight changes, changes in LE edema, and syncope.      Past Medical History:   has a past medical history of Arthritis, Cancer ([address] Enlarged prostate, GERD (gastroesophageal reflux disease), Gout, History of transesophageal echocardiography (EDWARD), Hyperlipidemia, Hypertension, Neuropathy, Wears glasses, Wears partial dentures, and Wears partial dentures. Surgical History:   has a past surgical history that includes Colonoscopy; back surgery; fracture surgery (Left); Tonsillectomy; and Colonoscopy (N/A, 3/16/2021). Social History:   reports that he has never smoked. He has never used smokeless tobacco. He reports previous alcohol use. He reports that he does not use drugs. Family History:  family history includes Cancer in his father, mother, and sister. Home Medications:  Were reviewed and are listed in nursing record and/or below  Prior to Admission medications    Medication Sig Start Date End Date Taking? Authorizing Provider   metoprolol succinate (TOPROL XL) 50 MG extended release tablet Take 0.5 tablets by mouth daily 3/11/22  Yes Hawa Jimenez MD   amiodarone (CORDARONE) 200 MG tablet Take 1 tablet by mouth daily 2/18/22  Yes Partha Gutierres MD   atorvastatin (LIPITOR) 10 MG tablet Take 10 mg by mouth daily   Yes Historical Provider, MD   lisinopril (PRINIVIL;ZESTRIL) 10 MG tablet Take 10 mg by mouth daily  6/5/20  Yes Historical Provider, MD        CURRENT Medications:  No current facility-administered medications for this visit. Allergies:  Patient has no known allergies. Review of Systems: All reviewed and refer to HPI  · Constitutional: no unanticipated weight loss. There's been no change in energy level, sleep pattern, or activity level. No fevers, chills. · Eyes: No visual changes or diplopia. No scleral icterus. · ENT: No Headaches, hearing loss or vertigo. No mouth sores or sore throat. · Cardiovascular: No Chest pain, tightness or discomfort.  No Shortness of breath. No Dyspnea on exertion, Orthopnea, Paroxysmal nocturnal dyspnea or breathlessness at rest.   No Palpitations.  No Syncope ('blackouts', 'faints', 'collapse') or dizziness. · Respiratory: No cough or wheezing, no sputum production. No hematemesis.     · Gastrointestinal: No abdominal pain, appetite loss, blood in stools. No change in bowel or bladder habits. · Genitourinary: No dysuria, trouble voiding, or hematuria. · Musculoskeletal:  No gait disturbance, no joint complaints. · Integumentary: No rash or pruritis. · Neurological: No headache, diplopia, change in muscle strength, numbness or tingling. · Psychiatric: No anxiety or depression. · Endocrine: No temperature intolerance. No excessive thirst, fluid intake, or urination. No tremor. · Hematologic/Lymphatic: No abnormal bruising or bleeding, blood clots or swollen lymph nodes. · Allergic/Immunologic: No nasal congestion or hives. Objective: all reveiwed      PHYSICAL EXAM:      Vitals:    04/21/22 1301   BP: 138/86   Pulse: 86   SpO2: 97%    Weight: 227 lb (103 kg)       General Appearance:  Alert, cooperative, no distress, appears stated age. Head:  Normocephalic, without obvious abnormality, atraumatic. Eyes:  Pupils equal and round. No scleral icterus. Mouth: Moist mucosa, no pharyngeal erythema. Nose: Nares normal. No drainage or sinus tenderness. Neck: Supple, symmetrical, trachea midline. No adenopathy. No tenderness/mass/nodules. No carotid bruit or elevated JVD. Lungs:   Respiratory Effort: Normal   Auscultation: Clear to auscultation bilaterally, respirations unlabored. No wheeze, rales   Chest Wall:  No tenderness or deformity. Cardiovascular:    Pulses  Palpation: normal   Ascultation: Regular rate, S1/ S2 normal. No murmur, rub, or gallop. 2+ radial and pedal pulses, symmetric  Carotid  Femoral   Abdomen and Gastrointestinal:   Soft, non-tender, bowel sounds active. Liver and Spleen  Masses   Musculoskeletal: No muscle wasting  Back  Gait   Extremities: Extremities normal, atraumatic. No cyanosis or edema. No cyanosis clubbing       Skin: Inspection and palpation performed, no rashes or lesions. Pysch: Normal mood and affect.  Alert and oriented to time place person   Neurologic: Normal gross motor and sensory exam. Labs: all labs have been reviewed      Lab Results   Component Value Date    WBC 6.4 2022    RBC 4.64 2022    HGB 12.9 2022    HCT 39.2 2022    MCV 84.5 2022    RDW 15.9 2022     2022     Lab Results   Component Value Date     2022    K 4.7 2022     2022    CO2 23 2022    BUN 20 2022    CREATININE 1.4 2022    GFRAA 58 2022    AGRATIO 1.0 2022    LABGLOM 48 2022    GLUCOSE 114 2022    PROT 7.7 2022    CALCIUM 9.3 2022    BILITOT 0.6 2022    ALKPHOS 115 2022    AST 16 2022    ALT 18 2022     No results found for: PTINR  No results found for: LABA1C  Lab Results   Component Value Date    TROPONINI <0.01 2022       Cardiac, Vascular and Imaging Data: All Personally Reviewed in Detail by Myself      EK2022 Sinus rhythm     Echocardiogram:   ECHO 2022  Suboptimal image quality. Patient appears to be in atrial fibrillation. Overall left ventricular systolic function appears severely reduced. Ejection fraction is visually estimated to be 20-25% with diffuse  hypokinesis. Normal left ventricular wall thickness and cavity size. The right ventricle appears normal in size with moderately reduced systolic  function. Mild mitral and tricuspid regurgitation. Stress Test:     Cath: Other imaging:   EDWARD 2022  No Left atrial or appendage clot. Mild mitral and tricuspid regurgitation. Assessment and Plan     Atrial Fibrillation, paroxsymal    Primary Cardiologist: Dr Marlon Maharaj  Referring Physician: Dr Tj Aguayo  Referring Reason: Hematuria    CHADSvasc is at least 4 (Age,CHF,HTN)  HASbled is at least 2    Currently not on anticoagulation. He is a poor candidate for chronic anticoagulation with his history of recurrent hematuria He would be an appropriate candidate for the watchman device.  Patient is agreeable to proceed with the [facility] procedure and instructed Rob Grimaldo, the coordinator will get in contact to facilitate scheduling. Specifically regarding risk of anticoagulation they have demonstrated:  ? History of bleeding (eg. Intracerebral, subdural, GI, retro-peritoneal)  ? Intolerance oral anticoagulation  ? Increased bleeding risk (e.g. Thrombocytopenia, anemia)  ? High risk of recurrent falls    We have discussed their unique stroke and bleeding risk both on and off oral-anticoagulation, and the rationale for this referral.  Based on both stroke and bleeding risk, a shared decision has been made to pursue closure of the left atrial appendage as an alternative to oral anticoagulant therapy for stroke prophylaxis and to reduce their long term risk of incidence of bleeding. Discussed Watchman LAAC at length with patient, heart model, device model and video. We gave educational materials. We discussed pre-procedure workup, timing of restarting AC, AC length, procedure and post procedure follow up/management. No Iodine Allergy. No Nickel/Titanium Allergy. He/She is agreeable to short term AC, proceeding with EDWARD, 2nd opinion/shared decision making  and will follow up with me post workup to discussing timing of the procedure. I had a detail discussion with the patient and family regarding the risk and benefit of the procedures. I explained to them the details of the procedure. I explained to them that the procedure will be performed under general anesthesia. I explained any risk of bleeding, pericardial effusion and tamponade, perforation of the vessel, stroke, myocardial infarction or death. The patient and family understood the risk and benefits and the details of procedure and would like to go ahead with the procedure. The patient gave informed consent. All questions and concerns answered at this time. Confirmed per patient before leaving the office.      I have also asked her to call the office and speak with the [facility] Coordinator with any questions or concerns moving forward. Thank you for allowing us to participate in the care of Leo Tariq. Please do not hesitate to contact me if you have any questions. Silvio Armando MD, MPH    [place] [address] [address]  Ph: [phone]  Fax: [phone]    This note was scribed in the presence of Dr Luis Miguel Greenwood, by Ame Gordillo RN  Physician Attestation:  The scribes documentation has been prepared under my direction and personally reviewed by me in its entirety. I confirm that the note above accurately reflects all work, treatment, procedures, and medical decision making performed by me.

## 2022-04-21 ENCOUNTER — OFFICE VISIT (OUTPATIENT)
Dept: CARDIOLOGY CLINIC | Age: 87
End: 2022-04-21
Payer: MEDICARE

## 2022-04-21 VITALS
HEART RATE: 86 BPM | WEIGHT: 227 LBS | SYSTOLIC BLOOD PRESSURE: 138 MMHG | DIASTOLIC BLOOD PRESSURE: 86 MMHG | OXYGEN SATURATION: 97 % | BODY MASS INDEX: 27.64 KG/M2 | HEIGHT: 76 IN

## 2022-04-21 DIAGNOSIS — I48.0 PAF (PAROXYSMAL ATRIAL FIBRILLATION) (HCC): Primary | ICD-10-CM

## 2022-04-21 PROCEDURE — 93000 ELECTROCARDIOGRAM COMPLETE: CPT | Performed by: INTERNAL MEDICINE

## 2022-04-21 PROCEDURE — 1036F TOBACCO NON-USER: CPT | Performed by: INTERNAL MEDICINE

## 2022-04-21 PROCEDURE — 1123F ACP DISCUSS/DSCN MKR DOCD: CPT | Performed by: INTERNAL MEDICINE

## 2022-04-21 PROCEDURE — 99215 OFFICE O/P EST HI 40 MIN: CPT | Performed by: INTERNAL MEDICINE

## 2022-04-21 PROCEDURE — G8417 CALC BMI ABV UP PARAM F/U: HCPCS | Performed by: INTERNAL MEDICINE

## 2022-04-21 PROCEDURE — 4040F PNEUMOC VAC/ADMIN/RCVD: CPT | Performed by: INTERNAL MEDICINE

## 2022-04-21 PROCEDURE — G8427 DOCREV CUR MEDS BY ELIG CLIN: HCPCS | Performed by: INTERNAL MEDICINE

## 2022-04-21 NOTE — TELEPHONE ENCOUNTER
Spoke with patient today regarding Watchman procedure. Patient was shown video on Watchman and given Educational material.  Patient states interest and would like to proceed. Will set up appointment with Dr. Gianni Cabrera for a shared decision.

## 2022-04-26 ENCOUNTER — HOSPITAL ENCOUNTER (OUTPATIENT)
Dept: NON INVASIVE DIAGNOSTICS | Age: 87
Discharge: HOME | End: 2022-04-26
Payer: MEDICARE

## 2022-04-26 DIAGNOSIS — I42.8 NON-ISCHEMIC CARDIOMYOPATHY (HCC): ICD-10-CM

## 2022-04-26 PROCEDURE — C8929 TTE W OR WO FOL WCON,DOPPLER: HCPCS

## 2022-04-26 PROCEDURE — 6360000004 HC RX CONTRAST MEDICATION: Performed by: INTERNAL MEDICINE

## 2022-04-26 RX ADMIN — PERFLUTREN 1.65 MG: 6.52 INJECTION, SUSPENSION INTRAVENOUS at 10:32

## 2022-04-27 NOTE — PROGRESS NOTES
[facility]  Cardiology  Note      Mando Bermudez  1935, 81 y.o.      CC: \" I had an episode of Afib. \"               Alpheus Common DRAKE:      HPI:   This is a 81 y.o. male with a PMH significant for SVT, Afib, cardiomyopathy (2/2022), hypertension, hyperlipidemia, kidney CA managed at [facility] and GERD. He presented to [facility] 2/2/22 with c/o tachycardia after blood donation. He was noted to be in SVT rate 140's. He was treated with adenosine, but continued to have episodes of Afib and SVT. He underwent EDWARD/CV on 2/4/22. EF of 25% noted on Echo (2/2022). He was discharged on 2/7 and returned to the ED on 2/9 with c/o hematuria. He was found to have a right renal mass and [address] was discontinued. He was evaluated by Dr. April Gonzalez on 3/15/22 when he agreed to retrial [address]. Unfortunately, hematuria returned. He later followed up with Dr. Ed Ruffin for consideration of Watchman device and is currently being worked up for procedure. EF improved to 60% on most recent Echo (4/2022). Patient returns today in follow up for management of cardiomyopathy. States he had one episode of Afib and relates this to stress. He has no new cardiac complaints today. States he feels well and taking all medication as prescribed. Today, specifically Patient denies any chest pain, pressure, tightness, nausea, vomiting, diaphoresis, SOB at rest / FARMER, palpitations, heart racing, dizziness/lightheadedness, cough, orthopnea, PND, LE edema, syncope or changes in bowel/bladder habits. Past Medical History:   Diagnosis Date    Arthritis     Cancer [facility])     skin-leg    Enlarged prostate     GERD (gastroesophageal reflux disease)     Gout     History of transesophageal echocardiography (EDWARD) 02/04/2022    Mild mitral and tricuspid regurgitation.     Hyperlipidemia     Hypertension     Neuropathy     Wears glasses     Wears partial dentures     Wears partial dentures     upper      Past Surgical History:   Procedure Laterality Date    BACK SURGERY      COLONOSCOPY      COLONOSCOPY N/A 3/16/2021    COLONOSCOPY performed by Rodo Ascencio MD at [address] Left     ankle    TONSILLECTOMY        Family History   Problem Relation Age of Onset    Cancer Mother     Cancer Father     Cancer Sister       Social History     Tobacco Use    Smoking status: Never Smoker    Smokeless tobacco: Never Used   Vaping Use    Vaping Use: Never used   Substance Use Topics    Alcohol use: Not Currently    Drug use: Never     No Known Allergies      Review of Systems -   Constitutional: Negative for weight gain/loss; malaise, fever  Respiratory: Negative for Asthma;  cough and hemoptysis  Cardiovascular: Negative for palpitations,dizziness   Gastrointestinal: Negative for abd.pain; constipation/diarrhea;    Genitourinary: Negative for stones; hematuria; frequency hesitancy  Integumentt: Negative for rash or pruritis  Hematologic/lymphatic: Negative for blood dyscrasia; leukemia/lymphoma  Musculoskeletal: Negative for Connective tissue disease  Neurological:  Negative for Seizure   Behavioral/Psych:Negative for Bipolar disorder, Schizophrenia; Dementia  Endocrine: negative for thyroid, parathyroid disease    Physical Examination:    /70   Pulse 67   Ht 6' 4\" (1.93 m)   Wt 227 lb 6.4 oz (103.1 kg)   SpO2 95%   BMI 27.68 kg/m²    HEENT:  Face: Atraumatic, Conjunctiva: Pink; non icteric,  Mucous Memb:  Moist, No thyromegaly or Lymphadenopathy  Respiratory:  Resp Assessment: normal, Resp Auscultation: clear  Cardiovascular: Auscultation: nl S1 & S2, Palpation:  Nl PMI;  No heaves or thrills, JVP:  normal  Abdomen: Soft, non-tender, Normal bowel sounds,  No organomegaly  Extremities: No Cyanosis or Clubbing  Neurological: Oriented to time, place, and person, Non-anxious  Psychiatric: Normal mood and affect  Skin: Warm and dry,  No rash seen     Outpatient Medications Marked as Taking for the 4/28/22 encounter (Office Visit) with Deborah Mandujano MD   Medication Sig Dispense Refill    aspirin 81 MG EC tablet Take 81 mg by mouth daily      esomeprazole (NEXIUM) 40 MG delayed release capsule TAKE 1 CAPSULE (40 MG TOTAL) BY MOUTH DAILY.  metoprolol succinate (TOPROL XL) 50 MG extended release tablet Take 0.5 tablets by mouth daily 30 tablet 0    amiodarone (CORDARONE) 200 MG tablet Take 1 tablet by mouth daily 30 tablet 5    atorvastatin (LIPITOR) 10 MG tablet Take 10 mg by mouth daily      lisinopril (PRINIVIL;ZESTRIL) 10 MG tablet Take 10 mg by mouth daily            EK22, sinus bradycardia rate 58  22, sinus rhythm, 1st degree AVB rate 63      ECHO: 22   Suboptimal image quality. Patient appears to be in atrial fibrillation. Overall left ventricular systolic function appears severely reduced. Ejection fraction is visually estimated to be 20-25% with diffuse   hypokinesis. Normal left ventricular wall thickness and cavity size. The right ventricle appears normal in size with moderately reduced systolic   function. Mild mitral and tricuspid regurgitation. EDWARD: 22  No Left atrial or appendage clot. Mild mitral and tricuspid regurgitation. CV: 22  Indication: AF  Anesthesia: Brevital  20  Single synchronized biphasic cardioversion. Successful  Converted to SR. Echo: 22  Normal LV size,wall thickness and motion. EF 60%. Normal diastolic function. Left atrium is of normal size. Normal right ventricular size and function. Mild Mitral and Tricuspid regurgitation. ASSESSMENT AND PLAN:      SVT  Managed per EP  Noted on EKG 22 with rates in 130-140's range  He was symptomatic with fatigue  Continue Toprol XL for rate control     Atrial fibrillation  Paroxysmal   Noted on telemetry while hospitalized (2022) and treated with Cardizem gtt   S/p EDWARD/CV 22; continue amiodarone 200 mg daily    CHADS Vasc is at least 3 (HTN, AGE);  Intolerant to [address] [facility] ([facility] and Xarelto) d/t hematuria  Continue Toprol XL for rate control   Had a recurrent bleed with NOACs: Being worked up for [employer] device . In the meantime is only on aspirin    Cardiomyopathy  EF 20-25% by Echo 2/2022; Continue Toprol XL, spironolactone and lisinopril     Essential hypertension   BP is controlled  Continue lisinopril   Risk factor modifications     Kidney cancer/Renal Mass/Prostate enlargement  S/p ablation   Managed at [facility]     Return to the office in 6 months     Thank you very much for allowing me to participate in the care of your patient. Please do not hesitate to contact me if you have any questions. Sincerely,    Charles Nolan M.D  [facility], [address] [place] [address]  Ph: [phone]  Fax: [phone]    This note was scribed in the presence of Dr. Charles Nolan MD by Anamaria Kuhn  Physician Attestation:  The scribes documentation has been prepared under my direction and personally reviewed by me in its entirety. I confirm that the note above accurately reflects all work, treatment, procedures, and medical decision making performed by me.

## 2022-04-28 ENCOUNTER — OFFICE VISIT (OUTPATIENT)
Dept: CARDIOLOGY CLINIC | Age: 87
End: 2022-04-28
Payer: MEDICARE

## 2022-04-28 VITALS
DIASTOLIC BLOOD PRESSURE: 70 MMHG | HEIGHT: 76 IN | OXYGEN SATURATION: 95 % | HEART RATE: 67 BPM | WEIGHT: 227.4 LBS | BODY MASS INDEX: 27.69 KG/M2 | SYSTOLIC BLOOD PRESSURE: 110 MMHG

## 2022-04-28 DIAGNOSIS — I48.0 PAF (PAROXYSMAL ATRIAL FIBRILLATION) (HCC): ICD-10-CM

## 2022-04-28 DIAGNOSIS — I47.1 SVT (SUPRAVENTRICULAR TACHYCARDIA) (HCC): Primary | ICD-10-CM

## 2022-04-28 DIAGNOSIS — N28.89 RENAL MASS: ICD-10-CM

## 2022-04-28 DIAGNOSIS — I10 ESSENTIAL HYPERTENSION: ICD-10-CM

## 2022-04-28 DIAGNOSIS — I42.8 NON-ISCHEMIC CARDIOMYOPATHY (HCC): ICD-10-CM

## 2022-04-28 PROCEDURE — 99214 OFFICE O/P EST MOD 30 MIN: CPT | Performed by: INTERNAL MEDICINE

## 2022-04-28 PROCEDURE — 1123F ACP DISCUSS/DSCN MKR DOCD: CPT | Performed by: INTERNAL MEDICINE

## 2022-04-28 PROCEDURE — G8417 CALC BMI ABV UP PARAM F/U: HCPCS | Performed by: INTERNAL MEDICINE

## 2022-04-28 PROCEDURE — 93000 ELECTROCARDIOGRAM COMPLETE: CPT | Performed by: INTERNAL MEDICINE

## 2022-04-28 PROCEDURE — 1036F TOBACCO NON-USER: CPT | Performed by: INTERNAL MEDICINE

## 2022-04-28 PROCEDURE — G8427 DOCREV CUR MEDS BY ELIG CLIN: HCPCS | Performed by: INTERNAL MEDICINE

## 2022-04-28 PROCEDURE — 4040F PNEUMOC VAC/ADMIN/RCVD: CPT | Performed by: INTERNAL MEDICINE

## 2022-04-28 RX ORDER — ASPIRIN 81 MG/1
81 TABLET ORAL DAILY
COMMUNITY

## 2022-04-28 RX ORDER — ESOMEPRAZOLE MAGNESIUM 40 MG/1
CAPSULE, DELAYED RELEASE ORAL
COMMUNITY
Start: 2022-02-11

## 2022-04-28 NOTE — PATIENT INSTRUCTIONS
Patient Education        A Healthy Heart: Care Instructions  Your Care Instructions     Coronary artery disease, also called heart disease, occurs when a substance called plaque builds up in the vessels that supply oxygen-rich blood to your heart muscle. This can narrow the blood vessels and reduce blood flow. A heart attack happens when blood flow is completely blocked. A high-fat diet, smoking,and other factors increase the risk of heart disease. Your doctor has found that you have a chance of having heart disease. You can do lots of things to keep your heart healthy. It may not be easy, but you can change your diet, exercise more, and quit smoking. These steps really work tolower your chance of heart disease. Follow-up care is a key part of your treatment and safety. Be sure to make and go to all appointments, and call your doctor if you are having problems. It's also a good idea to know your test results and keep alist of the medicines you take. How can you care for yourself at home? Diet     Use less salt when you cook and eat. This helps lower your blood pressure. Taste food before salting. Add only a little salt when you think you need it. With time, your taste buds will adjust to less salt.      Eat fewer snack items, fast foods, canned soups, and other high-salt, high-fat, processed foods.      Read food labels and try to avoid saturated and trans fats. They increase your risk of heart disease by raising cholesterol levels.      Limit the amount of solid fat-butter, margarine, and shortening-you eat. Use olive, peanut, or canola oil when you cook. Bake, broil, and steam foods instead of frying them.      Eat a variety of fruit and vegetables every day. Dark green, deep orange, red, or yellow fruits and vegetables are especially good for you. Examples include spinach, carrots, peaches, and berries.      Foods high in fiber can reduce your cholesterol and provide important vitamins and minerals. High-fiber foods include whole-grain cereals and breads, oatmeal, beans, brown rice, citrus fruits, and apples.      Eat lean proteins. Heart-healthy proteins include seafood, lean meats and poultry, eggs, beans, peas, nuts, seeds, and soy products.      Limit drinks and foods with added sugar. These include candy, desserts, and soda pop. Lifestyle changes     If your doctor recommends it, get more exercise. Walking is a good choice. Bit by bit, increase the amount you walk every day. Try for at least 30 minutes on most days of the week. You also may want to swim, bike, or do other activities.      Do not smoke. If you need help quitting, talk to your doctor about stop-smoking programs and medicines. These can increase your chances of quitting for good. Quitting smoking may be the most important step you can take to protect your heart. It is never too late to quit.      Limit alcohol to 2 drinks a day for men and 1 drink a day for women. Too much alcohol can cause health problems.      Manage other health problems such as diabetes, high blood pressure, and high cholesterol. If you think you may have a problem with alcohol or drug use, talk to your doctor. Medicines     Take your medicines exactly as prescribed. Call your doctor if you think you are having a problem with your medicine.      If your doctor recommends aspirin, take the amount directed each day. Make sure you take aspirin and not another kind of pain reliever, such as acetaminophen (Tylenol). When should you call for help? Call 911 if you have symptoms of a heart attack. These may include:     Chest pain or pressure, or a strange feeling in the chest.      Sweating.      Shortness of breath.      Pain, pressure, or a strange feeling in the back, neck, jaw, or upper belly or in one or both shoulders or arms.      Lightheadedness or sudden weakness.      A fast or irregular heartbeat.    After you call 911, the  may tell you to chew 1 adult-strength or 2 to 4 low-doseaspirin. Wait for an ambulance. Do not try to drive yourself. Watch closely for changes in your health, and be sure to contact your doctor ifyou have any problems. Where can you learn more? Go to https://chpepiceweb.[employer]. org and sign in to your [employer] account. Enter Y307 in the [employer] box to learn more about \"A Healthy Heart: Care Instructions. \"     If you do not have an account, please click on the \"Sign Up Now\" link. Current as of: January 10, 2022               Content Version: 13.2  © 2006-2022 Healthwise, [employer]. Care instructions adapted under license by [facility] ([facility]). If you have questions about a medical condition or this instruction, always ask your healthcare professional. [address] any warranty or liability for your use of this information.

## 2022-05-06 NOTE — PROGRESS NOTES
Cardiac Electrophysiology Consultation     Date: 5/11/2022   Reason for Consultation: Atrial fibrillation / SVT  Consult Requesting Physician: Connor Sanz. Windy Jimenez MD  Primary Care Physician: Conchis DRAKE     Chief Complaint:   Chief Complaint   Patient presents with    Follow-up     SVT        HPI: Delmer Cortez is a 81 y.o. patient with a history of cardiomyopathy (2/2022), hypertension, hyperlipidemia, kidney CA managed at [facility] and GERD who presented to ED 2/2/2022 with c/o tachycardia after blood donation. He was noted to be in SVT rate 140's. He was treated with Adenosine, but continued to have episodes of Afib and SVT. He underwent EDWARD/CV on 2/4/2022 and was started on Amiodarone at 200 mg BID which was decreased to 200 mg daily on 2/18/2022. EF of 25% noted on Echo (2/2022). He was discharged on 2/7/2022 and returned to the ED on 2/9/2022 with c/o hematuria. He was found to have a right renal mass and [address] was discontinued. We spoke with his urologist Dr. Timbo Martinez today and received approval to restart Eliquis. He was seen in office on 3/13/2022 for hospital follow up to establish care for his atrial fibrillation and SVT. EKG showed SR. He reported that  the he had been feeling weak after taking all his medications at the same time and report home BP readings with SBP in the 70-80's. He was instructed to space medications and continue home monitoring of BP and if SBP continues to run less than 90 mm/hg, hold meds and f/u with PCP for further adjustment to prevent falls. He reported that he was previously on Eliquis and had issues with urinary bleeding. Approval was received from his urologist to retrial on [address] and he was started on Xarelto 20 mg daily. He had a return of urinary bleeding and Xarelto was discontinued. He is in the process of being worked up for [facility] device. Repeat echo showed LVEF improved to 60%. Echo completed on 4/26/2022 showed improvement to LVEF up to 60 %.     Interval history: Today, he presents to office for follow up for management of his atrial fibrillation and SVT. He feels that his energy has significantly improves. This correlates with LVEF recovery. No palpitations. No syncope or presyncope. Past Medical History:   Diagnosis Date    Arthritis     Cancer [facility])     skin-leg    Enlarged prostate     GERD (gastroesophageal reflux disease)     Gout     History of transesophageal echocardiography (EDWARD) 02/04/2022    Mild mitral and tricuspid regurgitation.  Hyperlipidemia     Hypertension     Neuropathy     Wears glasses     Wears partial dentures     Wears partial dentures     upper      Past Surgical History:   Procedure Laterality Date    BACK SURGERY      COLONOSCOPY      COLONOSCOPY N/A 3/16/2021    COLONOSCOPY performed by Caridad Geller MD at [address] Left     ankle    TONSILLECTOMY         Allergies:  No Known Allergies    Medication:   Prior to Admission medications    Medication Sig Start Date End Date Taking? Authorizing Provider   aspirin 81 MG EC tablet Take 81 mg by mouth daily   Yes Historical Provider, MD   esomeprazole (NEXIUM) 40 MG delayed release capsule TAKE 1 CAPSULE (40 MG TOTAL) BY MOUTH DAILY. 2/11/22  Yes Historical Provider, MD   metoprolol succinate (TOPROL XL) 50 MG extended release tablet Take 0.5 tablets by mouth daily 3/11/22  Yes Juancarlos Marrero MD   amiodarone (CORDARONE) 200 MG tablet Take 1 tablet by mouth daily 2/18/22  Yes Anabel Paula MD   atorvastatin (LIPITOR) 10 MG tablet Take 10 mg by mouth daily   Yes Historical Provider, MD   lisinopril (PRINIVIL;ZESTRIL) 10 MG tablet Take 10 mg by mouth daily  6/5/20  Yes Historical Provider, MD       Social History:   reports that he has never smoked. He has never used smokeless tobacco. He reports previous alcohol use. He reports that he does not use drugs. Family History:  family history includes Cancer in his father, mother, and sister. Reviewed. Denies family history of sudden cardiac death, arrhythmia, premature CAD    Review of System:  Pertinent positive and negatives are in the HPI, the rest are negative. Physical Examination:  /68   Pulse 71   Ht 6' 4\" (1.93 m)   Wt 222 lb (100.7 kg)   SpO2 95%   BMI 27.02 kg/m²      · Constitutional: Oriented. No distress. · Head: Normocephalic and atraumatic. · Mouth/Throat: Oropharynx is clear and moist.   · Eyes: Conjunctivae normal. EOM are normal.   · Neck: Normal range of motion. Neck supple. No rigidity. No JVD present. · Cardiovascular: Normal rate, regular rhythm, S1&S2 and intact distal pulses. · Pulmonary/Chest: Bilateral respiratory sounds. No wheezes. No rhonchi. · Abdominal: Soft. Bowel sounds present. No distension, No tenderness. · Musculoskeletal: No tenderness. No edema    · Lymphadenopathy: Has no cervical adenopathy. · Neurological: Alert and oriented. Cranial nerve appears intact, No Gross deficit   · Skin: Skin is warm and dry. No rash noted. · Psychiatric: Has a normal mood, affect and behavior     Labs:  Reviewed. ECG: reviewed, Sinus rhythm, first degree A-V block  with v-rate of 60 bpm with QRS duration 94 ms. No ventricular pre-excitation, or QT prolongation. Studies:   1. Event monitor:    n/a    2. Limited Echo 4/26/2022  Summary  Normal LV size,wall thickness and motion. EF ~ 60%. Normal diastolic function. Left atrium is of normal size. Normal right ventricular size and function. Mild Mitral and Tricuspid regurgitation. Echo: 2/2/2022  Suboptimal image quality. Patient appears to be in atrial fibrillation. Overall left ventricular systolic function appears severely reduced. Ejection fraction is visually estimated to be 20-25% with diffuse hypokinesis. Normal left ventricular wall thickness and cavity size. The right ventricle appears normal in size with moderately reduced systolic function.   Mild mitral and tricuspid regurgitation. EDWARD: 2/4/2022  No Left atrial or appendage clot. Mild mitral and tricuspid regurgitation.     3. Stress Test:  n/a    4. Cath: n/a    I independently reviewed the ECG, MCOT, echocardiogram, stress test, and coronary angiography/PCI results and used them for my plan of care. Procedures:  1. Successful single biphasic cardioversion to NSR on 2/4/2022. Assessment/Plan:   SVT - appears short RP tachycardia   -Noted on EKG 2/1/2022.   -Symptomatic with fatigue. Paroxysmal atrial fibrillation and flutter with 2:1 conduction. -EKG today shows SR.   -Reports that he feels better in SR reports having more energy and stamina. He reports the medications he is on does make him feel a bit tired. -Reports fatigue on his medications    -Noted on telemetry while hospitalized (2/2022)   -s/p EDWARD/CV 2/4/2022. · Stop Amiodarone. · Flecainide 100 mg BID. -LVEF up to 60 % with restoration of SR.   -He has a CHADS Vasc is at least 3 (HTN, AGE)   -He was unable to tolerate Xarelto and Elqiuis and is currently being worked up for [place] device. Mr. Kylah Hernandez repeat echocardiogram showed LVEF recovery to normal in NSR on amiodarone suggestive of tachycardia mediated cardiomyopathy. He feels a lot more energetic. As per previous plan, he prefers medical management (AAD) if that fails then would undergo ablation. As such, we will stop amiodarone and start flecainide 100 mg bid. He does not tolerate anticoagulation due to  bleeding which has prompted him to stop. He does not want warfarin. Will refer him for DANDRE occluder device. If he wishes to undergo the ablation in the future we will have him undergo after the watchman while on anticoagulation.      If he has a return on atrial fibrillation while on AAD it would be recommended that he undergo ablation to prevent decreased in LVEF which he was noted to have when in afib in the past.     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 -EF 20-25% by Echo 2/2022. -LVEF up to 60 % per Echo 4/26/2022 with restoration of SR.   -Continue guideline directed medical therapy.  -Beta Blocker - Toprol XL 25 mg daily.  -Aldosterone Antagonist:  Not on due to hypotension. -ACE/ARB/ARNI:  Lisinopril 10 mg daily. Essential hypertension     -Stable. -Continue current medical management. Kidney cancer/Renal Mass/Prostate enlargement   -s/p ablation      -Managed at [facility]    -Following with Urologist Dr. Radha Li.   -unable to tolerate [address] urinary bleeding noted on Eliquis and Xarelto. Currently being worked up for [employer]. Follow ups: Follow up with myself in 6 months. Continue following with Damien Lancaster for [employer]. Continue routine follow up with Dr. Aurelio Geller as scheduled. Thank you for allowing me to participate in the care of Gina Wiggins. All questions and concerns were addressed to the patient/family. Alternatives to my treatment were discussed. This note was scribed in the presence of Court Hillman MD by Kaylene Callejas RN. Physician attestation: The scribe's documentation has been prepared under my direction and has been personally reviewed by me in its entirety. I confirm that the note above reflects all work, treatment, procedures, and medical decision making performed by me.        Court Hillman MD  Cardiac Electrophysiology  [facility]

## 2022-05-10 NOTE — PROGRESS NOTES
[address]      Cardiology Consult    Soila Bennett  1935    May 11, 2022    Primary Cardiologist: Dr. Dr. Shira Sarmiento. [facility]  Electrophysiologist: Dr. Radha Nino   Referring Physician: Dr. Yao Cordero  Reason for Referral: Shared decision Susanne gustafson     CC: \"I feel great. \"     HPI:  The patient is 81 y.o. male with a past medical history significant for CMP, HTN, HLD, kidney CA, GERD. 2/2022 presented to the ER after complaints of tachycardia with blood donation. Reported he was in SVT, 140's treated with adenosine but continued with episodes of both SVT and AFIB. He established with Dr. Sandi Salazar 3/11/22 and was started on Eliquis 5 mg BID, developed hematuria, switched to Xarelto with reported hematuria as well. Called in to report that he fell 4/7/22DrShantel Carlin his primary cardiologist referred him to Dr. Yao Cordero for consideration of the [facility] device and presents today for shared decision making. Today, the patient denies any new or recurrent cardiac sounding complaints. He states he is feeling great. He has remained off of [address] and back on ASA 81 mg daily. Per patient he had a cystoscopy that did not find a cause for the hematuria. No further bleeding once discontinued and aspirin resumed. Patient denies exertional chest pain/pressure, dyspnea at rest, FARMER, PND, orthopnea, palpitations, lightheadedness, weight changes, changes in LE edema, and syncope. He admits to medical therapy compliance and tolerating. Past Medical History:   Diagnosis Date    Arthritis     Cancer [facility])     skin-leg    Enlarged prostate     GERD (gastroesophageal reflux disease)     Gout     History of transesophageal echocardiography (EDWARD) 02/04/2022    Mild mitral and tricuspid regurgitation.     Hyperlipidemia     Hypertension     Neuropathy     Wears glasses     Wears partial dentures     Wears partial dentures     upper     Past Surgical History:   Procedure Laterality Date    BACK SURGERY      COLONOSCOPY      COLONOSCOPY N/A 3/16/2021    COLONOSCOPY performed by Madison Kidd MD at [address] Left     ankle    TONSILLECTOMY       Family History   Problem Relation Age of Onset    Cancer Mother     Cancer Father     Cancer Sister      Social History     Tobacco Use    Smoking status: Never Smoker    Smokeless tobacco: Never Used   Vaping Use    Vaping Use: Never used   Substance Use Topics    Alcohol use: Not Currently    Drug use: Never       No Known Allergies  Current Outpatient Medications   Medication Sig Dispense Refill    aspirin 81 MG EC tablet Take 81 mg by mouth daily      esomeprazole (NEXIUM) 40 MG delayed release capsule TAKE 1 CAPSULE (40 MG TOTAL) BY MOUTH DAILY.  metoprolol succinate (TOPROL XL) 50 MG extended release tablet Take 0.5 tablets by mouth daily 30 tablet 0    amiodarone (CORDARONE) 200 MG tablet Take 1 tablet by mouth daily 30 tablet 5    atorvastatin (LIPITOR) 10 MG tablet Take 10 mg by mouth daily      lisinopril (PRINIVIL;ZESTRIL) 10 MG tablet Take 10 mg by mouth daily        No current facility-administered medications for this visit. Review of Systems:  · Constitutional: no unanticipated weight loss. There's been no change in energy level, sleep pattern, or activity level. No fevers, chills. · Eyes: No visual changes or diplopia. No scleral icterus. · ENT: No Headaches, hearing loss or vertigo. No mouth sores or sore throat. · Cardiovascular: as reviewed in HPI  · Respiratory: No cough or wheezing, no sputum production. No hematemesis. · Gastrointestinal: No abdominal pain, appetite loss, blood in stools. No change in bowel or bladder habits. · Genitourinary: No dysuria, trouble voiding, or hematuria. · Musculoskeletal:  No gait disturbance, no joint complaints. · Integumentary: No rash or pruritis. · Neurological: No headache, diplopia, change in muscle strength, numbness or tingling.    · Psychiatric: No anxiety or depression. · Endocrine: No temperature intolerance. No excessive thirst, fluid intake, or urination. No tremor. · Hematologic/Lymphatic: No abnormal bruising or bleeding, blood clots or swollen lymph nodes. · Allergic/Immunologic: No nasal congestion or hives. Physical Exam:   /68   Pulse 64   Ht 6' 4\" (1.93 m)   Wt 222 lb (100.7 kg)   SpO2 96%   BMI 27.02 kg/m²   Wt Readings from Last 3 Encounters:   05/11/22 222 lb (100.7 kg)   04/28/22 227 lb 6.4 oz (103.1 kg)   04/21/22 227 lb (103 kg)     Constitutional: He is oriented to person, place, and time. He appears well-developed and well-nourished. In no acute distress. Head: Normocephalic and atraumatic. Pupils equal and round. Neck: Neck supple. No JVP or carotid bruit appreciated. No mass and no thyromegaly present. No lymphadenopathy present. Cardiovascular: Normal rate. Normal heart sounds. Exam reveals no gallop and no friction rub. No murmur heard. Pulmonary/Chest: Effort normal and breath sounds normal. No respiratory distress. He has no wheezes, rhonchi or rales. Abdominal: Soft, non-tender. Bowel sounds are normal. He exhibits no organomegaly, mass or bruit. Extremities: No edema, cyanosis, or clubbing. Pulses are 2+ radial/dorsalis pedis/posterior tibial/carotid bilaterally. Neurological: No gross cranial nerve deficit. Coordination normal.   Skin: Skin is warm and dry. There is no rash or diaphoresis. Psychiatric: He has a normal mood and affect.  His speech is normal and behavior is normal.     Lab Review:     No results found for: TRIG, HDL, LDLCALC, LDLDIRECT, LABVLDL  Lab Results   Component Value Date    WBC 6.4 02/11/2022     Lab Results   Component Value Date     02/11/2022       Lab Results   Component Value Date    HGB 12.9 02/11/2022    HCT 39.2 02/11/2022      No results found for: MG   No results found for: PHOS    Lab Results   Component Value Date    TROPONINI <0.01 02/09/2022    TROPONINI <0.01 02/01/2022       No results found for: LABA1C   No results found for: TRIG, HDL, LDLCALC, LDLDIRECT, LABVLDL   No results found for: TSH   No components found for: B12   No results found for: FOLATE  No results found for: IRON    No components found for: PERCENTFE   No results found for: TIBC    No results found for: FERRITIN    Lab Results   Component Value Date    BUN 20 02/11/2022    CREATININE 1.4 02/11/2022       EKG Interpretation: 5/11/22: SR, ~65 bpm.     Image Review:     Echo: 2/2/22   Suboptimal image quality. Patient appears to be in atrial fibrillation. Overall left ventricular systolic function appears severely reduced. Ejection fraction is visually estimated to be 20-25% with diffuse   hypokinesis. Normal left ventricular wall thickness and cavity size. The right ventricle appears normal in size with moderately reduced systolic   function. Mild mitral and tricuspid regurgitation    EDWARD: 2/4/22   No Left atrial or appendage clot. Mild mitral and tricuspid regurgitation. Echo: 4/26/22   Normal LV size,wall thickness and motion. EF    60%. Normal diastolic  function. Left atrium is of normal size. Normal right ventricular size and function. Mild Mitral and Tricuspid regurgitation. Assessment/Plan:     Atrial fibrillation, paroxsymal       Presents today per Dr. Ebonie Sexton for shared decision making for possible candidacy of the [facility] device. -2/2022 tachycardia s/p blood transfusion  -ER noted SVT, ~140's, treated with adenosine but cntinued with episodes of SVT and AFIB. -2/2/22 Echo revealed LVEF 20-25% in AFIB. -2/4/22 s/p EDWARD/DCCV then started on amiodarone 200 mg BID, then 200 mg daily 2/18/22.   -2/9/22 returned to ER with hematuria, found to have R renal mass. Urologist Dr. Lou Lynn approved resuming [address] and Eliquis was resumed 3/13/22 per Dr. Paige King in f/u.  ASA discontinued.   -Called in on 3/15/22 with hematuria on Eliquis again and switched to Xarelto 20 mg daily and called on 3/17/22 with continued hematuria. -called in to report that he fell 4/7/22  -also hx of off/on anemia-TCH 5/3/22 H/H 13.9/43.2 with normal diff.    -5/3/22 TCH BUN/Cr 22/1.33.    -Today, he reports that he is feeling great with no reported cardiac symptoms. -he states he is off of Xarelto and Eliquis and has resumes ASA 81 mg daily with no recurrent hematuria. -reports no etiology on cystoscopy per patient-requesting last progress note/plan per Dr. Adina Malik. -EKG today SR, ~65 bpm.   -he will see Dr. Glenny Green in follow up today. Atrial fibrillation - Patient is high risk for stroke or systemic thromboembolism. Patient Sanjeev Catherine  is 5 with a 4.8% per year stroke risk for age, HTN, CHF. his . Treatment options for atrial fibrillation discussed including rate control, anticoagulation, antiarrhythmics, cardioversion and possible ablation. he is a poor long-term anticoagulation candidate. Specifically regarding risk of anticoagulation patient has:  History of bleeding (recurrent hematuria especially after 6 patient was placed on Xarelto and Eliquis therapy intolerance oral anticoagulation, Increased bleeding risk (e.g. Thrombocytopenia, anemia) and High risk of recurrent falls    Continuing work-up for the watchman procedure seems appropriate. he is a candidate for short term anticoagulation therapy. We have discussed patient unique stroke and bleeding risk both on and off oral-anticoagulation, and the rationale for this referral.  Based on both stroke and bleeding risk, a shared decision has been made to pursue closure of the left atrial appendage as an alternative to oral anticoagulant therapy for stroke prophylaxis and to reduce their long term risk of incidence of bleeding. Kidney cancer/renal mass/prostate enlargement  -10 years ago, treatment, yearly imaging.   -per patient stable.    -Urology Dr. Adina Malik    Cardiomyopathy/systolic CHF  -denies any s/s of CHF today  -Weight is stable per flow sheet  -encouraged continues daily exercise, low salt diet. Hypertension, essential   -BP stable today.   -On lisinopril and Toprol-XL. We will update the Watchman team    Thank you very much for allowing me to participate in the care of your patient. Please do not hesitate to contact me if you have any questions. Sincerely,  Tiffany Aquino MD      [place] [address]  Ph: [phone]  Fax: [phone]    This note was scribed in the presence of Dr. Frances Cabrera MD by Melchor Castillo RN.

## 2022-05-11 ENCOUNTER — TELEPHONE (OUTPATIENT)
Dept: CARDIOLOGY CLINIC | Age: 87
End: 2022-05-11

## 2022-05-11 ENCOUNTER — OFFICE VISIT (OUTPATIENT)
Dept: CARDIOLOGY CLINIC | Age: 87
End: 2022-05-11
Payer: MEDICARE

## 2022-05-11 VITALS
BODY MASS INDEX: 27.03 KG/M2 | SYSTOLIC BLOOD PRESSURE: 120 MMHG | WEIGHT: 222 LBS | HEART RATE: 64 BPM | OXYGEN SATURATION: 96 % | HEIGHT: 76 IN | DIASTOLIC BLOOD PRESSURE: 68 MMHG

## 2022-05-11 VITALS
BODY MASS INDEX: 27.03 KG/M2 | OXYGEN SATURATION: 95 % | HEIGHT: 76 IN | SYSTOLIC BLOOD PRESSURE: 120 MMHG | WEIGHT: 222 LBS | HEART RATE: 71 BPM | DIASTOLIC BLOOD PRESSURE: 68 MMHG

## 2022-05-11 DIAGNOSIS — I50.20 SYSTOLIC HEART FAILURE, UNSPECIFIED HF CHRONICITY (HCC): ICD-10-CM

## 2022-05-11 DIAGNOSIS — I47.1 SVT (SUPRAVENTRICULAR TACHYCARDIA) (HCC): ICD-10-CM

## 2022-05-11 DIAGNOSIS — I48.0 PAF (PAROXYSMAL ATRIAL FIBRILLATION) (HCC): Primary | ICD-10-CM

## 2022-05-11 DIAGNOSIS — I48.0 PAF (PAROXYSMAL ATRIAL FIBRILLATION) (HCC): ICD-10-CM

## 2022-05-11 DIAGNOSIS — Z85.528 HISTORY OF KIDNEY CANCER: ICD-10-CM

## 2022-05-11 DIAGNOSIS — N28.89 RENAL MASS: ICD-10-CM

## 2022-05-11 DIAGNOSIS — I42.8 NON-ISCHEMIC CARDIOMYOPATHY (HCC): ICD-10-CM

## 2022-05-11 DIAGNOSIS — Z86.2 HISTORY OF ANEMIA: ICD-10-CM
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
## 2022-05-11 DIAGNOSIS — R31.9 HEMATURIA, UNSPECIFIED TYPE: ICD-10-CM

## 2022-05-11 DIAGNOSIS — I47.1 SVT (SUPRAVENTRICULAR TACHYCARDIA) (HCC): Primary | ICD-10-CM

## 2022-05-11 DIAGNOSIS — I10 ESSENTIAL HYPERTENSION: ICD-10-CM

## 2022-05-11 PROCEDURE — G8427 DOCREV CUR MEDS BY ELIG CLIN: HCPCS | Performed by: INTERNAL MEDICINE

## 2022-05-11 PROCEDURE — 4040F PNEUMOC VAC/ADMIN/RCVD: CPT | Performed by: INTERNAL MEDICINE

## 2022-05-11 PROCEDURE — 1036F TOBACCO NON-USER: CPT | Performed by: INTERNAL MEDICINE

## 2022-05-11 PROCEDURE — 1123F ACP DISCUSS/DSCN MKR DOCD: CPT | Performed by: INTERNAL MEDICINE

## 2022-05-11 PROCEDURE — 99215 OFFICE O/P EST HI 40 MIN: CPT | Performed by: INTERNAL MEDICINE

## 2022-05-11 PROCEDURE — 93000 ELECTROCARDIOGRAM COMPLETE: CPT | Performed by: INTERNAL MEDICINE

## 2022-05-11 PROCEDURE — 99214 OFFICE O/P EST MOD 30 MIN: CPT | Performed by: INTERNAL MEDICINE

## 2022-05-11 PROCEDURE — G8417 CALC BMI ABV UP PARAM F/U: HCPCS | Performed by: INTERNAL MEDICINE

## 2022-05-11 RX ORDER — FLECAINIDE ACETATE 100 MG/1
100 TABLET ORAL 2 TIMES DAILY
Qty: 60 TABLET | Refills: 3 | Status: SHIPPED | OUTPATIENT
Start: 2022-05-11 | End: 2022-06-03

## 2022-05-11 NOTE — PATIENT INSTRUCTIONS
If you have a return of atrial fibrillation it would be recommended that you undergo the ablation procedure.

## 2022-05-11 NOTE — TELEPHONE ENCOUNTER
I called Dr. Nabila Busch office and spoke to Delores Li asked her to fax over last progress note.      Gave her the clinical fax number and asked her to put it to attention Sofi Gonzales

## 2022-05-11 NOTE — TELEPHONE ENCOUNTER
NEED for this morning. Thanks. Please contact Dr. Bisi Baltazar with Urology for his last progress note for patient as we are seeing him today for 2nd opinion for the Watchman device and need to review his last progress note, instructions, plan for patient regarding blood in urine. Hydroquinone Counseling:  Patient advised that medication may result in skin irritation, lightening (hypopigmentation), dryness, and burning.  In the event of skin irritation, the patient was advised to reduce the amount of the drug applied or use it less frequently.  Rarely, spots that are treated with hydroquinone can become darker (pseudoochronosis).  Should this occur, patient instructed to stop medication and call the office. The patient verbalized understanding of the proper use and possible adverse effects of hydroquinone.  All of the patient's questions and concerns were addressed.

## 2022-05-13 RX ORDER — METOPROLOL SUCCINATE 50 MG/1
25 TABLET, EXTENDED RELEASE ORAL DAILY
Qty: 45 TABLET | Refills: 1 | Status: SHIPPED | OUTPATIENT
Start: 2022-05-13 | End: 2022-05-17 | Stop reason: SDUPTHER

## 2022-05-13 NOTE — TELEPHONE ENCOUNTER
Medication Refill    Medication needing refilled: METOPROLOL SUCCINATE     Dosage of the medication: 50mg    How are you taking this medication (QD, BID, TID, QID, PRN): Take 0.5 tablets by mouth daily    30 or 90 day supply called in: 90    When will you run out of your medication: 3 days    Which Pharmacy are we sending the medication to?: [facility]/pharmacy #1995[facility] [address] [facility].  - P [id number] - F [phone]     [facility]#[phone]

## 2022-05-17 RX ORDER — METOPROLOL SUCCINATE 50 MG/1
25 TABLET, EXTENDED RELEASE ORAL DAILY
Qty: 45 TABLET | Refills: 1 | Status: SHIPPED | OUTPATIENT
Start: 2022-05-17 | End: 2022-11-03

## 2022-05-17 NOTE — TELEPHONE ENCOUNTER
Ronald Arenas called in this afternoon, he states he just left [facility] and they dont have his prescription. He can be reached at [phone].

## 2022-05-18 RX ORDER — CHLORHEXIDINE GLUCONATE 213 G/1000ML
SOLUTION TOPICAL
Qty: 1 EACH | Refills: 0 | Status: ON HOLD
Start: 2022-05-30 | End: 2022-06-06 | Stop reason: HOSPADM

## 2022-05-18 NOTE — TELEPHONE ENCOUNTER
Yadira Fitch MD3/11/2022 referred for Watchman procedure. Dr. Stephany Garcia consulted on 4/21/2022  Dr. Elias Shepherd shared decision on 75/12/2022  EDWARD to be done the day of the procedure.   Insurance approved per [facility]

## 2022-05-18 NOTE — TELEPHONE ENCOUNTER
Spoke with patient today and informed them of the procedure that is being scheduled on 6/6/2022 at 6:30 AM arrival.  Lab work has been order instructed to have done at [facility] on 6/1/2022. Also informed to get Hibiclens bath at their Pharmacy. All procedure instructions were mailed to patient. Instructed to contact me with any questions prior to procedure.

## 2022-05-31 ENCOUNTER — TELEPHONE (OUTPATIENT)
Dept: CARDIOLOGY CLINIC | Age: 87
End: 2022-05-31

## 2022-05-31 NOTE — TELEPHONE ENCOUNTER
Dillan Hendrickson called in this morning, he would like to speak with someone concerning his watchman procedure on 6/6. He can be reached at [phone].

## 2022-06-01 ENCOUNTER — HOSPITAL ENCOUNTER (OUTPATIENT)
Age: 87
Discharge: HOME | End: 2022-06-01
Payer: MEDICARE

## 2022-06-01 DIAGNOSIS — I48.0 PAROXYSMAL A-FIB (HCC): ICD-10-CM

## 2022-06-01 DIAGNOSIS — Z01.818 PRE-OP TESTING: ICD-10-CM

## 2022-06-01 LAB
ABO/RH: NORMAL
ANION GAP SERPL CALCULATED.3IONS-SCNC: 14 MMOL/L (ref 3–16)
ANTIBODY SCREEN: NORMAL
BILIRUBIN URINE: NEGATIVE
BLOOD, URINE: NEGATIVE
BUN BLDV-MCNC: 19 MG/DL (ref 7–20)
CALCIUM SERPL-MCNC: 9.3 MG/DL (ref 8.3–10.6)
CHLORIDE BLD-SCNC: 107 MMOL/L (ref 99–110)
CLARITY: CLEAR
CO2: 18 MMOL/L (ref 21–32)
COLOR: YELLOW
CREAT SERPL-MCNC: 1.4 MG/DL (ref 0.8–1.3)
GFR AFRICAN AMERICAN: 58
GFR NON-AFRICAN AMERICAN: 48
GLUCOSE BLD-MCNC: 106 MG/DL (ref 70–99)
GLUCOSE URINE: NEGATIVE MG/DL
HCT VFR BLD CALC: 41.8 % (ref 40.5–52.5)
HEMOGLOBIN: 14.2 G/DL (ref 13.5–17.5)
KETONES, URINE: NEGATIVE MG/DL
LEUKOCYTE ESTERASE, URINE: NEGATIVE
MCH RBC QN AUTO: 29.7 PG (ref 26–34)
MCHC RBC AUTO-ENTMCNC: 33.9 G/DL (ref 31–36)
MCV RBC AUTO: 87.7 FL (ref 80–100)
MICROSCOPIC EXAMINATION: NORMAL
NITRITE, URINE: NEGATIVE
PDW BLD-RTO: 18.3 % (ref 12.4–15.4)
PH UA: 5 (ref 5–8)
PLATELET # BLD: 222 K/UL (ref 135–450)
PMV BLD AUTO: 6.7 FL (ref 5–10.5)
POTASSIUM SERPL-SCNC: 4.7 MMOL/L (ref 3.5–5.1)
PROTEIN UA: NEGATIVE MG/DL
RBC # BLD: 4.76 M/UL (ref 4.2–5.9)
SODIUM BLD-SCNC: 139 MMOL/L (ref 136–145)
SPECIFIC GRAVITY UA: 1.02 (ref 1–1.03)
URINE TYPE: NORMAL
UROBILINOGEN, URINE: 0.2 E.U./DL
WBC # BLD: 5.7 K/UL (ref 4–11)

## 2022-06-01 PROCEDURE — 85027 COMPLETE CBC AUTOMATED: CPT

## 2022-06-01 PROCEDURE — 86850 RBC ANTIBODY SCREEN: CPT

## 2022-06-01 PROCEDURE — 86900 BLOOD TYPING SEROLOGIC ABO: CPT

## 2022-06-01 PROCEDURE — 86901 BLOOD TYPING SEROLOGIC RH(D): CPT

## 2022-06-01 PROCEDURE — 36415 COLL VENOUS BLD VENIPUNCTURE: CPT

## 2022-06-01 PROCEDURE — 81003 URINALYSIS AUTO W/O SCOPE: CPT

## 2022-06-01 PROCEDURE — 80048 BASIC METABOLIC PNL TOTAL CA: CPT

## 2022-06-02 ENCOUNTER — TELEPHONE (OUTPATIENT)
Dept: CARDIOLOGY CLINIC | Age: 87
End: 2022-06-02

## 2022-06-02 DIAGNOSIS — Z95.818 PRESENCE OF WATCHMAN LEFT ATRIAL APPENDAGE CLOSURE DEVICE: Primary | ICD-10-CM

## 2022-06-02 DIAGNOSIS — I47.1 SVT (SUPRAVENTRICULAR TACHYCARDIA) (HCC): ICD-10-CM

## 2022-06-02 DIAGNOSIS — I42.8 NON-ISCHEMIC CARDIOMYOPATHY (HCC): ICD-10-CM

## 2022-06-02 DIAGNOSIS — I48.0 PAF (PAROXYSMAL ATRIAL FIBRILLATION) (HCC): ICD-10-CM

## 2022-06-02 RX ORDER — METHYLPREDNISOLONE SODIUM SUCCINATE 125 MG/2ML
125 INJECTION, POWDER, LYOPHILIZED, FOR SOLUTION INTRAMUSCULAR; INTRAVENOUS ONCE
Status: DISCONTINUED | OUTPATIENT
Start: 2022-06-06 | End: 2022-06-06 | Stop reason: HOSPADM

## 2022-06-02 NOTE — LETTER
[facility] HEART INST [facility]  Phone: [phone]  Fax: [phone]    June 7, 2022    Donald  [id number]  [place] [address]      Dear Rosetta Payton:    [facility] ORTHOPEDIC AND SPINE [facility] AT [place]   The morning of your Procedure-EDWARD you will park in the hospital parking lot and report directly to the cath lab to check in. DATE: 7/27/22 TIME: 10:30 am      Pre-Procedure Instructions:  1. Do not eat or drink anything 8 hours before your procedure. 2. Hold all diabetic medications the morning of the procedure including, Metfomin. 3. If you take Lantus/Levemir only take ½ your normal dose the evening before. 4. All other medications can be taken in the morning with sips of water. 5. Do not hold anticoagulation therapy: warfarin, eliquis, xarelto therapy. 6. Do not use any lotions, creams or perfume the morning of procedure. 7. Please arrive 1 hour prior to procedure time. 8. Please have a responsible adult to drive you home after procedure. It is recommended you do not drive for 24 hours after procedure. 9. Cath lab will provide you with all post procedure instructions. If you have any questions regarding the procedure itself or medications please call [phone] and ask to speak with a nurse.      Sincerely,    Siddhartha Preciaod

## 2022-06-02 NOTE — TELEPHONE ENCOUNTER
Called patient left message to contact me to go over recent lab work and upcoming Watchman procedure.

## 2022-06-03 RX ORDER — FLECAINIDE ACETATE 100 MG/1
TABLET ORAL
Qty: 90 TABLET | Refills: 3 | Status: SHIPPED | OUTPATIENT
Start: 2022-06-03

## 2022-06-03 NOTE — TELEPHONE ENCOUNTER
Last OV: 5/12/22  Next OV: 11/3/22  Last refill:5/11/22  Most recent Labs: 6/1/22  Last EKG (if needed):6/2/22

## 2022-06-06 ENCOUNTER — HOSPITAL ENCOUNTER (INPATIENT)
Dept: CARDIAC CATH/INVASIVE PROCEDURES | Age: 87
LOS: 1 days | Discharge: HOME | DRG: 274 | End: 2022-06-06
Attending: INTERNAL MEDICINE | Admitting: INTERNAL MEDICINE
Payer: MEDICARE

## 2022-06-06 ENCOUNTER — ANESTHESIA EVENT (OUTPATIENT)
Dept: CARDIAC CATH/INVASIVE PROCEDURES | Age: 87
DRG: 274 | End: 2022-06-06
Payer: MEDICARE

## 2022-06-06 ENCOUNTER — ANESTHESIA (OUTPATIENT)
Dept: CARDIAC CATH/INVASIVE PROCEDURES | Age: 87
DRG: 274 | End: 2022-06-06
Payer: MEDICARE

## 2022-06-06 VITALS
RESPIRATION RATE: 18 BRPM | WEIGHT: 224 LBS | HEIGHT: 76 IN | OXYGEN SATURATION: 94 % | HEART RATE: 75 BPM | SYSTOLIC BLOOD PRESSURE: 128 MMHG | DIASTOLIC BLOOD PRESSURE: 75 MMHG | BODY MASS INDEX: 27.28 KG/M2 | TEMPERATURE: 97.6 F

## 2022-06-06 PROBLEM — I48.21 PERMANENT ATRIAL FIBRILLATION (HCC): Status: ACTIVE | Noted: 2022-06-06

## 2022-06-06 PROBLEM — Z95.818 PRESENCE OF WATCHMAN LEFT ATRIAL APPENDAGE CLOSURE DEVICE: Status: ACTIVE | Noted: 2022-06-06

## 2022-06-06 LAB
ABO/RH: NORMAL
ANION GAP SERPL CALCULATED.3IONS-SCNC: 11 MMOL/L (ref 3–16)
ANTIBODY SCREEN: NORMAL
BUN BLDV-MCNC: 19 MG/DL (ref 7–20)
CALCIUM SERPL-MCNC: 8.2 MG/DL (ref 8.3–10.6)
CHLORIDE BLD-SCNC: 108 MMOL/L (ref 99–110)
CO2: 21 MMOL/L (ref 21–32)
CREAT SERPL-MCNC: 1.4 MG/DL (ref 0.8–1.3)
EKG ATRIAL RATE: 51 BPM
EKG DIAGNOSIS: NORMAL
EKG P AXIS: 13 DEGREES
EKG P-R INTERVAL: 258 MS
EKG Q-T INTERVAL: 514 MS
EKG QRS DURATION: 104 MS
EKG QTC CALCULATION (BAZETT): 473 MS
EKG R AXIS: -10 DEGREES
EKG T AXIS: 61 DEGREES
EKG VENTRICULAR RATE: 51 BPM
GFR AFRICAN AMERICAN: 58
GFR NON-AFRICAN AMERICAN: 48
GLUCOSE BLD-MCNC: 127 MG/DL (ref 70–99)
HCT VFR BLD CALC: 38.4 % (ref 40.5–52.5)
HEMOGLOBIN: 12.7 G/DL (ref 13.5–17.5)
MCH RBC QN AUTO: 29.6 PG (ref 26–34)
MCHC RBC AUTO-ENTMCNC: 33 G/DL (ref 31–36)
MCV RBC AUTO: 89.5 FL (ref 80–100)
PDW BLD-RTO: 17.9 % (ref 12.4–15.4)
PLATELET # BLD: 155 K/UL (ref 135–450)
PMV BLD AUTO: 6.7 FL (ref 5–10.5)
POC ACT LR: 246 SEC
POC ACT LR: 281 SEC
POTASSIUM SERPL-SCNC: 4.6 MMOL/L (ref 3.5–5.1)
RBC # BLD: 4.29 M/UL (ref 4.2–5.9)
SODIUM BLD-SCNC: 140 MMOL/L (ref 136–145)
WBC # BLD: 4.8 K/UL (ref 4–11)

## 2022-06-06 PROCEDURE — 2500000003 HC RX 250 WO HCPCS: Performed by: NURSE ANESTHETIST, CERTIFIED REGISTERED

## 2022-06-06 PROCEDURE — 2720000010 HC SURG SUPPLY STERILE

## 2022-06-06 PROCEDURE — C1769 GUIDE WIRE: HCPCS

## 2022-06-06 PROCEDURE — 2580000003 HC RX 258

## 2022-06-06 PROCEDURE — C1889 IMPLANT/INSERT DEVICE, NOC: HCPCS

## 2022-06-06 PROCEDURE — 2000000000 HC ICU R&B

## 2022-06-06 PROCEDURE — 36415 COLL VENOUS BLD VENIPUNCTURE: CPT

## 2022-06-06 PROCEDURE — C1893 INTRO/SHEATH, FIXED,NON-PEEL: HCPCS

## 2022-06-06 PROCEDURE — 2500000003 HC RX 250 WO HCPCS

## 2022-06-06 PROCEDURE — 86900 BLOOD TYPING SEROLOGIC ABO: CPT

## 2022-06-06 PROCEDURE — 7100000000 HC PACU RECOVERY - FIRST 15 MIN

## 2022-06-06 PROCEDURE — 86850 RBC ANTIBODY SCREEN: CPT

## 2022-06-06 PROCEDURE — 6360000002 HC RX W HCPCS: Performed by: NURSE ANESTHETIST, CERTIFIED REGISTERED

## 2022-06-06 PROCEDURE — APPNB45 APP NON BILLABLE 31-45 MINUTES: Performed by: NURSE PRACTITIONER

## 2022-06-06 PROCEDURE — 85027 COMPLETE CBC AUTOMATED: CPT

## 2022-06-06 PROCEDURE — 02L73DK OCCLUSION OF LEFT ATRIAL APPENDAGE WITH INTRALUMINAL DEVICE, PERCUTANEOUS APPROACH: ICD-10-PCS | Performed by: INTERNAL MEDICINE

## 2022-06-06 PROCEDURE — 93010 ELECTROCARDIOGRAM REPORT: CPT | Performed by: INTERNAL MEDICINE

## 2022-06-06 PROCEDURE — 93005 ELECTROCARDIOGRAM TRACING: CPT | Performed by: INTERNAL MEDICINE

## 2022-06-06 PROCEDURE — 2580000003 HC RX 258: Performed by: NURSE ANESTHETIST, CERTIFIED REGISTERED

## 2022-06-06 PROCEDURE — 3700000000 HC ANESTHESIA ATTENDED CARE

## 2022-06-06 PROCEDURE — 86901 BLOOD TYPING SEROLOGIC RH(D): CPT

## 2022-06-06 PROCEDURE — 6360000004 HC RX CONTRAST MEDICATION: Performed by: INTERNAL MEDICINE

## 2022-06-06 PROCEDURE — 3700000001 HC ADD 15 MINUTES (ANESTHESIA)

## 2022-06-06 PROCEDURE — C1894 INTRO/SHEATH, NON-LASER: HCPCS

## 2022-06-06 PROCEDURE — 33340 PERQ CLSR TCAT L ATR APNDGE: CPT

## 2022-06-06 PROCEDURE — C1760 CLOSURE DEV, VASC: HCPCS

## 2022-06-06 PROCEDURE — 33340 PERQ CLSR TCAT L ATR APNDGE: CPT | Performed by: INTERNAL MEDICINE

## 2022-06-06 PROCEDURE — 85347 COAGULATION TIME ACTIVATED: CPT

## 2022-06-06 PROCEDURE — 93355 ECHO TRANSESOPHAGEAL (TEE): CPT

## 2022-06-06 PROCEDURE — A4216 STERILE WATER/SALINE, 10 ML: HCPCS | Performed by: NURSE ANESTHETIST, CERTIFIED REGISTERED

## 2022-06-06 PROCEDURE — 6360000002 HC RX W HCPCS

## 2022-06-06 PROCEDURE — 2709999900 HC NON-CHARGEABLE SUPPLY

## 2022-06-06 PROCEDURE — B24BZZ4 ULTRASONOGRAPHY OF HEART WITH AORTA, TRANSESOPHAGEAL: ICD-10-PCS | Performed by: INTERNAL MEDICINE

## 2022-06-06 PROCEDURE — 80048 BASIC METABOLIC PNL TOTAL CA: CPT

## 2022-06-06 PROCEDURE — A4216 STERILE WATER/SALINE, 10 ML: HCPCS

## 2022-06-06 RX ORDER — ONDANSETRON 2 MG/ML
4 INJECTION INTRAMUSCULAR; INTRAVENOUS
Status: DISCONTINUED | OUTPATIENT
Start: 2022-06-06 | End: 2022-06-06 | Stop reason: HOSPADM

## 2022-06-06 RX ORDER — SODIUM CHLORIDE 0.9 % (FLUSH) 0.9 %
5-40 SYRINGE (ML) INJECTION PRN
Status: DISCONTINUED | OUTPATIENT
Start: 2022-06-06 | End: 2022-06-06 | Stop reason: HOSPADM

## 2022-06-06 RX ORDER — LIDOCAINE HYDROCHLORIDE 20 MG/ML
INJECTION, SOLUTION EPIDURAL; INFILTRATION; INTRACAUDAL; PERINEURAL PRN
Status: DISCONTINUED | OUTPATIENT
Start: 2022-06-06 | End: 2022-06-06 | Stop reason: SDUPTHER

## 2022-06-06 RX ORDER — ONDANSETRON 2 MG/ML
INJECTION INTRAMUSCULAR; INTRAVENOUS PRN
Status: DISCONTINUED | OUTPATIENT
Start: 2022-06-06 | End: 2022-06-06 | Stop reason: SDUPTHER

## 2022-06-06 RX ORDER — PROPOFOL 10 MG/ML
INJECTION, EMULSION INTRAVENOUS PRN
Status: DISCONTINUED | OUTPATIENT
Start: 2022-06-06 | End: 2022-06-06 | Stop reason: SDUPTHER

## 2022-06-06 RX ORDER — VECURONIUM BROMIDE 1 MG/ML
INJECTION, POWDER, LYOPHILIZED, FOR SOLUTION INTRAVENOUS PRN
Status: DISCONTINUED | OUTPATIENT
Start: 2022-06-06 | End: 2022-06-06 | Stop reason: SDUPTHER

## 2022-06-06 RX ORDER — MIDAZOLAM HYDROCHLORIDE 1 MG/ML
INJECTION INTRAMUSCULAR; INTRAVENOUS PRN
Status: DISCONTINUED | OUTPATIENT
Start: 2022-06-06 | End: 2022-06-06 | Stop reason: SDUPTHER

## 2022-06-06 RX ORDER — METOPROLOL SUCCINATE 25 MG/1
25 TABLET, EXTENDED RELEASE ORAL DAILY
Status: DISCONTINUED | OUTPATIENT
Start: 2022-06-07 | End: 2022-06-06 | Stop reason: HOSPADM

## 2022-06-06 RX ORDER — ASPIRIN 81 MG/1
81 TABLET ORAL DAILY
Status: DISCONTINUED | OUTPATIENT
Start: 2022-06-07 | End: 2022-06-06 | Stop reason: HOSPADM

## 2022-06-06 RX ORDER — GLYCOPYRROLATE 0.2 MG/ML
INJECTION INTRAMUSCULAR; INTRAVENOUS PRN
Status: DISCONTINUED | OUTPATIENT
Start: 2022-06-06 | End: 2022-06-06 | Stop reason: SDUPTHER

## 2022-06-06 RX ORDER — ACETAMINOPHEN 325 MG/1
650 TABLET ORAL EVERY 4 HOURS PRN
Status: DISCONTINUED | OUTPATIENT
Start: 2022-06-06 | End: 2022-06-06 | Stop reason: HOSPADM

## 2022-06-06 RX ORDER — DEXAMETHASONE SODIUM PHOSPHATE 4 MG/ML
INJECTION, SOLUTION INTRA-ARTICULAR; INTRALESIONAL; INTRAMUSCULAR; INTRAVENOUS; SOFT TISSUE PRN
Status: DISCONTINUED | OUTPATIENT
Start: 2022-06-06 | End: 2022-06-06 | Stop reason: SDUPTHER

## 2022-06-06 RX ORDER — LIDOCAINE HYDROCHLORIDE 10 MG/ML
INJECTION, SOLUTION EPIDURAL; INFILTRATION; INTRACAUDAL; PERINEURAL PRN
Status: DISCONTINUED | OUTPATIENT
Start: 2022-06-06 | End: 2022-06-06

## 2022-06-06 RX ORDER — SODIUM CHLORIDE 9 MG/ML
INJECTION INTRAVENOUS PRN
Status: DISCONTINUED | OUTPATIENT
Start: 2022-06-06 | End: 2022-06-06 | Stop reason: SDUPTHER

## 2022-06-06 RX ORDER — SODIUM CHLORIDE 0.9 % (FLUSH) 0.9 %
5-40 SYRINGE (ML) INJECTION EVERY 12 HOURS SCHEDULED
Status: DISCONTINUED | OUTPATIENT
Start: 2022-06-06 | End: 2022-06-06 | Stop reason: HOSPADM

## 2022-06-06 RX ORDER — FENTANYL CITRATE 50 UG/ML
INJECTION, SOLUTION INTRAMUSCULAR; INTRAVENOUS PRN
Status: DISCONTINUED | OUTPATIENT
Start: 2022-06-06 | End: 2022-06-06 | Stop reason: SDUPTHER

## 2022-06-06 RX ORDER — PROTAMINE SULFATE 10 MG/ML
INJECTION, SOLUTION INTRAVENOUS PRN
Status: DISCONTINUED | OUTPATIENT
Start: 2022-06-06 | End: 2022-06-06 | Stop reason: SDUPTHER

## 2022-06-06 RX ORDER — HEPARIN SODIUM 1000 [USP'U]/ML
INJECTION, SOLUTION INTRAVENOUS; SUBCUTANEOUS PRN
Status: DISCONTINUED | OUTPATIENT
Start: 2022-06-06 | End: 2022-06-06 | Stop reason: SDUPTHER

## 2022-06-06 RX ORDER — SODIUM CHLORIDE 9 MG/ML
INJECTION, SOLUTION INTRAVENOUS CONTINUOUS PRN
Status: DISCONTINUED | OUTPATIENT
Start: 2022-06-06 | End: 2022-06-06 | Stop reason: SDUPTHER

## 2022-06-06 RX ORDER — DIPHENHYDRAMINE HYDROCHLORIDE 50 MG/ML
25 INJECTION INTRAMUSCULAR; INTRAVENOUS ONCE
Status: DISCONTINUED | OUTPATIENT
Start: 2022-06-06 | End: 2022-06-06 | Stop reason: HOSPADM

## 2022-06-06 RX ORDER — SODIUM CHLORIDE 9 MG/ML
INJECTION, SOLUTION INTRAVENOUS CONTINUOUS
Status: DISCONTINUED | OUTPATIENT
Start: 2022-06-06 | End: 2022-06-06 | Stop reason: HOSPADM

## 2022-06-06 RX ORDER — SODIUM CHLORIDE 9 MG/ML
INJECTION, SOLUTION INTRAVENOUS PRN
Status: DISCONTINUED | OUTPATIENT
Start: 2022-06-06 | End: 2022-06-06 | Stop reason: HOSPADM

## 2022-06-06 RX ORDER — SUCCINYLCHOLINE/SOD CL,ISO/PF 200MG/10ML
SYRINGE (ML) INTRAVENOUS PRN
Status: DISCONTINUED | OUTPATIENT
Start: 2022-06-06 | End: 2022-06-06 | Stop reason: SDUPTHER

## 2022-06-06 RX ORDER — CLOPIDOGREL BISULFATE 75 MG/1
75 TABLET ORAL DAILY
Qty: 30 TABLET | Refills: 3 | Status: SHIPPED | OUTPATIENT
Start: 2022-06-07

## 2022-06-06 RX ORDER — EPHEDRINE SULFATE/0.9% NACL/PF 50 MG/5 ML
SYRINGE (ML) INTRAVENOUS PRN
Status: DISCONTINUED | OUTPATIENT
Start: 2022-06-06 | End: 2022-06-06 | Stop reason: SDUPTHER

## 2022-06-06 RX ORDER — KETAMINE HCL IN NACL, ISO-OSM 100MG/10ML
SYRINGE (ML) INJECTION PRN
Status: DISCONTINUED | OUTPATIENT
Start: 2022-06-06 | End: 2022-06-06 | Stop reason: SDUPTHER

## 2022-06-06 RX ORDER — CLOPIDOGREL BISULFATE 75 MG/1
75 TABLET ORAL DAILY
Status: DISCONTINUED | OUTPATIENT
Start: 2022-06-07 | End: 2022-06-06 | Stop reason: HOSPADM

## 2022-06-06 RX ORDER — LISINOPRIL 10 MG/1
10 TABLET ORAL DAILY
Status: DISCONTINUED | OUTPATIENT
Start: 2022-06-07 | End: 2022-06-06 | Stop reason: HOSPADM

## 2022-06-06 RX ADMIN — PROPOFOL 50 MG: 10 INJECTION, EMULSION INTRAVENOUS at 07:56

## 2022-06-06 RX ADMIN — Medication 20 MG: at 08:36

## 2022-06-06 RX ADMIN — LIDOCAINE HYDROCHLORIDE 60 MG: 20 INJECTION, SOLUTION EPIDURAL; INFILTRATION; INTRACAUDAL; PERINEURAL at 07:56

## 2022-06-06 RX ADMIN — Medication 20 MG: at 08:12

## 2022-06-06 RX ADMIN — MIDAZOLAM 2 MG: 1 INJECTION INTRAMUSCULAR; INTRAVENOUS at 07:52

## 2022-06-06 RX ADMIN — SODIUM CHLORIDE: 9 INJECTION, SOLUTION INTRAVENOUS at 07:52

## 2022-06-06 RX ADMIN — GLYCOPYRROLATE 0.2 MG: 0.2 INJECTION, SOLUTION INTRAMUSCULAR; INTRAVENOUS at 08:03

## 2022-06-06 RX ADMIN — HEPARIN SODIUM 6000 UNITS: 1000 INJECTION INTRAVENOUS; SUBCUTANEOUS at 08:25

## 2022-06-06 RX ADMIN — Medication 30 MG: at 07:55

## 2022-06-06 RX ADMIN — SODIUM CHLORIDE 5 ML: 9 INJECTION INTRAMUSCULAR; INTRAVENOUS; SUBCUTANEOUS at 08:01

## 2022-06-06 RX ADMIN — SODIUM CHLORIDE: 9 INJECTION, SOLUTION INTRAVENOUS at 08:40

## 2022-06-06 RX ADMIN — Medication 10 MG: at 08:30

## 2022-06-06 RX ADMIN — Medication 20 MG: at 08:04

## 2022-06-06 RX ADMIN — Medication 120 MG: at 07:56

## 2022-06-06 RX ADMIN — PROTAMINE SULFATE 1 MG: 10 INJECTION, SOLUTION INTRAVENOUS at 08:43

## 2022-06-06 RX ADMIN — Medication 20 MG: at 08:29

## 2022-06-06 RX ADMIN — IOPAMIDOL 80 ML: 755 INJECTION, SOLUTION INTRAVENOUS at 09:10

## 2022-06-06 RX ADMIN — ONDANSETRON 4 MG: 2 INJECTION INTRAMUSCULAR; INTRAVENOUS at 08:03

## 2022-06-06 RX ADMIN — PROTAMINE SULFATE 20 MG: 10 INJECTION, SOLUTION INTRAVENOUS at 08:46

## 2022-06-06 RX ADMIN — DEXAMETHASONE SODIUM PHOSPHATE 8 MG: 4 INJECTION, SOLUTION INTRAMUSCULAR; INTRAVENOUS at 08:03

## 2022-06-06 RX ADMIN — SUGAMMADEX 200 MG: 100 INJECTION, SOLUTION INTRAVENOUS at 08:43

## 2022-06-06 RX ADMIN — Medication 10 MG: at 08:21

## 2022-06-06 RX ADMIN — HEPARIN SODIUM 4000 UNITS: 1000 INJECTION INTRAVENOUS; SUBCUTANEOUS at 08:21

## 2022-06-06 RX ADMIN — FENTANYL CITRATE 100 MCG: 50 INJECTION INTRAMUSCULAR; INTRAVENOUS at 07:53

## 2022-06-06 RX ADMIN — VECURONIUM BROMIDE 5 MG: 1 INJECTION, POWDER, LYOPHILIZED, FOR SOLUTION INTRAVENOUS at 08:01

## 2022-06-06 ASSESSMENT — LIFESTYLE VARIABLES: SMOKING_STATUS: 0

## 2022-06-06 NOTE — ANESTHESIA POSTPROCEDURE EVALUATION
Department of Anesthesiology  Postprocedure Note    Patient: Shamir Valle  MRN: [id number]  YOB: 1935  Date of evaluation: 6/6/2022  Time:  1:34 PM     Procedure Summary     Date: 06/06/22 Room / Location: [facility] Cath Lab    Anesthesia Start: 5321 Anesthesia Stop: 4567    Procedure: WATCHMAN Diagnosis: Permanent atrial fibrillation ([address]    Scheduled Providers:  Responsible Provider: Gavin Li MD    Anesthesia Type: General ASA Status: 3          Anesthesia Type: General    Gale Phase I:      Gale Phase II:      Last vitals: Reviewed and per EMR flowsheets.        Anesthesia Post Evaluation    Patient location during evaluation: PACU  Level of consciousness: awake and alert  Airway patency: patent  Nausea & Vomiting: no nausea and no vomiting  Complications: no  Cardiovascular status: blood pressure returned to baseline  Respiratory status: acceptable  Hydration status: euvolemic  Comments: Postoperative Anesthesia Note    Name:    Shamir Valle  MRN:      [id number]    Patient Vitals in the past 12 hrs:  06/06/22 0915, BP:128/75, Pulse:75, Resp:18, SpO2:94 %  06/06/22 0910, BP:134/80, Pulse:76, Resp:16, SpO2:94 %  06/06/22 0905, BP:133/75, Pulse:75, Resp:16, SpO2:95 %  06/06/22 0900, BP:(!) 143/84, Temp:97.6 °F (36.4 °C), Temp src:Infrared, Pulse:75, Resp:12, SpO2:94 %  06/06/22 0715, BP:(!) 91/54, Temp:97.1 °F (36.2 °C), Temp src:Infrared, Pulse:54, Resp:16, SpO2:98 %, Height:6' 4\" (1.93 m), Weight:224 lb (101.6 kg)     LABS:    CBC  Lab Results       Component                Value               Date/Time                  WBC                      4.8                 06/06/2022 09:05 AM        HGB                      12.7 (L)            06/06/2022 09:05 AM        HCT                      38.4 (L)            06/06/2022 09:05 AM        PLT                      155                 06/06/2022 09:05 AM   RENAL  Lab Results       Component                Value               Date/Time NA                       140                 06/06/2022 09:05 AM        K                        4.6                 06/06/2022 09:05 AM        K                        4.7                 02/11/2022 04:27 AM        CL                       108                 06/06/2022 09:05 AM        CO2                      21                  06/06/2022 09:05 AM        BUN                      19                  06/06/2022 09:05 AM        CREATININE               1.4 (H)             06/06/2022 09:05 AM        GLUCOSE                  127 (H)             06/06/2022 09:05 AM   COAGS  No results found for: PROTIME, INR, APTT    Intake & Output:  @28JKQL@    Nausea & Vomiting:  No    Level of Consciousness:  Awake    Pain Assessment:  Adequate analgesia    Anesthesia Complications:  No apparent anesthetic complications    SUMMARY      Vital signs stable  OK to discharge from Stage I post anesthesia care.   Care transferred from Anesthesiology department on discharge from perioperative area

## 2022-06-06 NOTE — TELEPHONE ENCOUNTER
[facility] ORTHOPEDIC AND SPINE [facility] AT [place]   The morning of your Procedure-EDWARD you will park in the hospital parking lot and report directly to the cath lab to check in. DATE: 7/27/22 TIME: 10:30 am      Pre-Procedure Instructions:  Do not eat or drink anything 8 hours before your procedure. Hold all diabetic medications the morning of the procedure including, Metfomin. If you take Lantus/Levemir only take ½ your normal dose the evening before. All other medications can be taken in the morning with sips of water. Do not hold anticoagulation therapy: warfarin, eliquis, xarelto therapy. Do not use any lotions, creams or perfume the morning of procedure. Please arrive 1 hour prior to procedure time. Please have a responsible adult to drive you home after procedure. It is recommended you do not drive for 24 hours after procedure. Cath lab will provide you with all post procedure instructions. If you have any questions regarding the procedure itself or medications please call [phone] and ask to speak with a nurse.

## 2022-06-06 NOTE — ANESTHESIA PRE PROCEDURE
[facility] Department of Anesthesiology  Pre-Anesthesia Evaluation/Consultation       Name:  Wilbur Fox  : 1935  Age:  81 y.o. MRN:  [id number]  Date: 2022           Surgeon: * No surgeons listed *    Procedure: * No procedures listed *     No Known Allergies  Patient Active Problem List   Diagnosis    Tachycardia    Hematuria    Renal mass     Past Medical History:   Diagnosis Date    Arthritis     Cancer ([address]     skin-leg    Enlarged prostate     GERD (gastroesophageal reflux disease)     Gout     History of transesophageal echocardiography (EDWARD) 2022    Mild mitral and tricuspid regurgitation.  Hyperlipidemia     Hypertension     Neuropathy     Wears glasses     Wears partial dentures     Wears partial dentures     upper     Past Surgical History:   Procedure Laterality Date    BACK SURGERY      COLONOSCOPY      COLONOSCOPY N/A 3/16/2021    COLONOSCOPY performed by Loren Eli MD at  [address] Left     ankle    TONSILLECTOMY       Social History     Tobacco Use    Smoking status: Never Smoker    Smokeless tobacco: Never Used   Vaping Use    Vaping Use: Never used   Substance Use Topics    Alcohol use: Not Currently    Drug use: Never     Medications  Current Outpatient Medications on File Prior to Visit   Medication Sig Dispense Refill    flecainide (TAMBOCOR) 100 MG tablet TAKE 1 TABLET BY MOUTH TWICE A DAY 90 tablet 3    chlorhexidine (HIBICLENS) 4 % external liquid Wash from neck down the night before or morning of the procedure 1 each 0    metoprolol succinate (TOPROL XL) 50 MG extended release tablet Take 0.5 tablets by mouth daily 45 tablet 1    aspirin 81 MG EC tablet Take 81 mg by mouth daily      esomeprazole (NEXIUM) 40 MG delayed release capsule TAKE 1 CAPSULE (40 MG TOTAL) BY MOUTH DAILY.       atorvastatin (LIPITOR) 10 MG tablet Take 10 mg by mouth daily      lisinopril (PRINIVIL;ZESTRIL) 10 MG tablet Take 10 mg by mouth daily        Current Facility-Administered Medications on File Prior to Visit   Medication Dose Route Frequency Provider Last Rate Last Admin    0.9 % sodium chloride infusion   IntraVENous Continuous Nancy Lomeli MD        ceFAZolin (ANCEF) 2,000 mg in dextrose 5 % 50 mL IVPB (mini-bag)  2,000 mg IntraVENous On Call to Sherwin Atkins MD        diphenhydrAMINE (BENADRYL) injection 25 mg  25 mg IntraVENous Once Nancy Lomeli MD        methylPREDNISolone sodium (SOLU-MEDROL) injection 125 mg  125 mg IntraVENous Once Nancy Lomeli MD         Current Outpatient Medications   Medication Sig Dispense Refill    flecainide (TAMBOCOR) 100 MG tablet TAKE 1 TABLET BY MOUTH TWICE A DAY 90 tablet 3    chlorhexidine (HIBICLENS) 4 % external liquid Wash from neck down the night before or morning of the procedure 1 each 0    metoprolol succinate (TOPROL XL) 50 MG extended release tablet Take 0.5 tablets by mouth daily 45 tablet 1    aspirin 81 MG EC tablet Take 81 mg by mouth daily      esomeprazole (NEXIUM) 40 MG delayed release capsule TAKE 1 CAPSULE (40 MG TOTAL) BY MOUTH DAILY.  atorvastatin (LIPITOR) 10 MG tablet Take 10 mg by mouth daily      lisinopril (PRINIVIL;ZESTRIL) 10 MG tablet Take 10 mg by mouth daily        No current facility-administered medications for this visit. Facility-Administered Medications Ordered in Other Visits   Medication Dose Route Frequency Provider Last Rate Last Admin    0.9 % sodium chloride infusion   IntraVENous Continuous Nancy Lomeli MD        ceFAZolin (ANCEF) 2,000 mg in dextrose 5 % 50 mL IVPB (mini-bag)  2,000 mg IntraVENous On Call to Sherwin Atkins MD        diphenhydrAMINE (BENADRYL) injection 25 mg  25 mg IntraVENous Once Nancy Lomeli MD        methylPREDNISolone sodium (SOLU-MEDROL) injection 125 mg  125 mg IntraVENous Once Nancy Lomeli MD         Vital Signs (Current)   There were no vitals filed for this visit. BP Readings from Last 3 Encounters:   22 (!) 91/54   22 120/68   22 120/68     Vital Signs Statistics (for past 48 hrs)     Temp  Av.1 °F (36.2 °C)  Min: 97.1 °F (36.2 °C)   Min taken time: 22  Max: 97.1 °F (36.2 °C)   Max taken time: 22  Pulse  Av  Min: 47   Min taken time: 22  Max: 47   Max taken time: 22  Resp  Av  Min: 12   Min taken time: 22  Max: 12   Max taken time: 22  BP  Min: 91/54   Min taken time: 22  Max: 91/54   Max taken time: 22  SpO2  Av %  Min: 98 %   Min taken time: 22  Max: 98 %   Max taken time: 22  BP Readings from Last 3 Encounters:   22 (!) 91/54   22 120/68   22 120/       BMI  There is no height or weight on file to calculate BMI. Estimated body mass index is 27.27 kg/m² as calculated from the following:    Height as of an earlier encounter on 22: 6' 4\" (1.93 m). Weight as of an earlier encounter on 22: 224 lb (101.6 kg).     CBC   Lab Results   Component Value Date    WBC 5.7 2022    RBC 4.76 2022    HGB 14.2 2022    HCT 41.8 2022    MCV 87.7 2022    RDW 18.3 2022     2022     CMP    Lab Results   Component Value Date     2022    K 4.7 2022    K 4.7 2022     2022    CO2 18 2022    BUN 19 2022    CREATININE 1.4 2022    GFRAA 58 2022    AGRATIO 1.0 2022    LABGLOM 48 2022    GLUCOSE 106 2022    PROT 7.7 2022    CALCIUM 9.3 2022    BILITOT 0.6 2022    ALKPHOS 115 2022    AST 16 2022    ALT 18 2022     BMP    Lab Results   Component Value Date     2022    K 4.7 2022    K 4.7 2022     2022    CO2 18 2022    BUN 19 2022    CREATININE 1.4 2022    CALCIUM 9.3 2022 GFRAA 58 06/01/2022    LABGLOM 48 06/01/2022    GLUCOSE 106 06/01/2022     POCGlucose  No results for input(s): GLUCOSE in the last 72 hours. Coags  No results found for: PROTIME, INR, APTT  HCG (If Applicable) No results found for: PREGTESTUR, PREGSERUM, HCG, HCGQUANT   ABGs No results found for: PHART, PO2ART, AFL8EMA, RKI8NOX, BEART, T9DBLZZH   Type & Screen (If Applicable)  No results found for: LABABO, LABRH                         BMI: Wt Readings from Last 3 Encounters:       NPO Status:                          Anesthesia Evaluation  Patient summary reviewed no history of anesthetic complications:   Airway: Mallampati: II  TM distance: >3 FB   Neck ROM: full  Mouth opening: > = 3 FB   Dental:    (+) partials      Pulmonary:       (-) COPD, asthma, sleep apnea and not a current smoker                           Cardiovascular:    (+) hypertension:, hyperlipidemia    (-) past MI, CABG/stent,  angina and  CHF                Neuro/Psych:      (-) seizures, TIA, CVA, psychiatric history and depression/anxiety            GI/Hepatic/Renal:   (+) GERD:, bowel prep,      (-) liver disease, no renal disease and no morbid obesity       Endo/Other:    (+) : arthritis:., malignancy/cancer. (-) diabetes mellitus, hypothyroidism               Abdominal:             Vascular:     - DVT and PE. Other Findings:             Anesthesia Plan      general     ASA 3     (I discussed with the patient the risks and benefits of PIV, general anesthesia, IV Narcotics, PACU. All questions were answered the patient agrees with the plan.)  Induction: intravenous. Anesthetic plan and risks discussed with patient. Plan discussed with CRNA. This pre-anesthesia assessment may be used as a history and physical.    DOS STAFF ADDENDUM:    Pt seen and examined, chart reviewed (including anesthesia, drug and allergy history). No interval changes to history and physical examination.   Anesthetic plan, risks, benefits, alternatives, and personnel involved discussed with patient. Patient verbalized an understanding and agrees to proceed.       Clau Tapia MD  June 6, 2022  7:45 AM

## 2022-06-06 NOTE — PROGRESS NOTES
PT received from cath lab. pt placed on monitor, pt presents with nsr marilym. pt on 3L of oxygen via nc. pt kamilah score is 8/10 at the time of handoff. handoff preformed with riat COLIN.

## 2022-06-06 NOTE — TELEPHONE ENCOUNTER
6/6/2022 s/p SUCCESSFUL WATCHMAN LAAC PROCEDURE PER DR. Beata Nicholas successful deployment of left atrial appendage occlusion device with no complication, WATCHMAN device used 31 mm size. --1-week f/u with DUKE Arteaga CNP on 6/13/2022 at 10:20 AM  --6-month f/u with DUKE Arteaga CNP on 12/12/2022 at 10:20 AM  --1-year f/u unable to schedule d/t calendar doesn't go into 1/2023  All will print on discharge after visit summary. Breana Bowers procedure EDWARD to be completed 45-59 days post procedure (7/21/2022-8/4/2022) Order placed. Thanks.    Mary Ellen Fowler RN, BSN   Watchman Coordinator

## 2022-06-06 NOTE — H&P
Primary Cardiologist: Dr Gavin Osman  Referring Physician: Dr Starla Lei     Reason for Referral: Left atrial appendage closure     CC: \"I am here to discuss Watchman. \"        Subjective:      History of Present Illness:     Gina Wiggins is a 81 y.o. patient with a PMH significant for cardiomyopathy (2/2022), hypertension, hyperlipidemia, kidney CA managed at [facility] and GERD. He presented to the emergency room on 2/2/2022 with complaints of tachycardia after a blood donation. At that time he was noted to be in SVT with rates in the 140's. He was treated with adenosine at that time but continued to have episodes of Afib and SVT. He was seen by Dr Starla Lei in the office on 3/11/2022 and was started on Eliquis 5 mg BID. Patient developed hematuria on Eliquis by 3/15/2022 per patient request he wanted to try Xarelto. Xarelto was sent to his pharmacy and also noted to have hematuria with Xarelto.      Patient is being referred for Left Atrial Appendage Closure with WATCHMAN device for management of stroke risk resulting from non-valvular atrial fibrillation. Based on their past history, it has been determined that they are poor candidates for long-term oral-anticoagulation, however may be tolerant of short term treatment with warfarin as necessary.       Today, he is in the office accompanied by a friend to discuss Watchman. He has had to stop the anticoagulation due to hematuria. He is currently not on anticoagulation therapy.    Patient denies exertional chest pain/pressure, dyspnea at rest, FARMER, PND, orthopnea, palpitations, lightheadedness, weight changes, changes in LE edema, and syncope.      Past Medical History:   has a past medical history of Arthritis, Cancer ([facility] [address] Enlarged prostate, GERD (gastroesophageal reflux disease), Gout, History of transesophageal echocardiography (EDWARD), Hyperlipidemia, Hypertension, Neuropathy, Wears glasses, Wears partial dentures, and Wears partial dentures.     Surgical History:   has a past surgical history that includes Colonoscopy; back surgery; fracture surgery (Left); Tonsillectomy; and Colonoscopy (N/A, 3/16/2021).    Social History:   reports that he has never smoked. He has never used smokeless tobacco. He reports previous alcohol use. He reports that he does not use drugs.      Family History:  family history includes Cancer in his father, mother, and sister.     Home Medications:  Were reviewed and are listed in nursing record and/or below  Home Medications           Prior to Admission medications    Medication Sig Start Date End Date Taking? Authorizing Provider   metoprolol succinate (TOPROL XL) 50 MG extended release tablet Take 0.5 tablets by mouth daily 3/11/22   Yes Dustin Ferreira MD   amiodarone (CORDARONE) 200 MG tablet Take 1 tablet by mouth daily 2/18/22   Yes Odette Haas MD   atorvastatin (LIPITOR) 10 MG tablet Take 10 mg by mouth daily     Yes Historical Provider, MD   lisinopril (PRINIVIL;ZESTRIL) 10 MG tablet Take 10 mg by mouth daily  6/5/20   Yes Historical Provider, MD            CURRENT Medications:    Current Meds Link used for Sign Out Report   No current facility-administered medications for this visit.         Allergies:  Patient has no known allergies.         Review of Systems: All reviewed and refer to HPI  · Constitutional: no unanticipated weight loss. There's been no change in energy level, sleep pattern, or activity level. No fevers, chills. · Eyes: No visual changes or diplopia. No scleral icterus. · ENT: No Headaches, hearing loss or vertigo. No mouth sores or sore throat. · Cardiovascular: No Chest pain, tightness or discomfort. · No Shortness of breath. No Dyspnea on exertion, Orthopnea, Paroxysmal nocturnal dyspnea or breathlessness at rest.  · No Palpitations. · No Syncope ('blackouts', 'faints', 'collapse') or dizziness. · Respiratory: No cough or wheezing, no sputum production. No hematemesis.     · Gastrointestinal: No abdominal pain, appetite loss, blood in stools. No change in bowel or bladder habits. · Genitourinary: No dysuria, trouble voiding, or hematuria. · Musculoskeletal:  No gait disturbance, no joint complaints. · Integumentary: No rash or pruritis. · Neurological: No headache, diplopia, change in muscle strength, numbness or tingling. · Psychiatric: No anxiety or depression. · Endocrine: No temperature intolerance. No excessive thirst, fluid intake, or urination. No tremor. · Hematologic/Lymphatic: No abnormal bruising or bleeding, blood clots or swollen lymph nodes. · Allergic/Immunologic: No nasal congestion or hives.        Objective: all reveiwed       PHYSICAL EXAM:       Vitals:     04/21/22 1301   BP: 138/86   Pulse: 86   SpO2: 97%    Weight: 227 lb (103 kg)       General Appearance:  Alert, cooperative, no distress, appears stated age. Head:  Normocephalic, without obvious abnormality, atraumatic. Eyes:  Pupils equal and round. No scleral icterus. Mouth: Moist mucosa, no pharyngeal erythema. Nose: Nares normal. No drainage or sinus tenderness. Neck: Supple, symmetrical, trachea midline. No adenopathy. No tenderness/mass/nodules. No carotid bruit or elevated JVD. Lungs:   Respiratory Effort: Normal   Auscultation: Clear to auscultation bilaterally, respirations unlabored. No wheeze, rales   Chest Wall:  No tenderness or deformity. Cardiovascular:     Pulses  Palpation: normal   Ascultation: Regular rate, S1/ S2 normal. No murmur, rub, or gallop. 2+ radial and pedal pulses, symmetric  Carotid  Femoral   Abdomen and Gastrointestinal:   Soft, non-tender, bowel sounds active. Liver and Spleen  Masses   Musculoskeletal: No muscle wasting  Back  Gait   Extremities: Extremities normal, atraumatic. No cyanosis or edema. No cyanosis clubbing         Skin: Inspection and palpation performed, no rashes or lesions. Pysch: Normal mood and affect.  Alert and oriented to time place person   Neurologic: Normal gross motor and sensory exam.       Labs: all labs have been reviewed             Lab Results   Component Value Date     WBC 6.4 2022     RBC 4.64 2022     HGB 12.9 2022     HCT 39.2 2022     MCV 84.5 2022     RDW 15.9 2022      2022            Lab Results   Component Value Date      2022     K 4.7 2022      2022     CO2 23 2022     BUN 20 2022     CREATININE 1.4 2022     GFRAA 58 2022     AGRATIO 1.0 2022     LABGLOM 48 2022     GLUCOSE 114 2022     PROT 7.7 2022     CALCIUM 9.3 2022     BILITOT 0.6 2022     ALKPHOS 115 2022     AST 16 2022     ALT 18 2022      No results found for: PTINR  No results found for: LABA1C        Lab Results   Component Value Date     TROPONINI <0.01 2022         Cardiac, Vascular and Imaging Data: All Personally Reviewed in Detail by Myself       EK2022 Sinus rhythm      Echocardiogram:   ECHO 2022  Suboptimal image quality. Patient appears to be in atrial fibrillation. Overall left ventricular systolic function appears severely reduced. Ejection fraction is visually estimated to be 20-25% with diffuse  hypokinesis. Normal left ventricular wall thickness and cavity size. The right ventricle appears normal in size with moderately reduced systolic  function. Mild mitral and tricuspid regurgitation.     Stress Test:      Cath:     Other imaging:   EDWARD 2022  No Left atrial or appendage clot. Mild mitral and tricuspid regurgitation.     Assessment and Plan      Atrial Fibrillation, paroxsymal     Primary Cardiologist: Dr Adebayo Reed  Referring Physician: Dr Chen Needs  Referring Reason: Hematuria     Marisa Ryan is at least 4 (Age,CHF,HTN)  HASbled is at least 2     Currently not on anticoagulation.  He is a poor candidate for chronic anticoagulation with his history of recurrent hematuria He would be an appropriate candidate for the watchman device. Patient is agreeable to proceed with the Watchman procedure and instructed Nunu Varela, the coordinator will get in contact to facilitate scheduling.         Specifically regarding risk of anticoagulation they have demonstrated:  · History of bleeding (eg. Intracerebral, subdural, GI, retro-peritoneal)  · Intolerance oral anticoagulation  · Increased bleeding risk (e.g. Thrombocytopenia, anemia)  · High risk of recurrent falls     We have discussed their unique stroke and bleeding risk both on and off oral-anticoagulation, and the rationale for this referral.  Based on both stroke and bleeding risk, a shared decision has been made to pursue closure of the left atrial appendage as an alternative to oral anticoagulant therapy for stroke prophylaxis and to reduce their long term risk of incidence of bleeding.     Discussed Watchman LAAC at length with patient, heart model, device model and video. We gave educational materials. We discussed pre-procedure workup, timing of restarting AC, AC length, procedure and post procedure follow up/management. No Iodine Allergy. No Nickel/Titanium Allergy. He/She is agreeable to short term AC, proceeding with EDWARD, 2nd opinion/shared decision making  and will follow up with me post workup to discussing timing of the procedure.           I had a detail discussion with the patient and family regarding the risk and benefit of the procedures. I explained to them the details of the procedure. I explained to them that the procedure will be performed under general anesthesia. I explained any risk of bleeding, pericardial effusion and tamponade, perforation of the vessel, stroke, myocardial infarction or death. The patient and family understood the risk and benefits and the details of procedure and would like to go ahead with the procedure. The patient gave informed consent.     All questions and concerns answered at this time.  Confirmed per patient before leaving the office.      I have also asked her to call the office and speak with the [facility] Coordinator with any questions or concerns moving forward.         Thank you for allowing us to participate in the care of Edmundo Ellison.   Please do not hesitate to contact me if you have any questions.     Samreen Ling MD, MPH     [facility], [address]  Ph: [phone]

## 2022-06-06 NOTE — DISCHARGE SUMMARY
Arleen Hidalgo  1935  [id number]    HPI:  80 y/o male with PMH significant for cardiomyopathy, HTN, HLP, kidney CCA Counts include 234 beds at the [facility]) who was admitted for placement of Watchman LAAC device. Earlier this year he presented with SVT and was placed on Eliquis by Dr. Mee Spicer. He developed hematuria within few days and placed on Xarelto in place of Eliquis. He again developed significant hematuria and was referred for [facility] device. It was determined he was a poor candidate for long term anticoagulation. On 6/6/2022 he underwent placement of Watchman LAAC device. He has been ambulated and will be discharged. Patient seen and examined. I have reviewed the notes, assessments, and/or procedures (including HPI, PMH, SH, FH, ROS and PE). Additional history:  Patient is doing well following WATCHMAN implant. Labs and echo reviewed. Ambulation without any issues. Physical Exam:   /75   Pulse 75   Temp 97.6 °F (36.4 °C) (Infrared)   Resp 18   Ht 6' 4\" (1.93 m)   Wt 224 lb (101.6 kg)   SpO2 94%   BMI 27.27 kg/m²   Wt Readings from Last 2 Encounters:   06/06/22 224 lb (101.6 kg)   05/11/22 222 lb (100.7 kg)     Constitutional: He is oriented to person, place, and time. He appears elderly and thin in appearance. HEENT: Normocephalic and atraumatic. Sclerae anicteric. No xanthelasmas. Neck: Neck supple. No JVD present. Carotids without bruits  Cardiovascular: RRR, normal S1 and S2; no murmur/gallop or rub  Pulmonary/Chest: Effort normal.  Lungs clear to auscultation. Chest wall nontender  Abdominal: soft, nontender, nondistended. + bowel sounds  Extremities: No edema, cyanosis, or clubbing. Pulses are 2+ radial/femoral/DP/PT bilaterally. Cap refill brisk. R groin site unremarkable. Neurological: No focal deficit. Skin: Skin is warm and dry. Psychiatric: He has a normal mood and affect.  His speech is normal and behavior is normal.        Limited TTE DATE: 06/06/22:   No significant effusion noted post procedure (preliminary)    ECG 6/6/2022  Sinus bradycardia    6/6/2022 Watchman implant  successful deployment of left atrial appendage occlusion device with no complication  WATCHMAN device used 31 mm size   Angiogram revealed good seal and no thrombus   EDWARD confirmed placement and seal    4/26/2022 Echo  Normal LV size,wall thickness and motion. EF ~ 60%. Normal diastolic function. Left atrium is of normal size. Normal right ventricular size and function. Mild Mitral and Tricuspid regurgitation. Impression     1. Paroxysmal Atrial fibrillation (non-valvular)   -S/P placement of Watchman LAAC device on 6/6/2022  -continue with ASA and plavix and EDWARD at 45 days  -CHADS score at least 4       HASBLED at least 2  -poor long term anticoagulation candidate d/t recurrent hematuria      Recommendations     Tretament with dual antiplatelet therapy  EDWARD at 45 days      - antibiotic prophylaxis (amoxicillin 2 g once 60 minutes prior to procedure) for 6 months for:  a. Dental procedures including cleanings  b. Gastrointestinal procedures  c.     Genitourinary procedures  d. Respiratory procedures involving incision or biopsy  e. Procedures on infected skin or muscle.          Medication List      START taking these medications    clopidogrel 75 MG tablet  Commonly known as: PLAVIX  Take 1 tablet by mouth daily  Start taking on: June 7, 2022        Ivan Listen taking these medications    aspirin 81 MG EC tablet     esomeprazole 40 MG delayed release capsule  Commonly known as: NEXIUM     flecainide 100 MG tablet  Commonly known as: TAMBOCOR  TAKE 1 TABLET BY MOUTH TWICE A DAY     Lipitor 10 MG tablet  Generic drug: atorvastatin     lisinopril 10 MG tablet  Commonly known as: PRINIVIL;ZESTRIL     metoprolol succinate 50 MG extended release tablet  Commonly known as: TOPROL XL  Take 0.5 tablets by mouth daily        STOP taking these medications    Hibiclens 4 % external liquid  Generic drug: chlorhexidine Where to Get Your Medications      These medications were sent to [address] [place] [address] Rd, [address] [facility] Jessie [phone] - F [phone]  [address] [facility], [address] [id number]    Phone: [phone]   · clopidogrel 75 MG tablet          Dispo: It is medically necessary that patients undergoing percutaneous left atrial appendage occlusion are admitted as an inpatient. This patient had a recovery that was earlier than expected and can be discharged today. Patient will follow up in 1-2 weeks and will undergo TTE at 45 days following WATCHMAN implant.     Electronically signed by DUKE Youngblood CNP on 6/6/2022 at 11:34 AM

## 2022-06-06 NOTE — PROCEDURES
[address]   Procedure Note      Procedure Performed by: Mary Grace Hernandez MD      Procedures performed:     · Percutaneous transcatheter closure of the left atrial appendage with endocardial implant   · Transeptal Puncture  · EDWARD      Indication of procedure:  atrial fibrillation, CVA,  High Risk for bleeding  Bleeding Episodes    Patient has been seen by General cardiology and shared decision making has been made for pursuing DANDRE closure. Patient here for DANDRE closure with Watchman device. Details of procedure: The patient was brought to the electrophysiology laboratory in stable condition. The patient was in a fasting and non-sedated state. The risks, benefits and alternatives of the procedure were discussed with the patient. The risks including, but not limited to, the risks of vascular injury, bleeding, infection, device malfunction, lead dislodgement, radiation exposure, injury to cardiac and surrounding structures (including pneumothorax), stroke, myocardial infarction and death were discussed in detail. The patient opted to proceed with the device implantation. Written informed consent was signed and placed in the chart. Prophylactic antibiotic was given. The patient was prepped and draped in a sterile fashion. A timeout protocol was completed to identify the patient and the procedure being performed. Patient underwent general anesthesia by anesthesiology team.       Transesophageal echocardiography was performed and measurements of left atrial appendage, including ostium size and depth were obtained for selection of appropriate watchman device. Decision was made to use a size 31 mm Watchman device based on EDWARD measurement. Both groins were prepped in a sterile fashion. We gained access in both femoral veins. Right femoral access was obtained using modified using modified seldinger technique. Patient received a bolus of heparin prior transseptal puncture.  Transseptal punctures through intact septum were done using EDWARD guidance as well as pressure monitoring , and fluoroscopy in [place] and BOOGIE projections. We placed one SL-1 Sheaths inside the left atrium. A long wire was placed into the left superior pulmonary vein. Then the SL sheath was exchanged over the wire with double curve watchman access sheath. Then a pigtail catheter was inserted into the access sheath and was placed inside the left atrial appendage. Angiography of DANDRE was performed. Pigtail catheter was removed. Then Watchman delivery sheath was inserted into the access sheath. Using fluoroscopy and live EDWARD the anterior lobe of the appendage was located and delivery sheath was advanced into this lobe. Then device was implanted into the appendage under fluoroscopy and live EDWARD imaging. It was noted that the device had a bit of shoulder in the inferior portion. ATug test was done multiple times to insure device stability   EDWARD guidance and 3D guidance revealed device to be well seated did not correct the position. After deployment a tug test was done and stability of device was checked. Position of device was checked. Measurement of device showed appropriate compression of the device. Using color Doppler, no leak around the was noted. PASS criteria for releasing of device were met and device was released. A figure of 8 was used to achieve hemostasis. Patient was extubated and transferred to the floor. No immediate complications noted.        Assessment and Summary:    successful deployment of left atrial appendage occlusion device with no complication    WATCHMAN device used 31 mm size     Angiogram revealed good seal and no thrombus     EDWARD confirmed placement and seal    EBL Less than 30 mL  No complications        Plan:     The patient will be admitted and have usual post care  Start anticoagulation  Start ASA  Echocardiogram tomorrow   Patient is participating in the left atrial appendage occlusion/closure registry. [address] is approved by the [employer] for Medicare and Medicaid Services (CMS) to meet the registry requirements outlined in the national coverage decisions for Percutaneous Left Atrial Appendage Closure     Thank you for letting me participate in this patient's care and please do not hesitate to call me with any questions or concerns.      Rachel Ruth MD, MPH     [facility], [address] [place] [address] [address] [facility]  Ph: [phone]  Fax: [phone]

## 2022-06-07 NOTE — TELEPHONE ENCOUNTER
Spoke with patient and answered all questions he had relating to medications and follow up appointments.

## 2022-06-07 NOTE — TELEPHONE ENCOUNTER
Pt is agreeable to date/time. Went over pre-procedure instructions. Pt v/u. Mailed instructions to pt's home address. Published on [employer] and e-mail to [place]. Pt has questions about medications.  He can be reached at [phone]

## 2022-06-21 ENCOUNTER — TELEPHONE (OUTPATIENT)
Dept: CARDIOLOGY CLINIC | Age: 87
End: 2022-06-21

## 2022-06-21 NOTE — TELEPHONE ENCOUNTER
Glen Rowland called in this morning, he would like to speak with someone concerning the EDWARD procedure, he is not sure what they will be doing at the procedure. He can be reached at [phone].

## 2022-06-21 NOTE — PROGRESS NOTES
dentures     Wears partial dentures     upper     Past Surgical History:   Procedure Laterality Date    BACK SURGERY      COLONOSCOPY      COLONOSCOPY N/A 3/16/2021    COLONOSCOPY performed by Martha Guerrero MD at [address] Left     ankle    TONSILLECTOMY       Family History   Problem Relation Age of Onset    Cancer Mother     Cancer Father     Cancer Sister      Social History     Tobacco Use    Smoking status: Never Smoker    Smokeless tobacco: Never Used   Vaping Use    Vaping Use: Never used   Substance Use Topics    Alcohol use: Not Currently    Drug use: Never       No Known Allergies  Current Outpatient Medications   Medication Sig Dispense Refill    predniSONE (DELTASONE) 10 MG tablet Take 10 mg by mouth daily      clopidogrel (PLAVIX) 75 MG tablet Take 1 tablet by mouth daily 30 tablet 3    flecainide (TAMBOCOR) 100 MG tablet TAKE 1 TABLET BY MOUTH TWICE A DAY 90 tablet 3    metoprolol succinate (TOPROL XL) 50 MG extended release tablet Take 0.5 tablets by mouth daily 45 tablet 1    aspirin 81 MG EC tablet Take 81 mg by mouth daily      esomeprazole (NEXIUM) 40 MG delayed release capsule TAKE 1 CAPSULE (40 MG TOTAL) BY MOUTH DAILY.  atorvastatin (LIPITOR) 10 MG tablet Take 10 mg by mouth daily      lisinopril (PRINIVIL;ZESTRIL) 10 MG tablet Take 10 mg by mouth daily        No current facility-administered medications for this visit. Physical Exam:   /78   Pulse 60   Ht 6' 4\" (1.93 m)   Wt 219 lb 12.8 oz (99.7 kg)   SpO2 98%   BMI 26.75 kg/m²   Wt Readings from Last 2 Encounters:   06/27/22 219 lb 12.8 oz (99.7 kg)   06/06/22 224 lb (101.6 kg)     Constitutional: He is oriented to person, place, and time. He is elderly and in no acute distress. HEENT: Normocephalic and atraumatic. Sclerae anicteric. No xanthelasmas. Neck: Supple. No JVD present. Carotids without bruits.    Cardiovascular: RRR, normal S1 and S2; no murmur/gallop or rub  Pulmonary/Chest: Effort normal.  Lungs clear to auscultation. Chest wall nontender  Abdominal: soft, nontender, nondistended. + bowel sounds  Extremities: No edema, cyanosis, or clubbing. Pulses are 2+ radial/carotid/DP bilaterally. Cap refill brisk. Neurological: No focal deficit. Skin: Skin is warm and dry. Psychiatric: He has a normal mood and affect. His speech is normal and behavior is normal.     Lab Review:   No results found for: TRIG, HDL, LDLCALC, LDLDIRECT, LABVLDL  Lab Results   Component Value Date     06/06/2022    K 4.6 06/06/2022    K 4.7 02/11/2022     06/06/2022    CO2 21 06/06/2022    BUN 19 06/06/2022    CREATININE 1.4 06/06/2022    GLUCOSE 127 06/06/2022    CALCIUM 8.2 06/06/2022      Lab Results   Component Value Date    WBC 4.8 06/06/2022    HGB 12.7 (L) 06/06/2022    HCT 38.4 (L) 06/06/2022    MCV 89.5 06/06/2022     06/06/2022     Limited TTE DATE: 06/06/22: There is a trivial pericardial effusion noted.     ECG 6/6/2022  Sinus bradycardia     6/6/2022 Watchman implant  successful deployment of left atrial appendage occlusion device with no complication  WATCHMAN device used 31 mm size   Angiogram revealed good seal and no thrombus   EDWARD confirmed placement and seal     4/26/2022 Echo  Normal LV size,wall thickness and motion. EF ~ 60%. Normal diastolic function. Left atrium is of normal size. Normal right ventricular size and function. Mild Mitral and Tricuspid regurgitation. Assessment:    1. PAF (paroxysmal atrial fibrillation) ([facility])  -S/P Watchman placement LAAC device on 6/6/2022  -continue with ASA and plavix  -EDWARD scheduled on 7/27/2022  -CHADS score at least 4       HASBLED at least 2  -poor long term anticoagulation candidate d/t recurrent hematuria    Plan:  Continue ASA, plavix, flecainide, toprol and statin  Discussed low fat/low sodium diet and reinforced regular aerobic exercise.   Antibiotic prophylaxis (amoxicillin 2 g once 60 minutes prior to procedure) for 6 months for:  a.     Dental procedures including cleanings  b.     Gastrointestinal procedures  c.     Genitourinary procedures  d.     Respiratory procedures involving incision or biopsy  e.     Procedures on infected skin or muscle. RTO as scheduled with Dr. Ponce Nevarez in 2022 and Dr. Paige King as scheduled    Return for as scheduled with Dr. Ponce Nevarez later this year. Thanks for allowing me to participate in the care of this patient.       ALEX Del Angel  [facility], [address] [place] [address]  Office: [phone]  Fax: [phone]      Electronically signed by DUKE Quintero CNP on 6/27/2022 at 9:54 AM

## 2022-06-27 ENCOUNTER — OFFICE VISIT (OUTPATIENT)
Dept: CARDIOLOGY CLINIC | Age: 87
End: 2022-06-27
Payer: MEDICARE

## 2022-06-27 VITALS
HEART RATE: 60 BPM | DIASTOLIC BLOOD PRESSURE: 78 MMHG | SYSTOLIC BLOOD PRESSURE: 124 MMHG | WEIGHT: 219.8 LBS | HEIGHT: 76 IN | BODY MASS INDEX: 26.77 KG/M2 | OXYGEN SATURATION: 98 %

## 2022-06-27 DIAGNOSIS — I48.0 PAF (PAROXYSMAL ATRIAL FIBRILLATION) (HCC): Primary | ICD-10-CM

## 2022-06-27 PROCEDURE — 1124F ACP DISCUSS-NO DSCNMKR DOCD: CPT | Performed by: NURSE PRACTITIONER

## 2022-06-27 PROCEDURE — 1111F DSCHRG MED/CURRENT MED MERGE: CPT | Performed by: NURSE PRACTITIONER

## 2022-06-27 PROCEDURE — 1036F TOBACCO NON-USER: CPT | Performed by: NURSE PRACTITIONER

## 2022-06-27 PROCEDURE — 99213 OFFICE O/P EST LOW 20 MIN: CPT | Performed by: NURSE PRACTITIONER

## 2022-06-27 PROCEDURE — G8427 DOCREV CUR MEDS BY ELIG CLIN: HCPCS | Performed by: NURSE PRACTITIONER

## 2022-06-27 PROCEDURE — G8417 CALC BMI ABV UP PARAM F/U: HCPCS | Performed by: NURSE PRACTITIONER

## 2022-06-27 RX ORDER — PREDNISONE 10 MG/1
10 TABLET ORAL DAILY
COMMUNITY

## 2022-06-27 NOTE — PATIENT INSTRUCTIONS
Miralax okay to take for constipation          [facility] AT [employer]   The morning of your Procedure-EDWARD you will park in the hospital parking lot and report directly to the cath lab to check in.      DATE: 7/27/22 TIME: 10:30 am        Pre-Procedure Instructions:  1. Do not eat or drink anything 8 hours before your procedure. 2. Hold all diabetic medications the morning of the procedure including, Metfomin. 3. If you take Lantus/Levemir only take ½ your normal dose the evening before. 4. All other medications can be taken in the morning with sips of water. 5. Do not hold anticoagulation therapy: warfarin, eliquis, xarelto therapy. 6. Do not use any lotions, creams or perfume the morning of procedure. 7. Please arrive 1 hour prior to procedure time. 8. Please have a responsible adult to drive you home after procedure. It is recommended you do not drive for 24 hours after procedure. 9. Cath lab will provide you with all post procedure instructions.      If you have any questions regarding the procedure itself or medications please call [phone] and ask to speak with a nurse.

## 2022-06-29 ENCOUNTER — TELEPHONE (OUTPATIENT)
Dept: CARDIOLOGY CLINIC | Age: 87
End: 2022-06-29

## 2022-06-29 NOTE — TELEPHONE ENCOUNTER
Pt called to speak to someone in regards to his issue he is having since his Watchman was done a few weeks ago. He has blood in his urine.       Darian Riley # [phone]

## 2022-06-29 NOTE — TELEPHONE ENCOUNTER
Spoke with patient and instructed him to see his Urologist and increase fluid intake. for the Hematuria,  Also to call if bleeding gets worse.

## 2022-07-25 ENCOUNTER — TELEPHONE (OUTPATIENT)
Dept: CARDIOLOGY CLINIC | Age: 87
End: 2022-07-25

## 2022-07-25 NOTE — TELEPHONE ENCOUNTER
Pt needs to reschedule his EDWARD for this Wed 7/27 Emergency in [place]. [facility]       Gonzalez Solorzano [phone]

## 2022-07-25 NOTE — TELEPHONE ENCOUNTER
Clarice Boyce RN     RG    1:59 PM  Note    Patient to have a CTA instead of EDWARD per Dr. Jake Llamas for Post Watchman follow up        Order placed for CTA.

## 2022-07-25 NOTE — TELEPHONE ENCOUNTER
Called central scheduling and pt is scheduled on 8/3/22 at 10:40 am.       * NPO 4 hours prior to the scheduled time (May have small amount of water with medications)  * Arrive 15 minutes before appointment at the information desk in the front lobby, then report to the Diagnostic . Went over date/time with Ronald Arenas. He v/u.

## 2022-07-25 NOTE — TELEPHONE ENCOUNTER
Spoke with Amada Malcolm and he is available on 8/3/22. Advised him that I would check with Dr. Amauri Cortez and call him back. He v/u.

## 2022-07-25 NOTE — TELEPHONE ENCOUNTER
Len Quintanilla called in this afternoon, to reschedule his EDWARD procedure with Liudmila Eddy. He can be reached at [phone].

## 2022-08-03 ENCOUNTER — HOSPITAL ENCOUNTER (OUTPATIENT)
Dept: CT IMAGING | Age: 87
Discharge: HOME | End: 2022-08-03
Payer: MEDICARE

## 2022-08-03 ENCOUNTER — TELEPHONE (OUTPATIENT)
Dept: CARDIOLOGY CLINIC | Age: 87
End: 2022-08-03

## 2022-08-03 DIAGNOSIS — I42.8 NON-ISCHEMIC CARDIOMYOPATHY (HCC): ICD-10-CM

## 2022-08-03 DIAGNOSIS — I48.0 PAF (PAROXYSMAL ATRIAL FIBRILLATION) (HCC): ICD-10-CM

## 2022-08-03 DIAGNOSIS — I47.1 SVT (SUPRAVENTRICULAR TACHYCARDIA) (HCC): ICD-10-CM

## 2022-08-03 LAB
GFR AFRICAN AMERICAN: >60
GFR NON-AFRICAN AMERICAN: 52
PERFORMED ON: ABNORMAL
POC CREATININE: 1.3 MG/DL (ref 0.8–1.3)
POC SAMPLE TYPE: ABNORMAL

## 2022-08-03 PROCEDURE — 75574 CT ANGIO HRT W/3D IMAGE: CPT

## 2022-08-03 PROCEDURE — 6360000004 HC RX CONTRAST MEDICATION: Performed by: INTERNAL MEDICINE

## 2022-08-03 PROCEDURE — 82565 ASSAY OF CREATININE: CPT

## 2022-08-03 RX ADMIN — IOPAMIDOL 75 ML: 755 INJECTION, SOLUTION INTRAVENOUS at 11:19

## 2022-08-03 NOTE — TELEPHONE ENCOUNTER
Contacted patient to go over new recommendations and reviewed ASA again. He vu and has no q/c at this time. Notes that he did not communicate with PCP that his kidney specialist moved to [place]. Last check was >6 months. He will contact PCP today or tomorrow and keep us updated.

## 2022-08-03 NOTE — TELEPHONE ENCOUNTER
Returned call to patient and he states that he developed blood in his urine. I reviewed his medications and he has been taking Asa twice daily. Advised him only take his Edmonia Hose once daily with the Plavix daily. Asked him to call if bleeding continues. S/p Watchman 6/6/2022. He had his CTA today. Any other recommendations?

## 2022-08-03 NOTE — TELEPHONE ENCOUNTER
Daniel Del Real called in this afternoon, he states he has blood in his urine, no SOB, no chest pains. He can be reached at [phone].

## 2022-08-04 NOTE — TELEPHONE ENCOUNTER
Mickey Burgess is calling in wanting to let Maximinolouann Myrna know that he does not have blood in his urine this morning, but is still going to go to his PCP to have his urine checked

## 2022-08-08 ENCOUNTER — TELEPHONE (OUTPATIENT)
Dept: CARDIOLOGY CLINIC | Age: 87
End: 2022-08-08

## 2022-08-08 NOTE — TELEPHONE ENCOUNTER
Called and spoke to patient. BERYL chauman looks good DAPT only per result note. Continue Asa and Plavix. Also checked to see if he is still having blood in urine and he states that it has resolved. Encouraged him to call with any questions or concerns.

## 2022-11-03 RX ORDER — METOPROLOL SUCCINATE 50 MG/1
TABLET, EXTENDED RELEASE ORAL
Qty: 45 TABLET | Refills: 1 | Status: SHIPPED | OUTPATIENT
Start: 2022-11-03

## 2022-11-03 NOTE — TELEPHONE ENCOUNTER
Last OV:2022  Last Labs:bmp 2022  Last Refills:2022  Next Appt:2022  Last EK2022      metoprolol succinate (TOPROL XL

## 2022-11-06 NOTE — PROGRESS NOTES
[facility]  Cardiology  Note      Sandra Overton  1935, 81 y.o.      CC: \"               Oscar Clarity DRAKE:      HPI:   This is a 81 y.o. male with a PMH significant for SVT, Afib, cardiomyopathy (2/2022), hypertension, hyperlipidemia, kidney CA managed at [facility] and GERD. From a cardiac standpoint he has history of A. fib, SVT and cardiomyopathy with EF of 25%. Cardiomyopathy has resolved and the EF is now normal;  he has been cardioverted initially and has been managed with flecainide. He could not tolerate anticoagulation because of hematuria; later underwent  watchman placement . Is currently on aspirin only. Patient here for follow-up and doing very well has no shortness of breath orthopnea PND palpitations        Past Medical History:   Diagnosis Date    Arthritis     Cancer ([address]     skin-leg    Enlarged prostate     GERD (gastroesophageal reflux disease)     Gout     History of transesophageal echocardiography (EDWARD) 02/04/2022    Mild mitral and tricuspid regurgitation.     Hyperlipidemia     Hypertension     Neuropathy     Wears glasses     Wears partial dentures     Wears partial dentures     upper      Past Surgical History:   Procedure Laterality Date    BACK SURGERY      COLONOSCOPY      COLONOSCOPY N/A 3/16/2021    COLONOSCOPY performed by Urmila Talavera MD at [facility] Left     ankle    TONSILLECTOMY        Family History   Problem Relation Age of Onset    Cancer Mother     Cancer Father     Cancer Sister       Social History     Tobacco Use    Smoking status: Never    Smokeless tobacco: Never   Vaping Use    Vaping Use: Never used   Substance Use Topics    Alcohol use: Not Currently    Drug use: Never     No Known Allergies      Review of Systems -   Constitutional: Negative for weight gain/loss; malaise, fever  Respiratory: Negative for Asthma;  cough and hemoptysis  Cardiovascular: Negative for palpitations,dizziness   Gastrointestinal: Negative for abd.pain; constipation/diarrhea;    Genitourinary: Negative for stones; hematuria; frequency hesitancy  Integumentt: Negative for rash or pruritis  Hematologic/lymphatic: Negative for blood dyscrasia; leukemia/lymphoma  Musculoskeletal: Negative for Connective tissue disease  Neurological:  Negative for Seizure   Behavioral/Psych:Negative for Bipolar disorder, Schizophrenia; Dementia  Endocrine: negative for thyroid, parathyroid disease    Physical Examination:    There were no vitals taken for this visit. HEENT:  Face: Atraumatic, Conjunctiva: Pink; non icteric,  Mucous Memb:  Moist, No thyromegaly or Lymphadenopathy  Respiratory:  Resp Assessment: normal, Resp Auscultation: clear  Cardiovascular: Auscultation: nl S1 & S2, Palpation:  Nl PMI; No heaves or thrills, JVP:  normal  Abdomen: Soft, non-tender, Normal bowel sounds,  No organomegaly  Extremities: No Cyanosis or Clubbing  Neurological: Oriented to time, place, and person, Non-anxious  Psychiatric: Normal mood and affect  Skin: Warm and dry,  No rash seen     No outpatient medications have been marked as taking for the 22 encounter (Appointment) with David Jason MD.         EK2022; sinus rhythm      ECHO: 22   Suboptimal image quality. Patient appears to be in atrial fibrillation. Overall left ventricular systolic function appears severely reduced. Ejection fraction is visually estimated to be 20-25% with diffuse   hypokinesis. Normal left ventricular wall thickness and cavity size. The right ventricle appears normal in size with moderately reduced systolic   function. Mild mitral and tricuspid regurgitation. EDWARD: 22  No Left atrial or appendage clot. Mild mitral and tricuspid regurgitation. CV: 22  Indication: AF  Anesthesia: Brevital  20  Single synchronized biphasic cardioversion. Successful  Converted to SR. Echo: 22  Normal LV size,wall thickness and motion. EF 60%.  Normal diastolic function. Left atrium is of normal size. Normal right ventricular size and function. Mild Mitral and Tricuspid regurgitation. ASSESSMENT AND PLAN:          Paroxysmal atrial fibrillation  Status post cardioversion; now on flecainide and metoprolol XL  CHADS Vasc is at least 3 (HTN, AGE); Intolerant to [address] (Eliquis and Xarelto) d/t hematuria   Now s/p Watchman LAAC device 6/6/2022 on aspirin. Cardiomyopathy  EF 20-25% by Echo 2/2022; Has resolved. Last echo in April showed EF of 60%  no clinical heart failure    Essential hypertension   BP is controlled  Continue lisinopril   Risk factor modifications     Kidney cancer/Renal Mass/Prostate enlargement  S/p ablation   Managed at [facility]     Return to the office in 6 months     Thank you very much for allowing me to participate in the care of your patient. Please do not hesitate to contact me if you have any questions.       Sincerely,    Stacy Steele M.D  [facility] AND [facility], [address] [place] [address]  Ph: [phone]  Fax: [phone]

## 2022-11-07 ENCOUNTER — OFFICE VISIT (OUTPATIENT)
Dept: CARDIOLOGY CLINIC | Age: 87
End: 2022-11-07
Payer: MEDICARE

## 2022-11-07 VITALS
OXYGEN SATURATION: 97 % | BODY MASS INDEX: 27.27 KG/M2 | WEIGHT: 224 LBS | HEART RATE: 84 BPM | SYSTOLIC BLOOD PRESSURE: 142 MMHG | DIASTOLIC BLOOD PRESSURE: 78 MMHG

## 2022-11-07 DIAGNOSIS — N28.89 RENAL MASS: ICD-10-CM

## 2022-11-07 DIAGNOSIS — I48.0 PAF (PAROXYSMAL ATRIAL FIBRILLATION) (HCC): ICD-10-CM

## 2022-11-07 DIAGNOSIS — I42.8 NON-ISCHEMIC CARDIOMYOPATHY (HCC): ICD-10-CM

## 2022-11-07 DIAGNOSIS — I10 ESSENTIAL HYPERTENSION: ICD-10-CM

## 2022-11-07 DIAGNOSIS — I47.1 SVT (SUPRAVENTRICULAR TACHYCARDIA) (HCC): Primary | ICD-10-CM

## 2022-11-07 PROCEDURE — G8484 FLU IMMUNIZE NO ADMIN: HCPCS | Performed by: INTERNAL MEDICINE

## 2022-11-07 PROCEDURE — 1124F ACP DISCUSS-NO DSCNMKR DOCD: CPT | Performed by: INTERNAL MEDICINE

## 2022-11-07 PROCEDURE — 99214 OFFICE O/P EST MOD 30 MIN: CPT | Performed by: INTERNAL MEDICINE

## 2022-11-07 PROCEDURE — G8427 DOCREV CUR MEDS BY ELIG CLIN: HCPCS | Performed by: INTERNAL MEDICINE

## 2022-11-07 PROCEDURE — 1036F TOBACCO NON-USER: CPT | Performed by: INTERNAL MEDICINE

## 2022-11-07 PROCEDURE — G8417 CALC BMI ABV UP PARAM F/U: HCPCS | Performed by: INTERNAL MEDICINE

## 2022-11-07 PROCEDURE — 93000 ELECTROCARDIOGRAM COMPLETE: CPT | Performed by: INTERNAL MEDICINE

## 2022-11-07 RX ORDER — LISINOPRIL 20 MG/1
20 TABLET ORAL DAILY
Qty: 90 TABLET | Refills: 5 | Status: SHIPPED | OUTPATIENT
Start: 2022-11-07

## 2022-11-16 ENCOUNTER — OFFICE VISIT (OUTPATIENT)
Dept: CARDIOLOGY CLINIC | Age: 87
End: 2022-11-16
Payer: MEDICARE

## 2022-11-16 ENCOUNTER — TELEPHONE (OUTPATIENT)
Dept: CARDIOLOGY CLINIC | Age: 87
End: 2022-11-16

## 2022-11-16 VITALS
OXYGEN SATURATION: 95 % | SYSTOLIC BLOOD PRESSURE: 134 MMHG | WEIGHT: 227 LBS | HEIGHT: 76 IN | BODY MASS INDEX: 27.64 KG/M2 | DIASTOLIC BLOOD PRESSURE: 80 MMHG | HEART RATE: 67 BPM

## 2022-11-16 DIAGNOSIS — I48.0 PAF (PAROXYSMAL ATRIAL FIBRILLATION) (HCC): ICD-10-CM

## 2022-11-16 DIAGNOSIS — I47.1 SVT (SUPRAVENTRICULAR TACHYCARDIA) (HCC): Primary | ICD-10-CM

## 2022-11-16 DIAGNOSIS — Z95.818 PRESENCE OF WATCHMAN LEFT ATRIAL APPENDAGE CLOSURE DEVICE: ICD-10-CM

## 2022-11-16 DIAGNOSIS — I42.8 NON-ISCHEMIC CARDIOMYOPATHY (HCC): ICD-10-CM

## 2022-11-16 PROCEDURE — 1124F ACP DISCUSS-NO DSCNMKR DOCD: CPT | Performed by: INTERNAL MEDICINE

## 2022-11-16 PROCEDURE — G8484 FLU IMMUNIZE NO ADMIN: HCPCS | Performed by: INTERNAL MEDICINE

## 2022-11-16 PROCEDURE — G8427 DOCREV CUR MEDS BY ELIG CLIN: HCPCS | Performed by: INTERNAL MEDICINE

## 2022-11-16 PROCEDURE — 99215 OFFICE O/P EST HI 40 MIN: CPT | Performed by: INTERNAL MEDICINE

## 2022-11-16 PROCEDURE — 1036F TOBACCO NON-USER: CPT | Performed by: INTERNAL MEDICINE

## 2022-11-16 PROCEDURE — G8417 CALC BMI ABV UP PARAM F/U: HCPCS | Performed by: INTERNAL MEDICINE

## 2022-11-16 PROCEDURE — 93000 ELECTROCARDIOGRAM COMPLETE: CPT | Performed by: INTERNAL MEDICINE

## 2022-11-16 NOTE — PROGRESS NOTES
Cardiac Electrophysiology Consultation     Date: 11/16/2022   Reason for Consultation: Atrial fibrillation / SVT  Consult Requesting Physician: Almaz Briggs. Jesus Palencia MD  Primary Care Physician: Oscar DRAKE     Chief Complaint:   Chief Complaint   Patient presents with    Follow-up     SVT     HPI: Sandra Overton is a 81 y.o. patient with a history of cardiomyopathy (2/2022), hypertension, hyperlipidemia, kidney CA managed at [facility] and GERD who presented to ED 2/2/2022 with c/o tachycardia after blood donation. He was noted to be in SVT rate 140's. He was treated with adenosine, but continued to have episodes of Afib and SVT. He underwent EDWARD/CV on 2/4/2022 and was started on Amiodarone at 200 mg BID which was decreased to 200 mg daily on 2/18/2022. EF of 25% noted on Echo (2/2022). He was discharged on 2/7/2022 and returned to the ED on 2/9/2022 with c/o hematuria. He was found to have a right renal mass and [address] was discontinued. We spoke with his urologist Dr. Alycia Dugan and received approval to restart Eliquis. He was seen in office on 3/13/2022 for hospital follow up to establish care for his atrial fibrillation and SVT. EKG showed SR. He reported that  the he had been feeling weak after taking all his medications at the same time and report home BP readings with SBP in the 70-80's. He was instructed to space medications and continue home monitoring of BP and if SBP continues to run less than 90 mm/hg, hold meds and f/u with PCP for further adjustment to prevent falls. He reported that he was previously on Eliquis and had issues with urinary bleeding. Approval was received from his urologist to retrial on [address] and he was started on Xarelto 20 mg daily. He had a return of urinary bleeding and Xarelto was discontinued. He was referred for evaluation of DANDRE occluder device which he eventually had the Watchman procedure 6/6/2022 by Dr. Donna Kaur currently on ASA.     Repeat echo showed improvement of LVEF improved to 60% form 25% suggestive of tachycardia mediated cardiomyopathy. Interval history: Today, he presents to office for follow up for management of his atrial fibrillation and SVT. EKG today shows SR. He is compliant with his medications and tolerating them well. He denies chest pain/pressure, tightness, edema, shortness of breath, heart racing, palpitations, lightheadedness, dizziness, syncope, presyncope,  PND or orthopnea. Past Medical History:   Diagnosis Date    Arthritis     Cancer ([address]     skin-leg    Enlarged prostate     GERD (gastroesophageal reflux disease)     Gout     History of transesophageal echocardiography (EDWARD) 02/04/2022    Mild mitral and tricuspid regurgitation. Hyperlipidemia     Hypertension     Neuropathy     Wears glasses     Wears partial dentures     Wears partial dentures     upper      Past Surgical History:   Procedure Laterality Date    BACK SURGERY      COLONOSCOPY      COLONOSCOPY N/A 3/16/2021    COLONOSCOPY performed by Pawan Samuel MD at [facility] Left     ankle    TONSILLECTOMY         Allergies:  No Known Allergies    Medication:   Prior to Admission medications    Medication Sig Start Date End Date Taking? Authorizing Provider   lisinopril (PRINIVIL;ZESTRIL) 20 MG tablet Take 1 tablet by mouth daily 11/7/22  Yes Luh Armenta MD   metoprolol succinate (TOPROL XL) 50 MG extended release tablet TAKE 1/2 TABLET BY MOUTH EVERY DAY 11/3/22  Yes Luh Armenta MD   predniSONE (DELTASONE) 10 MG tablet Take 10 mg by mouth daily   Yes Historical Provider, MD   flecainide (TAMBOCOR) 100 MG tablet TAKE 1 TABLET BY MOUTH TWICE A DAY 6/3/22  Yes Jenaette Parkinson MD   aspirin 81 MG EC tablet Take 81 mg by mouth daily   Yes Historical Provider, MD   esomeprazole (NEXIUM) 40 MG delayed release capsule TAKE 1 CAPSULE (40 MG TOTAL) BY MOUTH DAILY.  2/11/22  Yes Historical Provider, MD   atorvastatin (LIPITOR) 10 MG tablet Take 10 mg by mouth daily   Yes Historical Provider, MD       Social History:   reports that he has never smoked. He has never used smokeless tobacco. He reports that he does not currently use alcohol. He reports that he does not use drugs. Family History:  family history includes Cancer in his father, mother, and sister. Reviewed. Denies family history of sudden cardiac death, arrhythmia, premature CAD    Review of System:  Pertinent positive and negatives are in the HPI, the rest are negative. Physical Examination:  /80 (Site: Left Upper Arm, Position: Sitting)   Pulse 67   Ht 6' 4\" (1.93 m)   Wt 227 lb (103 kg)   SpO2 95%   BMI 27.63 kg/m²      Constitutional: Oriented. No distress. Head: Normocephalic and atraumatic. Mouth/Throat: Oropharynx is clear and moist.   Eyes: Conjunctivae normal. EOM are normal.   Neck: Normal range of motion. Neck supple. No rigidity. No JVD present. Cardiovascular: Bradycardic rate, regular rhythm, S1&S2 and intact distal pulses. Pulmonary/Chest: Bilateral respiratory sounds. No wheezes. No rhonchi. Abdominal: Soft. Bowel sounds present. No distension, No tenderness. Musculoskeletal: No tenderness. No edema    Lymphadenopathy: Has no cervical adenopathy. Neurological: Alert and oriented. Cranial nerve appears intact, No Gross deficit   Skin: Skin is warm and dry. No rash noted. Psychiatric: Has a normal mood, affect and behavior     Labs:  Reviewed. ECG: reviewed, sinus bradycardia, first degree A-V block  with v-rate of 59 bpm with QRS duration 115 ms. No ventricular pre-excitation, or QT prolongation. Studies:   1. Event monitor:    n/a    2. Limited Echo 4/26/2022  Summary  Normal LV size,wall thickness and motion. EF ~ 60%. Normal diastolic function. Left atrium is of normal size. Normal right ventricular size and function. Mild Mitral and Tricuspid regurgitation. Echo: 2/2/2022  Suboptimal image quality. Patient appears to be in atrial fibrillation.   Overall left ventricular systolic function appears severely reduced. Ejection fraction is visually estimated to be 20-25% with diffuse hypokinesis. Normal left ventricular wall thickness and cavity size. The right ventricle appears normal in size with moderately reduced systolic function. Mild mitral and tricuspid regurgitation. EDWARD: 2/4/2022  No Left atrial or appendage clot. Mild mitral and tricuspid regurgitation. 3. Stress Test:  n/a    4. Cath: n/a    I independently reviewed the ECG, MCOT, echocardiogram, stress test, and coronary angiography/PCI results and used them for my plan of care. Procedures:  1. Successful single biphasic cardioversion to NSR on 2/4/2022. Assessment/Plan:   SVT - appears short RP tachycardia   -Noted on EKG 2/1/2022.   -Symptomatic with fatigue.   -Denies any reoccurrence of SVT, denies symptoms of palpitations and heart racing. Paroxysmal atrial fibrillation and flutter with 2:1 conduction.   -Noted on telemetry while hospitalized (2/2022)   -s/p EDWARD/CV 2/4/2022.  - Continue Flecainide 100 mg BID and Toprol XL 25 mg daily for rate and rhythm control. - msec. -LVEF up to 60 % with restoration of SR.   -He has a CHADS Vasc is at least 3 (HTN, AGE)   -s/p watchman 6/6/2022 now on ASA only. - He has not had a return of atrial fibrillation since starting Flecainide , if he were to have a return on atrial fibrillation while on AAD it would be recommended that he undergo ablation to prevent decreased in LVEF which he was noted to have when in afib in the past.    - 14 day CAM patch monitor to assess atrial fibrillation burden. Mr. Stewart Cox is doing well s/p Watchman device on aspirin. He does not think he's had atrial fibrilltion/flutter recurrence. He feels well and tolerates flecainide 100 mg bid. Will get a 14 day event monitor given that he never had palpitations. Follow up Q6 months.       Tachycardia mediated cardiomyopathy - LVEF recovered with maintenance of NSR.    -EF 20-25% by Echo 2/2022. -LVEF up to 60 % per Echo 4/26/2022 with restoration of SR.   -Continue guideline directed medical therapy.  -Beta Blocker - Toprol XL 25 mg daily.  -Aldosterone Antagonist:  Not on due to hypotension. -ACE/ARB/ARNI:  Lisinopril 10 mg daily. Essential hypertension     -Stable. -Continue current medical management. Kidney cancer/Renal Mass/Prostate enlargement   -s/p ablation      -Managed at [facility]    -Following with Urologist Dr. Gagan Mendez.   -unable to tolerate [facility] urinary bleeding noted on Eliquis and Xarelto. Currently being worked up for [employer]. Follow ups: Follow up with myself in 6 months. Continue routine follow up with Dr. Kaylene Aldridge as scheduled. Thank you for allowing me to participate in the care of Keshia Garland. All questions and concerns were addressed to the patient/family. Alternatives to my treatment were discussed. This note was scribed in the presence of Bonnielee Severance, MD by Jermaine Moon RN. Physician attestation: The scribe's documentation has been prepared under my direction and has been personally reviewed by me in its entirety. I confirm that the note above reflects all work, treatment, procedures, and medical decision making performed by me.      Bonnielee Severance, MD  Cardiac Electrophysiology  [facility]

## 2022-11-16 NOTE — TELEPHONE ENCOUNTER
Pt received a cardiac monitor in office today. Instructions were given to pt including patch placement, showering, diary instructions and returning monitor. Pt v/u. Monitor placed by Daysi Love  Length of monitor 14 days  Monitor ordered by Dr. Nichelle Leija  Serial number [id number]  Kit ID N/A  Activation successful prior to pt leaving office? Yes        Monitor placed on spreadsheet.

## 2022-11-18 ENCOUNTER — OFFICE VISIT (OUTPATIENT)
Dept: ORTHOPEDIC SURGERY | Age: 87
End: 2022-11-18
Payer: MEDICARE

## 2022-11-18 VITALS — WEIGHT: 227 LBS | RESPIRATION RATE: 16 BRPM | BODY MASS INDEX: 27.64 KG/M2 | HEIGHT: 76 IN

## 2022-11-18 DIAGNOSIS — R22.31 FINGER MASS, RIGHT: Primary | ICD-10-CM

## 2022-11-18 PROCEDURE — G8417 CALC BMI ABV UP PARAM F/U: HCPCS | Performed by: ORTHOPAEDIC SURGERY

## 2022-11-18 PROCEDURE — G8428 CUR MEDS NOT DOCUMENT: HCPCS | Performed by: ORTHOPAEDIC SURGERY

## 2022-11-18 PROCEDURE — G8484 FLU IMMUNIZE NO ADMIN: HCPCS | Performed by: ORTHOPAEDIC SURGERY

## 2022-11-18 PROCEDURE — 99203 OFFICE O/P NEW LOW 30 MIN: CPT | Performed by: ORTHOPAEDIC SURGERY

## 2022-11-18 RX ORDER — INFLUENZA A VIRUS A/MICHIGAN/45/2015 X-275 (H1N1) ANTIGEN (FORMALDEHYDE INACTIVATED), INFLUENZA A VIRUS A/SINGAPORE/INFIMH-16-0019/2016 IVR-186 (H3N2) ANTIGEN (FORMALDEHYDE INACTIVATED), INFLUENZA B VIRUS B/PHUKET/3073/2013 ANTIGEN (FORMALDEHYDE INACTIVATED), AND INFLUENZA B VIRUS B/MARYLAND/15/2016 BX-69A ANTIGEN (FORMALDEHYDE INACTIVATED) 60; 60; 60; 60 UG/.7ML; UG/.7ML; UG/.7ML; UG/.7ML
240 INJECTION, SUSPENSION INTRAMUSCULAR ONCE
COMMUNITY
Start: 2022-09-01

## 2022-11-18 RX ORDER — SILODOSIN 8 MG/1
8 CAPSULE ORAL
COMMUNITY
Start: 2022-09-25

## 2022-11-18 RX ORDER — MECLIZINE HYDROCHLORIDE 25 MG/1
TABLET ORAL
COMMUNITY
Start: 2022-11-10

## 2022-11-18 NOTE — Clinical Note
Dear  Maddy Chua,  Thank you very much for your referral or Mr. Slime Moffett to me for evaluation and treatment of his Hand & Wrist condition. I appreciate your confidence in me and thank you for allowing me the opportunity to care for your patients. If I can be of any further assistance to you on this or any other patient, please do not hesitate to contact me. Sincerely,  Karli Green.  Isaiah Lockwood MD

## 2022-11-18 NOTE — PROGRESS NOTES
Mr. Abundio Bloch is a 81 y.o. right handed man  who is seen today in Hand Surgical Consultation at the request of [address]. He presents today regarding right Middle Finger symptoms which have been present for approximately 1 years. A history of antecedent trauma or injury is Absent. He reports a mass on the Ulnar and Proximal aspect of the Middle Finger with symptoms that include No Symptoms. Finger symptoms are exacerbated with no activity exacerbates the symptoms. The size of the mass has been stable with time and change in activity level. Previous treatment has included avoidance of bothersome tasks. He does not claim relation of his symptoms to his required work activities. He has not undergone any form of testing. I have today reviewed with Abundio Bloch the clinically relevant, past medical history, medications, allergies,  family history, social history, and Review Of Systems & I have documented any details relevant to today's presenting complaints in my history above. Mr. Santos Davila self-reported past medical history, medications, allergies,  family history, social history, and Review Of Systems have been scanned into the chart under the \"Media\" tab. Physical Exam:  Mr. Santos Davila most recent vitals:  Vitals  Resp: 16  Height: 6' 4\" (193 cm)  Weight: 227 lb (103 kg)    He is well nourished, oriented to person, place & time. He demonstrates appropriate mood and affect as well as normal gait and station. Skin: Normal in appearance, Normal Color, and Free of Lesions Bilaterally   Digital range of motion is equal bilaterally . Wrist range of motion is equal bilaterally . There is no evidence of gross joint instability bilaterally. Sensation is normal in the Whole Hand bilaterally  Muscular strength is clinically appropriate bilaterally.   Vascular examination reveals good capillary refill and good color bilaterally  There is no Swelling on the Right, normal on the Left  There is a 7x10 mm Firm and Mobile mass on the  Ulnar and Proximal aspect of the Middle Finger nearest to the PIP Joint. The mass is not tender to palpation, unchanged in maximal digital flexion/extension. Impression:  Mr. Manoj Dey has developed a Ulnar and Proximal Middle Finger mass, which is currently of little  concern, and presents requesting further treatment. Plan:  I have had a thorough discussion with Mr. Manoj Dey regarding the treatment options available for his initially presenting right  Middle Finger mass, which is causing him significant symptoms and difficulty. I have outlined for Mr. Manoj Dey the risk, benefits and consequences of the various treatment modalities, including a reasonable expectation for the long term success of each. We have discussed the possibility that further, more aggressive treatment may be required for his current presenting condition. Based upon our current discussion and a reasonable understating of the options available to him, Mr. Manoj Dey has selected to proceed with a conservative plan of treatment consisting of: the use of interval monitoring of the size and character of the mass, activity modification, and the judicious use of over-the-counter anti-inflammatory medications if allowed by his primary care physician. Instructions were given as well as suggestions for use of the other modalities were discussed. I have clearly explained to him that the above outlined treatment plan should not be expected to 'cure' his  finger mass, but we are rather treating the symptoms with which he presents. He has understood that in order to achieve more durable relief of his symptoms and to prevent future worsening or further damage, that definitive surgical treatment would be required. Mr. Manoj Dey  voiced an appropriate understanding of our discussion, the options available to him, and of the expectations of his selected  treatment.     I have also discussed with Mr. Rohith Gann  the other treatment options available to him  for this condition. We have today selected to proceed with conservative management. He and I have agreed that if our current course of conservative treatment does not prove to be effective over the short term future, that he will schedule a follow-up appointment to discuss and select an alternate course of therapy including possibly injection or surgical treatment. Mr. Rohith Gann has been given a full verbal list of instructions and precautions related to his present condition. I have asked him to followup with me in the office at the prescribed time. He is also specifically requested to call or return to the office sooner if his symptoms change or worsen prior to the next scheduled appointment.

## 2022-11-19 NOTE — PATIENT INSTRUCTIONS
Thank you for choosing [facility]) Physicians for your Hand and Upper Extremity needs. If we can be of any further assistance to you, please do not hesitate to contact us.     Office Phone Number:  [phone]  or  [phone]

## 2022-12-07 ENCOUNTER — TELEPHONE (OUTPATIENT)
Dept: CARDIOLOGY CLINIC | Age: 87
End: 2022-12-07

## 2022-12-07 PROCEDURE — 93246 EXT ECG>7D<15D RECORDING: CPT | Performed by: INTERNAL MEDICINE

## 2022-12-07 NOTE — TELEPHONE ENCOUNTER
Monitor placed by [facility] [place] dx   Length of monitor 14 days   Monitor ordered by Dr. Namrata Dixon   Serial number [id number]  Kit ID   Activation successful prior to pt leaving office? Yes      Pt came in today ( on cardio schedule ) for 14 day Taigen heart monitor.

## 2022-12-27 RX ORDER — FLECAINIDE ACETATE 100 MG/1
TABLET ORAL
Qty: 180 TABLET | Refills: 3 | Status: SHIPPED | OUTPATIENT
Start: 2022-12-27

## 2023-01-06 PROCEDURE — 93248 EXT ECG>7D<15D REV&INTERPJ: CPT | Performed by: INTERNAL MEDICINE

## 2023-01-20 ENCOUNTER — TELEPHONE (OUTPATIENT)
Dept: CARDIOLOGY CLINIC | Age: 88
End: 2023-01-20

## 2023-01-20 NOTE — TELEPHONE ENCOUNTER
Alan Reece called for Mary Bah RN. He states that he misplaced his number and dwould like a call back, Alan Reece stated that it is not urgent.     Alan Reece can be reached at: [phone]

## 2023-02-13 DIAGNOSIS — I48.0 PAF (PAROXYSMAL ATRIAL FIBRILLATION) (HCC): ICD-10-CM

## 2023-02-13 DIAGNOSIS — I47.1 SVT (SUPRAVENTRICULAR TACHYCARDIA) (HCC): ICD-10-CM

## 2023-02-13 DIAGNOSIS — I48.0 PAROXYSMAL A-FIB (HCC): Primary | ICD-10-CM

## 2023-04-17 RX ORDER — METOPROLOL SUCCINATE 50 MG/1
TABLET, EXTENDED RELEASE ORAL
Qty: 45 TABLET | Refills: 3 | Status: SHIPPED | OUTPATIENT
Start: 2023-04-17

## 2023-04-17 NOTE — TELEPHONE ENCOUNTER
Last OV: 11/7/22  Next OV: 5/8/23  Last refill: 11/3/22  Most recent Labs: BMP 6/6/22  Last EKG (if needed):

## 2023-05-11 ENCOUNTER — OFFICE VISIT (OUTPATIENT)
Dept: CARDIOLOGY CLINIC | Age: 88
End: 2023-05-11

## 2023-05-11 VITALS
HEIGHT: 76 IN | WEIGHT: 220 LBS | DIASTOLIC BLOOD PRESSURE: 84 MMHG | HEART RATE: 68 BPM | OXYGEN SATURATION: 96 % | BODY MASS INDEX: 26.79 KG/M2 | SYSTOLIC BLOOD PRESSURE: 134 MMHG

## 2023-05-11 DIAGNOSIS — Z85.528 HISTORY OF KIDNEY CANCER: ICD-10-CM

## 2023-05-11 DIAGNOSIS — I42.8 NON-ISCHEMIC CARDIOMYOPATHY (HCC): ICD-10-CM

## 2023-05-11 DIAGNOSIS — I48.0 PAF (PAROXYSMAL ATRIAL FIBRILLATION) (HCC): Primary | ICD-10-CM

## 2023-05-11 DIAGNOSIS — I10 ESSENTIAL HYPERTENSION: ICD-10-CM

## 2023-05-11 NOTE — PROGRESS NOTES
[address]  Cardiology  Note      Lucina Willoughby  1935, 81 y.o.      CC: \" I feel well. \"               Tracee Allan DRAKE:      HPI:   This is a 81 y.o. male with a PMH significant for SVT, Afib, cardiomyopathy (2/2022) has resolved. Status post Watchman placement. In addition he has HTN, HLD and history of kidney cancer managed by [facility]. Noted to be in SVT rates in the 140's and treated with adenosine. He continued to have episodes of Afib and SVT. Underwent EDWARD/CV on 2/4/2022 and started on Amiodarone at 200 mg BID that was decreased to 200 mg daily on 2/18/2022. LV function 25% on Echo (2/2022). Multiple episodes of hematuria on [address] [place] [address] Road. Found to have R renal mass. Subsequently evaluated by  Dr. Mika Gonzalez for [facility] [place] [place] and now on ASA only. Comes for routine follow up. No new complaints. Feels well. No reports of abnormal bruising or bleeding. No awareness of heart racing or palpitations. Past Medical History:   Diagnosis Date    Arthritis     Cancer ([address]     skin-leg    Enlarged prostate     GERD (gastroesophageal reflux disease)     Gout     History of transesophageal echocardiography ([facility]) 02/04/2022    Mild mitral and tricuspid regurgitation.     Hyperlipidemia     Hypertension     Neuropathy     Wears glasses     Wears partial dentures     Wears partial dentures     upper      Past Surgical History:   Procedure Laterality Date    BACK SURGERY      CARDIAC SURGERY      COLONOSCOPY      COLONOSCOPY N/A 03/16/2021    COLONOSCOPY performed by Joe Lockwood MD at [facility] Left     ankle    TONSILLECTOMY        Family History   Problem Relation Age of Onset    Cancer Mother     Cancer Father     Cancer Sister       Social History     Tobacco Use    Smoking status: Never    Smokeless tobacco: Never   Vaping Use    Vaping Use: Never used   Substance Use Topics    Alcohol use: Not Currently    Drug use: Never     No Known Allergies      Review of Systems -

## 2023-05-15 PROBLEM — I47.1 SVT (SUPRAVENTRICULAR TACHYCARDIA) (HCC): Status: ACTIVE | Noted: 2023-05-15

## 2023-05-15 PROBLEM — I47.10 SVT (SUPRAVENTRICULAR TACHYCARDIA): Status: ACTIVE | Noted: 2023-05-15

## 2023-05-15 PROBLEM — I10 ESSENTIAL HYPERTENSION: Status: ACTIVE | Noted: 2023-05-15

## 2023-05-15 PROBLEM — I48.0 PAF (PAROXYSMAL ATRIAL FIBRILLATION) (HCC): Status: ACTIVE | Noted: 2023-05-15

## 2023-05-15 PROBLEM — I42.8 NON-ISCHEMIC CARDIOMYOPATHY (HCC): Status: ACTIVE | Noted: 2023-05-15

## 2023-05-15 NOTE — PROGRESS NOTES
[address]   Electrophysiology      Date: 5/16/2023    Primary Cardiologist: Tammy Dash MD  PCP: Rylee DRAKE     Chief Complaint:   Chief Complaint   Patient presents with    Follow-up     PT reports no issues at this time. History of Present Illness:    I saw Ludwin Mendez in the office for electrophysiology follow up today. He is a 81 y.o. male with a past medical history of SVT, PAF, cardiomyopathy (2/2022), hypertension, hyperlipidemia, kidney CA managed at [facility] and GERD. He underwent EDWARD/CV on 2/4/2022 and was started on Amiodarone at 200 mg BID which was decreased to 200 mg daily on 2/18/2022. EF of 25% noted on Echo (2/2022). He was discharged on 2/7/2022 and returned to the ED on 2/9/2022 with c/o hematuria. He was found to have a right renal mass and [address] was discontinued. We spoke with his urologist Dr. Zina Link and received approval to restart [address]. He was referred for evaluation of DANDRE occluder device which he eventually had the Watchman procedure 6/6/2022 by Dr. Gloria Lynch currently on ASA. Repeat echo showed improvement of LVEF improved to 60% form 25% suggestive of tachycardia mediated cardiomyopathy. He has since been maintained on flecainide 100 mg twice daily for suppression of his atrial fibrillation and SVT. A 14-day monitor in December 2022 showed sinus with brief PAT. He presents today for follow-up. He has been doing well since his last follow-up. He denies any issues with atrial fibrillation that he has been aware of. No palpitations or dyspnea. No chest pain or syncope. No edema. He stays fairly active but his activity is mostly limited by neuropathy. Allergies:  No Known Allergies  Home Medications:  Prior to Visit Medications    Medication Sig Taking?  Authorizing Provider   metoprolol succinate (TOPROL XL) 50 MG extended release tablet TAKE 1/2 TABLET BY MOUTH EVERY DAY Yes Jez Chavez MD   flecainide (TAMBOCOR) 100 MG tablet TAKE 1 TABLET BY MOUTH

## 2023-05-16 ENCOUNTER — OFFICE VISIT (OUTPATIENT)
Dept: CARDIOLOGY CLINIC | Age: 88
End: 2023-05-16
Payer: MEDICARE

## 2023-05-16 VITALS
WEIGHT: 220 LBS | HEIGHT: 76 IN | SYSTOLIC BLOOD PRESSURE: 106 MMHG | BODY MASS INDEX: 26.79 KG/M2 | OXYGEN SATURATION: 99 % | DIASTOLIC BLOOD PRESSURE: 68 MMHG | HEART RATE: 60 BPM

## 2023-05-16 DIAGNOSIS — I47.1 SVT (SUPRAVENTRICULAR TACHYCARDIA) (HCC): ICD-10-CM

## 2023-05-16 DIAGNOSIS — I10 ESSENTIAL HYPERTENSION: ICD-10-CM

## 2023-05-16 DIAGNOSIS — I48.0 PAF (PAROXYSMAL ATRIAL FIBRILLATION) (HCC): Primary | ICD-10-CM

## 2023-05-16 DIAGNOSIS — I42.8 NON-ISCHEMIC CARDIOMYOPATHY (HCC): ICD-10-CM

## 2023-05-16 PROCEDURE — 1036F TOBACCO NON-USER: CPT | Performed by: NURSE PRACTITIONER

## 2023-05-16 PROCEDURE — 99214 OFFICE O/P EST MOD 30 MIN: CPT | Performed by: NURSE PRACTITIONER

## 2023-05-16 PROCEDURE — G8427 DOCREV CUR MEDS BY ELIG CLIN: HCPCS | Performed by: NURSE PRACTITIONER

## 2023-05-16 PROCEDURE — G8417 CALC BMI ABV UP PARAM F/U: HCPCS | Performed by: NURSE PRACTITIONER

## 2023-05-16 PROCEDURE — 1124F ACP DISCUSS-NO DSCNMKR DOCD: CPT | Performed by: NURSE PRACTITIONER

## 2023-05-16 PROCEDURE — 93000 ELECTROCARDIOGRAM COMPLETE: CPT | Performed by: NURSE PRACTITIONER

## 2023-05-16 ASSESSMENT — ENCOUNTER SYMPTOMS
SORE THROAT: 0
CONSTIPATION: 0
SINUS PRESSURE: 0
BACK PAIN: 0
BLOOD IN STOOL: 0
NAUSEA: 0
TROUBLE SWALLOWING: 0
COLOR CHANGE: 0
VOMITING: 0
DIARRHEA: 0
ABDOMINAL PAIN: 0
SHORTNESS OF BREATH: 0
WHEEZING: 0
COUGH: 0

## 2023-07-07 ENCOUNTER — TELEPHONE (OUTPATIENT)
Dept: CARDIOLOGY CLINIC | Age: 88
End: 2023-07-07

## 2023-07-18 ENCOUNTER — OFFICE VISIT (OUTPATIENT)
Dept: ORTHOPEDIC SURGERY | Age: 88
End: 2023-07-18
Payer: MEDICARE

## 2023-07-18 VITALS — WEIGHT: 216 LBS | BODY MASS INDEX: 26.3 KG/M2 | HEIGHT: 76 IN

## 2023-07-18 DIAGNOSIS — G89.29 CHRONIC MIDLINE LOW BACK PAIN, UNSPECIFIED WHETHER SCIATICA PRESENT: Primary | ICD-10-CM

## 2023-07-18 DIAGNOSIS — M54.50 CHRONIC MIDLINE LOW BACK PAIN, UNSPECIFIED WHETHER SCIATICA PRESENT: Primary | ICD-10-CM

## 2023-07-18 DIAGNOSIS — M47.27 OSTEOARTHRITIS OF SPINE WITH RADICULOPATHY, LUMBOSACRAL REGION: ICD-10-CM

## 2023-07-18 PROCEDURE — G8427 DOCREV CUR MEDS BY ELIG CLIN: HCPCS | Performed by: PHYSICIAN ASSISTANT

## 2023-07-18 PROCEDURE — G8417 CALC BMI ABV UP PARAM F/U: HCPCS | Performed by: PHYSICIAN ASSISTANT

## 2023-07-18 PROCEDURE — 99214 OFFICE O/P EST MOD 30 MIN: CPT | Performed by: PHYSICIAN ASSISTANT

## 2023-07-18 PROCEDURE — 1036F TOBACCO NON-USER: CPT | Performed by: PHYSICIAN ASSISTANT

## 2023-07-18 PROCEDURE — 1124F ACP DISCUSS-NO DSCNMKR DOCD: CPT | Performed by: PHYSICIAN ASSISTANT

## 2023-07-19 NOTE — PROGRESS NOTES
History of present illness:   Mr. Sari Roth is a pleasant 81 y.o. male kindly referred by self for consultation regarding his low back pain and intermittent right leg radicular pain. He states his symptoms began approximately 6 months ago and at that time symptoms were fairly severe. Over the past 3 to 4 months symptoms have significantly improved. He states when he was having more intense pain the pain was 10/10 across his lower back, buttocks and down his right leg. Pain has steadily improved since onset. He reports numbness into his right leg. He denies weakness of his right and left leg. He denies bowel or bladder dysfunction and saddle anesthesia. He can sit for a maximum of unlimited minutes and stand for a maximum of 30 minutes. Pain does not disrupt his sleep. Prior medications and treatment tried include ER visit 12/20/2022 and 1/1/2023. At this time he was provided some injections for his pain. He states he had difficulty getting into see a provider for his back due to other issues. He does have a history of back surgery about 4 years ago but does not recall which procedure he had done. Past medical history:  His past medical history has been reviewed. Past Medical History:   Diagnosis Date    Arthritis     Cancer ([address])     skin-leg    Enlarged prostate     GERD (gastroesophageal reflux disease)     Gout     History of transesophageal echocardiography ([facility]) 02/04/2022    Mild mitral and tricuspid regurgitation. Hyperlipidemia     Hypertension     Neuropathy     Wears glasses     Wears partial dentures     Wears partial dentures     upper       His past surgical history has been reviewed.   Past Surgical History:   Procedure Laterality Date    BACK SURGERY      CARDIAC SURGERY      COLONOSCOPY      COLONOSCOPY N/A 03/16/2021    COLONOSCOPY performed by Mitch Dash MD at [address] [place] [address] Left     ankle    TONSILLECTOMY           His medications and

## 2023-08-21 ENCOUNTER — TELEPHONE (OUTPATIENT)
Dept: CARDIOLOGY CLINIC | Age: 88
End: 2023-08-21

## 2023-08-21 NOTE — TELEPHONE ENCOUNTER
Pt called in and would like to talk to Dr Anisa Ramos. He had the WATCHMAN procedure 6 months ago and has some questions for Dr Anisa Ramos.       Timbo Burroughs # [phone]

## 2023-11-15 NOTE — PROGRESS NOTES
[facility]  Cardiology  Note      Pauline Wiseman  1935, 81 y.o.      CC:               Redd Marvin:      HPI:   This is a 81 y.o. male with a PMH significant for SVT, Afib, cardiomyopathy (2/2022) has resolved. Status post Watchman placement. In addition he has HTN, HLD and history of kidney cancer managed by [facility]. Noted to be in SVT rates in the 140's and treated with adenosine. He continued to have episodes of Afib and SVT. Underwent EDWARD/CV on 2/4/2022 and started on Amiodarone at 200 mg BID that was decreased to 200 mg daily on 2/18/2022. LV function 25% on Echo (2/2022). Multiple episodes of hematuria on [address] Found to have R renal mass. Subsequently evaluated by  Dr. Jocelyne Martinez for [facility] and now on ASA only. Comes for routine follow up. No new complaints. Feels well. Past Medical History:   Diagnosis Date    Arthritis     Cancer ([address])     skin-leg    Enlarged prostate     GERD (gastroesophageal reflux disease)     Gout     History of transesophageal echocardiography ([facility]) 02/04/2022    Mild mitral and tricuspid regurgitation.     Hyperlipidemia     Hypertension     Neuropathy     Wears glasses     Wears partial dentures     Wears partial dentures     upper      Past Surgical History:   Procedure Laterality Date    BACK SURGERY      CARDIAC SURGERY      COLONOSCOPY      COLONOSCOPY N/A 03/16/2021    COLONOSCOPY performed by Wiliam Kramer MD at [facility] Left     ankle    TONSILLECTOMY        Family History   Problem Relation Age of Onset    Cancer Mother     Cancer Father     Cancer Sister       Social History     Tobacco Use    Smoking status: Never    Smokeless tobacco: Never   Vaping Use    Vaping Use: Never used   Substance Use Topics    Alcohol use: Not Currently    Drug use: Never     No Known Allergies      Review of Systems -   Constitutional: Negative for weight gain/loss; malaise, fever  Respiratory: Negative for Asthma;  cough and

## 2023-11-16 ENCOUNTER — OFFICE VISIT (OUTPATIENT)
Dept: CARDIOLOGY CLINIC | Age: 88
End: 2023-11-16

## 2023-11-16 VITALS
OXYGEN SATURATION: 90 % | BODY MASS INDEX: 26.79 KG/M2 | HEIGHT: 76 IN | DIASTOLIC BLOOD PRESSURE: 70 MMHG | WEIGHT: 220 LBS | HEART RATE: 53 BPM | SYSTOLIC BLOOD PRESSURE: 110 MMHG

## 2023-11-16 DIAGNOSIS — I48.0 PAF (PAROXYSMAL ATRIAL FIBRILLATION) (HCC): ICD-10-CM

## 2023-11-16 DIAGNOSIS — R00.0 TACHYCARDIA: Primary | ICD-10-CM

## 2023-11-16 RX ORDER — FLECAINIDE ACETATE 100 MG/1
50 TABLET ORAL 2 TIMES DAILY
Qty: 180 TABLET | Refills: 5 | Status: SHIPPED | OUTPATIENT
Start: 2023-11-16

## 2023-11-16 RX ORDER — SILODOSIN 8 MG/1
CAPSULE ORAL
COMMUNITY
Start: 2023-10-15

## 2023-11-16 RX ORDER — COLCHICINE 0.6 MG/1
TABLET ORAL
COMMUNITY
Start: 2023-10-01

## 2023-11-24 RX ORDER — LISINOPRIL 20 MG/1
20 TABLET ORAL DAILY
Qty: 90 TABLET | Refills: 5 | Status: SHIPPED | OUTPATIENT
Start: 2023-11-24

## 2023-11-24 NOTE — TELEPHONE ENCOUNTER
Last OV: 11/16/23  Next OV: 5/16/24  Last refill:11/7/2022  Most recent Labs:  11/15/23   Care everywhere   bmp, lipid,   Last EKG (if needed):   11/16/23

## 2024-03-12 ENCOUNTER — TELEPHONE (OUTPATIENT)
Dept: CARDIOLOGY CLINIC | Age: 89
End: 2024-03-12

## 2024-03-12 NOTE — TELEPHONE ENCOUNTER
[facility] called back to state they needed OP report and/or OV notes post procedure.     Faxed over related information.

## 2024-03-12 NOTE — TELEPHONE ENCOUNTER
Davidson called the office stating that he needs to have an MRI with [facility]. He gave a fax number as they are requesting information about his Watchman procedure. I called the [facility] office to inquire what information is needed. Was informed that I would receive a call back. Will update encounter.    [facility] fax: [phone]  [facility] phone: [phone]

## 2024-03-21 RX ORDER — METOPROLOL SUCCINATE 50 MG/1
TABLET, EXTENDED RELEASE ORAL
Qty: 45 TABLET | Refills: 3 | Status: SHIPPED | OUTPATIENT
Start: 2024-03-21

## 2024-05-17 NOTE — PROGRESS NOTES
[facility]  Cardiology  Note      Davidson Correa  1935, 89 y.o.      CC: Afib s/p watchman, HTN  Neil Crystal MD:      HPI:   This is a 89 y.o. male with a PMH significant for SVT, Afib, cardiomyopathy (2/2022) has resolved.  Status post Watchman placement.  In addition he has HTN, HLD and history of kidney cancer managed by [facility]. Noted to be in SVT rates in the 140's and treated with adenosine. He continued to have episodes of Afib and SVT.  The patient cardiomyopathy has resolved.  His last EF was 60%.  He is on flecainide Toprol-XL.  He has had a Watchman procedureComes for routine 6 month follow up.  He claims that he is a healthy as a horse.  Denies any palpitation dizziness chest pain shortness of breath        Past Medical History:   Diagnosis Date    Arthritis     Cancer (HCC)     skin-leg    Enlarged prostate     GERD (gastroesophageal reflux disease)     Gout     History of transesophageal echocardiography ([facility]) 02/04/2022    Mild mitral and tricuspid regurgitation.    Hyperlipidemia     Hypertension     Neuropathy     Wears glasses     Wears partial dentures     Wears partial dentures     upper      Past Surgical History:   Procedure Laterality Date    BACK SURGERY      CARDIAC SURGERY      COLONOSCOPY      COLONOSCOPY N/A 03/16/2021    COLONOSCOPY performed by Neil Landaverde MD at [facility] ENDOSCOPY    FRACTURE SURGERY Left     ankle    TONSILLECTOMY        Family History   Problem Relation Age of Onset    Cancer Mother     Cancer Father     Cancer Sister       Social History     Tobacco Use    Smoking status: Never    Smokeless tobacco: Never   Vaping Use    Vaping Use: Never used   Substance Use Topics    Alcohol use: Not Currently    Drug use: Never     No Known Allergies      Review of Systems -   Constitutional: Negative for weight gain/loss; malaise, fever  Respiratory: Negative for Asthma;  cough and hemoptysis  Cardiovascular: Negative for palpitations,dizziness

## 2024-05-20 ENCOUNTER — OFFICE VISIT (OUTPATIENT)
Dept: CARDIOLOGY CLINIC | Age: 89
End: 2024-05-20
Payer: OTHER GOVERNMENT

## 2024-05-20 VITALS
HEART RATE: 57 BPM | DIASTOLIC BLOOD PRESSURE: 64 MMHG | SYSTOLIC BLOOD PRESSURE: 110 MMHG | OXYGEN SATURATION: 96 % | BODY MASS INDEX: 26.79 KG/M2 | HEIGHT: 76 IN | WEIGHT: 220 LBS

## 2024-05-20 DIAGNOSIS — R00.0 TACHYCARDIA: Primary | ICD-10-CM

## 2024-05-20 PROCEDURE — 93000 ELECTROCARDIOGRAM COMPLETE: CPT | Performed by: INTERNAL MEDICINE

## 2024-05-20 PROCEDURE — 99214 OFFICE O/P EST MOD 30 MIN: CPT | Performed by: INTERNAL MEDICINE

## 2024-05-20 PROCEDURE — 1124F ACP DISCUSS-NO DSCNMKR DOCD: CPT | Performed by: INTERNAL MEDICINE

## 2024-05-20 RX ORDER — ALLOPURINOL 100 MG/1
TABLET ORAL
COMMUNITY

## 2024-05-20 RX ORDER — FLECAINIDE ACETATE 50 MG/1
50 TABLET ORAL 2 TIMES DAILY
Qty: 180 TABLET | Refills: 5 | Status: SHIPPED | OUTPATIENT
Start: 2024-05-20

## 2024-06-11 ENCOUNTER — HOSPITAL ENCOUNTER (OUTPATIENT)
Dept: ULTRASOUND IMAGING | Age: 89
Discharge: HOME | End: 2024-06-11
Payer: MEDICARE

## 2024-06-11 ENCOUNTER — HOSPITAL ENCOUNTER (OUTPATIENT)
Dept: WOMENS IMAGING | Age: 89
Discharge: HOME | End: 2024-06-11
Payer: MEDICARE

## 2024-06-11 DIAGNOSIS — Z12.31 SCREENING MAMMOGRAM FOR BREAST CANCER: ICD-10-CM

## 2024-06-11 DIAGNOSIS — R92.8 ABNORMAL MAMMOGRAM: ICD-10-CM

## 2024-06-11 PROCEDURE — G0279 TOMOSYNTHESIS, MAMMO: HCPCS

## 2024-06-11 PROCEDURE — 76642 ULTRASOUND BREAST LIMITED: CPT

## 2024-11-12 NOTE — PROGRESS NOTES
[facility]  Cardiology Note      Davidson Correa  1935, 89 y.o.      11/12/24       CC: Afib s/p watchman, HTN  Crystal, Neil Hardin MD:      HPI:   Mr. Fernandez is a 89-year-old gentleman with a history of cardiomyopathy that is resolved, SVT, A-fib status post Watchman.  He is on flecainide and Toprol with controlled rhythm with no recurrence     He also has a history of a spot on his kidney which was cauterized.  He has had no further recurrence    Patient is here today for a 6 month follow up.  He is doing well with no palpitations.  He is on flecainide and Toprol-XL    Mr. Fernandez is a retired .  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  No complaints      Past Medical History:   Diagnosis Date    Arthritis     Cancer (HCC)     skin-leg    Enlarged prostate     GERD (gastroesophageal reflux disease)     Gout     History of transesophageal echocardiography ([facility]) 02/04/2022    Mild mitral and tricuspid regurgitation.    Hyperlipidemia     Hypertension     Neuropathy     Wears glasses     Wears partial dentures     Wears partial dentures     upper      Past Surgical History:   Procedure Laterality Date    BACK SURGERY      CARDIAC SURGERY      COLONOSCOPY      COLONOSCOPY N/A 03/16/2021    COLONOSCOPY performed by Neil Landaverde MD at [facility] ENDOSCOPY    FRACTURE SURGERY Left     ankle    TONSILLECTOMY        Family History   Problem Relation Age of Onset    Cancer Mother     Cancer Father     Breast Cancer Sister     Cancer Sister       Social History     Tobacco Use    Smoking status: Never    Smokeless tobacco: Never   Vaping Use    Vaping status: Never Used   Substance Use Topics    Alcohol use: Not Currently    Drug use: Never     No Known Allergies      Review of Systems -   Constitutional: Negative for weight gain/loss; malaise, fever  Respiratory: Negative for Asthma;  cough and hemoptysis  Cardiovascular: Negative for palpitations,dizziness   Gastrointestinal:

## 2024-11-13 ENCOUNTER — OFFICE VISIT (OUTPATIENT)
Dept: CARDIOLOGY CLINIC | Age: 89
End: 2024-11-13
Payer: OTHER GOVERNMENT

## 2024-11-13 VITALS
OXYGEN SATURATION: 98 % | HEART RATE: 60 BPM | WEIGHT: 213 LBS | SYSTOLIC BLOOD PRESSURE: 110 MMHG | DIASTOLIC BLOOD PRESSURE: 60 MMHG | HEIGHT: 76 IN | BODY MASS INDEX: 25.94 KG/M2

## 2024-11-13 DIAGNOSIS — I48.0 PAF (PAROXYSMAL ATRIAL FIBRILLATION) (HCC): Primary | ICD-10-CM

## 2024-11-13 PROCEDURE — 1124F ACP DISCUSS-NO DSCNMKR DOCD: CPT | Performed by: INTERNAL MEDICINE

## 2024-11-13 PROCEDURE — 99214 OFFICE O/P EST MOD 30 MIN: CPT | Performed by: INTERNAL MEDICINE

## 2024-11-13 PROCEDURE — 93000 ELECTROCARDIOGRAM COMPLETE: CPT | Performed by: INTERNAL MEDICINE

## 2024-11-13 RX ORDER — ALLOPURINOL 300 MG/1
300 TABLET ORAL DAILY
COMMUNITY
Start: 2024-11-12

## 2024-12-02 NOTE — TELEPHONE ENCOUNTER
Last OV: 24 - Reyes  Next OV: 5/15/25 - Reyes  Last Labs: 22  Last EK24   Last Filled:    Disp Refills Start End    lisinopril (PRINIVIL;ZESTRIL) 20 MG tablet 90 tablet 5 2023 --    Sig - Route: TAKE 1 TABLET BY MOUTH EVERY DAY - Oral    Sent to pharmacy as: Lisinopril 20 MG Oral Tablet (PRINIVIL;ZESTRIL)    E-Prescribing Status: Receipt confirmed by pharmacy (2023  4:46 PM EST)

## 2024-12-03 RX ORDER — LISINOPRIL 20 MG/1
20 TABLET ORAL DAILY
Qty: 90 TABLET | Refills: 5 | Status: SHIPPED | OUTPATIENT
Start: 2024-12-03

## 2024-12-23 ENCOUNTER — TELEPHONE (OUTPATIENT)
Dept: CARDIOLOGY CLINIC | Age: 89
End: 2024-12-23

## 2024-12-23 NOTE — TELEPHONE ENCOUNTER
CARDIAC CLEARANCE     What type of procedure are you having?  Mild and spinal cord stimulator trial    Which physician is performing your procedure?  [facility]    When is your procedure scheduled?  1/9/25    Where are you having this procedure?  [facility]    Patient will be receiving  MAC for sedation     They are requesting cardiac clearance to perform the above stated procedure.    Please address if the patient in stable cardiac condition for this procedure            Phone Number and Contact Name for Physicians office:    Fax number to send information: [phone]

## 2024-12-27 NOTE — TELEPHONE ENCOUNTER
Per Dr. Chambers  \"The patient may proceed with the requested procedure to his back  Risk of perioperative complications are low \"    Letter composed and faxed

## 2025-02-04 RX ORDER — FLECAINIDE ACETATE 50 MG/1
50 TABLET ORAL 2 TIMES DAILY
Qty: 180 TABLET | Refills: 3 | Status: SHIPPED | OUTPATIENT
Start: 2025-02-04

## 2025-02-04 NOTE — TELEPHONE ENCOUNTER
Last OV: 11/13/24  Next OV: 5/15/25  Last refill: 5/20/24 #180 5 R/F  Most recent Labs: 1/15/25 in care everywhere  Last EKG (if needed): 11/13/24

## 2025-03-27 RX ORDER — METOPROLOL SUCCINATE 50 MG/1
25 TABLET, EXTENDED RELEASE ORAL DAILY
Qty: 45 TABLET | Refills: 3 | Status: SHIPPED | OUTPATIENT
Start: 2025-03-27

## 2025-03-27 NOTE — TELEPHONE ENCOUNTER
Last OV: 11/13/24  Next OV: 5/15/25  Last refill: 3/21/24 #45 3 R/F  Most recent Labs: 1/15/25  Last EKG (if needed): 11/13/24

## 2025-05-07 ENCOUNTER — TELEPHONE (OUTPATIENT)
Dept: CARDIOLOGY CLINIC | Age: 89
End: 2025-05-07

## 2025-05-07 NOTE — TELEPHONE ENCOUNTER
Received an incoming fax from Oksana Fontaine an Advocate for Daniel Correa. Consent to treat. Authorization to request Health records, and a Authorization Letter. Scanning into chart.

## 2025-05-13 NOTE — PROGRESS NOTES
[facility]   Cardiology Note      Davidson Correa   1935 89 y.o.      05/15/25       CC: \"I feel fine\"  Neil Crystal MD       Problem List:   Paroxsymal atrial fibrillation  Cardiomyopathy  Hypertension  Hyperlipidemia      HPI: Patient is a 89 y.o. male with a history of hyperlipidemia, hypertension, cardiomyopathy that is resolved, SVT, A-fib status post Watchman. He is on flecainide and Toprol with controlled rhythm with no recurrence. 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     Patient is here today for a 6 month follow-up visit. He just turned 89 years old and he states that overall he is feeling fine. He is compliant with his medications and tolerating them well. He denies chest pain/pressure, tightness, edema, shortness of breath, heart racing, palpitations, lightheadedness, dizziness, syncope, presyncope,  PND or orthopnea.       Past Medical History:   Diagnosis Date    Arthritis     Cancer (HCC)     skin-leg    Enlarged prostate     GERD (gastroesophageal reflux disease)     Gout     History of transesophageal echocardiography ([facility]) 02/04/2022    Mild mitral and tricuspid regurgitation.    Hyperlipidemia     Hypertension     Neuropathy     Wears glasses     Wears partial dentures     Wears partial dentures     upper     Past Surgical History:   Procedure Laterality Date    BACK SURGERY      CARDIAC SURGERY      COLONOSCOPY      COLONOSCOPY N/A 03/16/2021    COLONOSCOPY performed by Neil Landaverde MD at [facility] ENDOSCOPY    FRACTURE SURGERY Left     ankle    TONSILLECTOMY       Family History   Problem Relation Age of Onset    Cancer Mother     Cancer Father     Breast Cancer Sister     Cancer Sister      Social History     Socioeconomic History    Marital status: Single   Tobacco Use    Smoking status: Never    Smokeless tobacco: Never   Vaping Use    Vaping status: Never Used   Substance and Sexual Activity    Alcohol use: Not Currently    Drug use: Never

## 2025-05-15 ENCOUNTER — OFFICE VISIT (OUTPATIENT)
Dept: CARDIOLOGY CLINIC | Age: 89
End: 2025-05-15
Payer: MEDICARE

## 2025-05-15 VITALS
OXYGEN SATURATION: 98 % | BODY MASS INDEX: 26.45 KG/M2 | SYSTOLIC BLOOD PRESSURE: 120 MMHG | WEIGHT: 217.2 LBS | DIASTOLIC BLOOD PRESSURE: 82 MMHG | HEART RATE: 54 BPM

## 2025-05-15 DIAGNOSIS — I48.0 PAF (PAROXYSMAL ATRIAL FIBRILLATION) (HCC): Primary | ICD-10-CM

## 2025-05-15 DIAGNOSIS — I42.8 NON-ISCHEMIC CARDIOMYOPATHY (HCC): ICD-10-CM

## 2025-05-15 DIAGNOSIS — E78.2 MIXED HYPERLIPIDEMIA: ICD-10-CM

## 2025-05-15 DIAGNOSIS — I10 ESSENTIAL HYPERTENSION: ICD-10-CM

## 2025-05-15 PROCEDURE — G8419 CALC BMI OUT NRM PARAM NOF/U: HCPCS | Performed by: INTERNAL MEDICINE

## 2025-05-15 PROCEDURE — G2211 COMPLEX E/M VISIT ADD ON: HCPCS | Performed by: INTERNAL MEDICINE

## 2025-05-15 PROCEDURE — 93000 ELECTROCARDIOGRAM COMPLETE: CPT | Performed by: INTERNAL MEDICINE

## 2025-05-15 PROCEDURE — 99214 OFFICE O/P EST MOD 30 MIN: CPT | Performed by: INTERNAL MEDICINE

## 2025-05-15 PROCEDURE — 1159F MED LIST DOCD IN RCRD: CPT | Performed by: INTERNAL MEDICINE

## 2025-05-15 PROCEDURE — 1036F TOBACCO NON-USER: CPT | Performed by: INTERNAL MEDICINE

## 2025-05-15 PROCEDURE — 1124F ACP DISCUSS-NO DSCNMKR DOCD: CPT | Performed by: INTERNAL MEDICINE

## 2025-05-15 PROCEDURE — G8427 DOCREV CUR MEDS BY ELIG CLIN: HCPCS | Performed by: INTERNAL MEDICINE

## (undated) DEVICE — ENDOSCOPY KIT: Brand: MEDLINE INDUSTRIES, INC.